# Patient Record
Sex: MALE | Race: WHITE | NOT HISPANIC OR LATINO | Employment: OTHER | URBAN - METROPOLITAN AREA
[De-identification: names, ages, dates, MRNs, and addresses within clinical notes are randomized per-mention and may not be internally consistent; named-entity substitution may affect disease eponyms.]

---

## 2017-01-09 ENCOUNTER — OFFICE VISIT (OUTPATIENT)
Dept: CARDIOLOGY | Facility: CLINIC | Age: 42
End: 2017-01-09

## 2017-01-09 VITALS
SYSTOLIC BLOOD PRESSURE: 110 MMHG | BODY MASS INDEX: 30.11 KG/M2 | DIASTOLIC BLOOD PRESSURE: 82 MMHG | WEIGHT: 227.2 LBS | HEART RATE: 97 BPM | HEIGHT: 73 IN

## 2017-01-09 DIAGNOSIS — I10 ESSENTIAL HYPERTENSION: ICD-10-CM

## 2017-01-09 DIAGNOSIS — I48.0 PAROXYSMAL ATRIAL FIBRILLATION (HCC): Primary | ICD-10-CM

## 2017-01-09 PROCEDURE — 99213 OFFICE O/P EST LOW 20 MIN: CPT | Performed by: INTERNAL MEDICINE

## 2017-01-09 RX ORDER — GABAPENTIN 600 MG/1
600 TABLET ORAL NIGHTLY
COMMUNITY
End: 2017-08-31

## 2017-01-09 RX ORDER — METOPROLOL TARTRATE 50 MG/1
50 TABLET, FILM COATED ORAL 2 TIMES DAILY
Qty: 60 TABLET | Refills: 11 | Status: SHIPPED | OUTPATIENT
Start: 2017-01-09 | End: 2017-07-31 | Stop reason: SDUPTHER

## 2017-01-09 RX ORDER — HYDROCODONE BITARTRATE AND ACETAMINOPHEN 7.5; 325 MG/1; MG/1
1 TABLET ORAL EVERY 6 HOURS PRN
COMMUNITY
End: 2017-08-31

## 2017-01-09 NOTE — PROGRESS NOTES
Subjective:   Mikie Meléndez  1975  [phone]      01/09/2017    [facility] CARDIOLOGY    Reanna Aguilera MD  [address] [place] [id number]    REFERRING DOCTOR: Jamal Ventura MD       Patient ID: Mikie Meléndez is a 41 y.o. male.    Chief Complaint:   Chief Complaint   Patient presents with   • Atrial Fibrillation     Problem List:    1. Paroxysmal Atrial Fibrillation   a. History of previous stress testing and Holter monitoring, data insufficient.   b. Sinus rhythm by EKG.   c. Holter monitor showing sinus rhythm. Rare PVCs. One short run of atrial tachycardia. No AV block or pauses.  d. Echocardiogram with a preliminary report, 12/29/2014. EF 55% to 60%. Left atrium at 3.7 cm.  e. Dec 31 st diagnosis of Afib with RVR. ECV at  on jan 1st with initiation of Xarelto; stopped and changed to Eliquis   f. Echocardiogram 5/19/2016: EF 40-45%. Mild MR. Mild physiologic TR present. ? Tachy induced CMP  g. Cardiac catheterization 6/2/2016: EF 65%. Normal left ventricular function. Geographically normal coronary arteries.  h. Echocardiogram 6/24/2016: EF 50%. No thrombus or mass detected in left atrium or left atrial appendage. Mild left atrial enlargement. Trace intermittent MR.  i. Event Monitor 5/24/2016-6/23/2016 showing runs of PVCs and PACs (half time PAC and other PVCs). No Afib. At times symptoms of palpitations, SOB correlated with ectopy and at other times NSR with no abnormalities.   j. Cardioversion in January and June 2016. Started on Flec 50 mg BID, Toprol and Eliquis  k. S/p PVA 9/28/16, in SR today. Last episode that he has noticed was 6 weeks ago  2. Dyslipidemia.   3. Excessive caffeine intake.   4. Family history of tachycardia.   5. Surgical history:   a. Lithotripsy.   b. Appendectomy.   c. Deviated septum repair.   d. Herniorrhaphy.   e. Back surgery.   f. Recent surgery of Lasik surgery.     No Known Allergies    Current Outpatient Prescriptions:   •   "amLODIPine (NORVASC) 5 MG tablet, Take 1 tablet by mouth Daily., Disp: 30 tablet, Rfl: 2  •  apixaban (ELIQUIS) 5 MG tablet tablet, Take 5 mg by mouth every 12 (twelve) hours., Disp: , Rfl:   •  flecainide (TAMBOCOR) 50 MG tablet, Take 1 tablet by mouth 2 (Two) Times a Day., Disp: 60 tablet, Rfl: 11  •  gabapentin (NEURONTIN) 600 MG tablet, Take 600 mg by mouth Every Night., Disp: , Rfl:   •  HYDROcodone-acetaminophen (NORCO) 7.5-325 MG per tablet, Take 1 tablet by mouth Every 6 (Six) Hours As Needed for moderate pain (4-6)., Disp: , Rfl:   •  lisinopril (PRINIVIL,ZESTRIL) 40 MG tablet, Take 1 tablet by mouth Daily., Disp: 30 tablet, Rfl: 3  •  metoprolol succinate XL (TOPROL-XL) 50 MG 24 hr tablet, Take 1 tablet by mouth daily., Disp: 30 tablet, Rfl: 11    History of Present Illness    Patient presents today for further follow-up and management of his PAF s/p PVA 9/28/16. Patient has been doing fairly well since PVA. He has had 2 known episodes of afib with the last one being 6 weeks ago. Rates have been staying in the 90s consistently.  She states EKG does get some shortness of breath or going up steps however infrequent and feeling good.    No issues with chest pain, shortness of breath, fevers, chills, night sweats, PND, orthopnea or palpitations. No recent ER visits or hospital stays.      The following portions of the patient's history were reviewed and updated as appropriate: allergies, current medications, past family history, past medical history, past social history, past surgical history and problem list.    ROS   14 point ROS negative except as outlined in problem list, HPI and other parts of the note.    Procedures       Objective:       Vitals:    01/09/17 1316   BP: 110/82   BP Location: Left arm   Patient Position: Sitting   Pulse: 97   Weight: 227 lb 3.2 oz (103 kg)   Height: 73\" (185.4 cm)       GENERAL: Well-developed, well-nourished patient in no acute distress.  HEENT: Normocephalic, atraumatic, " PERRLA. Moist mucous membranes.  NECK: No JVD present at 30°. No carotid bruits auscultated.  LUNGS: Clear to auscultation.  CARDIOVASCULAR: Heart has a regular rate and rhythm. No murmurs, gallops or rubs noted.   ABDOMEN: Soft, nontender. Positive bowel sounds.  MUSCULOSKELETAL: No gross deformities. No clubbing, cyanosis, or lower extremity edema.  SKIN: Pink, warm  Neuro: Nonfocal exam. Gait intact  Ext: No edema or bruising    The patient's old records including ambulatory rhythm recordings (ECGs, Holter/event monitor) were reviewed and discussed.      Lab Review:           Diagnosis:   1. Paroxysmal atrial fibrillation  2. Essential hypertension      Assessment & Plan:     1. PAF s/p PVA on 9/28/16. Patient has had 2 episodes of afib but none in the last 6 weeks. Will continue Flecainide today and can consider stopping at next visit if no further issues. Rates in 90s consistently- will increase Toprol to BID. Continue Flecainide, Eliquis, and Toprol today.  We'll check an echocardiogram at the time of the next follow-up.  If no recurrence of any atrial arrhythmias at that time then consideration for stopping antiarrhythmic therapy and anticoagulation at that time.    2. HTN, controlled. Continue current meds will drop the Norvasc down to 2.5 mg daily.     Scribed for Kai Mitchell DO by Elisa Mo PA-C. 1/9/2017  1:46 PM      ROBERT High  01/09/17  1:27 PM     IKai DO, personally performed the services described in this documentation as scribed by the above named individual in my presence, and it is both accurate and complete.  1/9/2017  1:56 PM    Kai Mitchell DO  1:56 PM  01/09/17      EMR Dragon/Transcription disclaimer:  Much of this encounter note is an electronic transcription/translation of spoken language to printed text. Electronic translation of spoken language may permit erroneous, or at times, nonsensical words or phrases to be inadvertently transcribed. Although I have  reviewed the note for such errors, some may still exist.

## 2017-01-27 ENCOUNTER — TELEPHONE (OUTPATIENT)
Dept: CARDIOLOGY | Facility: CLINIC | Age: 42
End: 2017-01-27

## 2017-01-27 DIAGNOSIS — R00.2 PALPITATIONS: ICD-10-CM

## 2017-01-27 DIAGNOSIS — I48.0 PAF (PAROXYSMAL ATRIAL FIBRILLATION) (HCC): Primary | ICD-10-CM

## 2017-01-27 NOTE — TELEPHONE ENCOUNTER
"Mrs. Meléndez called reporting for the past 2 nights Mikie has had an \"irregular heart beat\" that has made it difficult to sleep.  He was not sure if it was AFib, but it was very bothersome.  He saw you on 1/9/17 & metoprolol was increased for HRs in the 90s.  She said there was discussion of a 30day monitor, but he declined at the time. I didn't see a mention of a monitor in your note.  Would you like me to have one mailed to him?  He is agreeable to one at this point.  "

## 2017-01-30 RX ORDER — AMLODIPINE BESYLATE 5 MG/1
TABLET ORAL
Qty: 30 TABLET | Refills: 0 | Status: SHIPPED | OUTPATIENT
Start: 2017-01-30 | End: 2017-04-02 | Stop reason: SDUPTHER

## 2017-01-30 NOTE — TELEPHONE ENCOUNTER
Last seen 10/28/17 and will follow up with Cardiology on 2/27/17. Sent a 30-day refill for amlodipine 5mg daily with instructions to follow up with refills at that appointment.     Keegan JulesD

## 2017-02-11 PROCEDURE — 93270 REMOTE 30 DAY ECG REV/REPORT: CPT | Performed by: INTERNAL MEDICINE

## 2017-02-24 DIAGNOSIS — I10 ESSENTIAL HYPERTENSION: ICD-10-CM

## 2017-02-24 RX ORDER — LISINOPRIL 40 MG/1
TABLET ORAL
Qty: 30 TABLET | Refills: 0 | Status: SHIPPED | OUTPATIENT
Start: 2017-02-24 | End: 2017-02-27 | Stop reason: SDUPTHER

## 2017-02-24 RX ORDER — FLECAINIDE ACETATE 50 MG/1
TABLET ORAL
Qty: 60 TABLET | Refills: 6 | Status: SHIPPED | OUTPATIENT
Start: 2017-02-24 | End: 2017-05-01

## 2017-02-24 NOTE — TELEPHONE ENCOUNTER
Last seen 10/28/17 and will follow up with Cardiology on 2/27/17. Sent a 30-day refill for lisinopril 40mg daily with instructions to follow up with refills at that appointment.      Neeta Fallon, KeeganD

## 2017-02-26 DIAGNOSIS — I10 ESSENTIAL HYPERTENSION: ICD-10-CM

## 2017-02-27 DIAGNOSIS — I10 ESSENTIAL HYPERTENSION: ICD-10-CM

## 2017-02-27 RX ORDER — FLECAINIDE ACETATE 50 MG/1
TABLET ORAL
Qty: 60 TABLET | Refills: 6 | Status: SHIPPED | OUTPATIENT
Start: 2017-02-27 | End: 2017-05-01

## 2017-02-27 RX ORDER — LISINOPRIL 40 MG/1
TABLET ORAL
Qty: 30 TABLET | Refills: 0 | OUTPATIENT
Start: 2017-02-27

## 2017-02-27 RX ORDER — LISINOPRIL 40 MG/1
40 TABLET ORAL DAILY
Qty: 30 TABLET | Refills: 5 | Status: SHIPPED | OUTPATIENT
Start: 2017-02-27 | End: 2017-05-01 | Stop reason: SINTOL

## 2017-02-27 RX ORDER — LISINOPRIL 40 MG/1
TABLET ORAL
Qty: 30 TABLET | Refills: 3 | OUTPATIENT
Start: 2017-02-27

## 2017-02-27 NOTE — TELEPHONE ENCOUNTER
Patient last seen in the clinic on 10/28/16 no follow up appointment. Refills requested for lisinopril a 30 days supply was sent last month and patient was to follow up with cardiologist for further refills. Lisinopril prescription denied and patient instructed to follow up with cardiologist.    Stephani Tom, PharmD  Pharmacy Resident  2/27/2017  1:04 PM

## 2017-03-08 RX ORDER — AMLODIPINE BESYLATE 5 MG/1
TABLET ORAL
Qty: 30 TABLET | Refills: 0 | OUTPATIENT
Start: 2017-03-08

## 2017-03-08 NOTE — TELEPHONE ENCOUNTER
Last seen 10/28/17 and saw Cardiology on 2/27/17. On 1/28 sent a 30-day refill for amlodipine 5mg daily with instructions to follow up with Dr. Mitchell for further refills. Pharmacy instructed to follow up with Dr. Mitchell.     Neeta Fallon, KeeganD

## 2017-03-14 ENCOUNTER — OFFICE VISIT (OUTPATIENT)
Dept: CARDIOLOGY | Facility: CLINIC | Age: 42
End: 2017-03-14

## 2017-03-14 DIAGNOSIS — I10 ESSENTIAL HYPERTENSION: ICD-10-CM

## 2017-03-14 DIAGNOSIS — R00.2 PALPITATIONS: ICD-10-CM

## 2017-03-14 PROCEDURE — 93272 ECG/REVIEW INTERPRET ONLY: CPT | Performed by: INTERNAL MEDICINE

## 2017-03-14 RX ORDER — LISINOPRIL 40 MG/1
TABLET ORAL
Qty: 30 TABLET | Refills: 3 | OUTPATIENT
Start: 2017-03-14

## 2017-03-14 RX ORDER — AMLODIPINE BESYLATE 5 MG/1
TABLET ORAL
Qty: 30 TABLET | Refills: 2 | OUTPATIENT
Start: 2017-03-14

## 2017-03-14 NOTE — TELEPHONE ENCOUNTER
Refills requested for lisinopril and amlodipine.  Lisinopril Rx recently sent by cardiology on 2/27 with 5 refills.  Will need further refills on amlodipine from cardiology as well.    Thanks,  Josiah Marie, PharmD  Pharmacy Resident

## 2017-04-03 RX ORDER — AMLODIPINE BESYLATE 2.5 MG/1
2.5 TABLET ORAL DAILY
Qty: 30 TABLET | Refills: 5 | Status: SHIPPED | OUTPATIENT
Start: 2017-04-03 | End: 2018-05-14 | Stop reason: DRUGHIGH

## 2017-05-01 ENCOUNTER — HOSPITAL ENCOUNTER (OUTPATIENT)
Dept: CARDIOLOGY | Facility: HOSPITAL | Age: 42
Discharge: HOME | End: 2017-05-01
Attending: INTERNAL MEDICINE | Admitting: INTERNAL MEDICINE

## 2017-05-01 ENCOUNTER — OFFICE VISIT (OUTPATIENT)
Dept: CARDIOLOGY | Facility: CLINIC | Age: 42
End: 2017-05-01

## 2017-05-01 VITALS
HEIGHT: 73 IN | WEIGHT: 226 LBS | DIASTOLIC BLOOD PRESSURE: 99 MMHG | HEART RATE: 72 BPM | SYSTOLIC BLOOD PRESSURE: 137 MMHG | BODY MASS INDEX: 29.95 KG/M2

## 2017-05-01 DIAGNOSIS — R00.2 PALPITATIONS: ICD-10-CM

## 2017-05-01 DIAGNOSIS — I48.0 PAF (PAROXYSMAL ATRIAL FIBRILLATION) (HCC): ICD-10-CM

## 2017-05-01 DIAGNOSIS — I48.0 PAROXYSMAL ATRIAL FIBRILLATION (HCC): Primary | ICD-10-CM

## 2017-05-01 DIAGNOSIS — I49.3 PVC'S (PREMATURE VENTRICULAR CONTRACTIONS): ICD-10-CM

## 2017-05-01 DIAGNOSIS — I10 ESSENTIAL HYPERTENSION: ICD-10-CM

## 2017-05-01 LAB
BH CV ECHO MEAS - AO ROOT AREA (BSA CORRECTED): 1
BH CV ECHO MEAS - AO ROOT AREA: 4.2 CM^2
BH CV ECHO MEAS - AO ROOT DIAM: 2.3 CM
BH CV ECHO MEAS - BSA(HAYCOCK): 2.3 M^2
BH CV ECHO MEAS - BSA: 2.3 M^2
BH CV ECHO MEAS - BZI_BMI: 29.9 KILOGRAMS/M^2
BH CV ECHO MEAS - BZI_METRIC_HEIGHT: 185.4 CM
BH CV ECHO MEAS - BZI_METRIC_WEIGHT: 103 KG
BH CV ECHO MEAS - CONTRAST EF (2CH): 44.1 ML/M^2
BH CV ECHO MEAS - CONTRAST EF 4CH: 57.2 ML/M^2
BH CV ECHO MEAS - EDV(CUBED): 129.6 ML
BH CV ECHO MEAS - EDV(MOD-SP2): 143 ML
BH CV ECHO MEAS - EDV(MOD-SP4): 138 ML
BH CV ECHO MEAS - EDV(TEICH): 121.6 ML
BH CV ECHO MEAS - EF(CUBED): 65.8 %
BH CV ECHO MEAS - EF(MOD-SP2): 44.1 %
BH CV ECHO MEAS - EF(MOD-SP4): 57.2 %
BH CV ECHO MEAS - EF(TEICH): 57 %
BH CV ECHO MEAS - ESV(CUBED): 44.4 ML
BH CV ECHO MEAS - ESV(MOD-SP2): 80 ML
BH CV ECHO MEAS - ESV(MOD-SP4): 59 ML
BH CV ECHO MEAS - ESV(TEICH): 52.3 ML
BH CV ECHO MEAS - FS: 30 %
BH CV ECHO MEAS - IVS/LVPW: 0.98
BH CV ECHO MEAS - IVSD: 1.2 CM
BH CV ECHO MEAS - LA DIMENSION: 3.7 CM
BH CV ECHO MEAS - LA/AO: 1.6
BH CV ECHO MEAS - LAT PEAK E' VEL: 7.1 CM/SEC
BH CV ECHO MEAS - LV DIASTOLIC VOL/BSA (35-75): 60.8 ML/M^2
BH CV ECHO MEAS - LV MASS(C)D: 241 GRAMS
BH CV ECHO MEAS - LV MASS(C)DI: 106.1 GRAMS/M^2
BH CV ECHO MEAS - LV SYSTOLIC VOL/BSA (12-30): 26 ML/M^2
BH CV ECHO MEAS - LVIDD: 5.1 CM
BH CV ECHO MEAS - LVIDS: 3.5 CM
BH CV ECHO MEAS - LVLD AP2: 9 CM
BH CV ECHO MEAS - LVLD AP4: 7.6 CM
BH CV ECHO MEAS - LVLS AP2: 7.6 CM
BH CV ECHO MEAS - LVLS AP4: 6.1 CM
BH CV ECHO MEAS - LVOT AREA (M): 3.8 CM^2
BH CV ECHO MEAS - LVOT AREA: 3.8 CM^2
BH CV ECHO MEAS - LVOT DIAM: 2.2 CM
BH CV ECHO MEAS - LVPWD: 1.2 CM
BH CV ECHO MEAS - MED PEAK E' VEL: 11.6 CM/SEC
BH CV ECHO MEAS - MV A MAX VEL: 79.5 CM/SEC
BH CV ECHO MEAS - MV DEC SLOPE: 439.5 CM/SEC^2
BH CV ECHO MEAS - MV DEC TIME: 0.18 SEC
BH CV ECHO MEAS - MV E MAX VEL: 82.9 CM/SEC
BH CV ECHO MEAS - MV E/A: 1
BH CV ECHO MEAS - MV P1/2T MAX VEL: 91.2 CM/SEC
BH CV ECHO MEAS - MV P1/2T: 60.8 MSEC
BH CV ECHO MEAS - MVA P1/2T LCG: 2.4 CM^2
BH CV ECHO MEAS - MVA(P1/2T): 3.6 CM^2
BH CV ECHO MEAS - PA ACC SLOPE: 495 CM/SEC^2
BH CV ECHO MEAS - PA ACC TIME: 0.12 SEC
BH CV ECHO MEAS - PA PR(ACCEL): 25 MMHG
BH CV ECHO MEAS - RV MAX PG: 0.64 MMHG
BH CV ECHO MEAS - RV V1 MAX: 40 CM/SEC
BH CV ECHO MEAS - SI(CUBED): 37.5 ML/M^2
BH CV ECHO MEAS - SI(MOD-SP2): 27.7 ML/M^2
BH CV ECHO MEAS - SI(MOD-SP4): 34.8 ML/M^2
BH CV ECHO MEAS - SI(TEICH): 30.5 ML/M^2
BH CV ECHO MEAS - SV(CUBED): 85.2 ML
BH CV ECHO MEAS - SV(MOD-SP2): 63 ML
BH CV ECHO MEAS - SV(MOD-SP4): 79 ML
BH CV ECHO MEAS - SV(TEICH): 69.3 ML
BH CV ECHO MEAS - TAPSE (>1.6): 2.3 CM2
BH CV XLRA - RV BASE: 3.4 CM
BH CV XLRA - RV LENGTH: 6.5 CM
BH CV XLRA - RV MID: 3.2 CM
BH CV XLRA - TDI S': 12.3 CM/SEC
E/E' RATIO: 4.4
LV EF 2D ECHO EST: 60 %

## 2017-05-01 PROCEDURE — 25010000002 SULFUR HEXAFLUORIDE MICROSPH 60.7-25 MG RECONSTITUTED SUSPENSION

## 2017-05-01 PROCEDURE — 25010000002 SULFUR HEXAFLUORIDE MICROSPH 60.7-25 MG RECONSTITUTED SUSPENSION: Performed by: INTERNAL MEDICINE

## 2017-05-01 PROCEDURE — 93306 TTE W/DOPPLER COMPLETE: CPT | Performed by: INTERNAL MEDICINE

## 2017-05-01 PROCEDURE — 99213 OFFICE O/P EST LOW 20 MIN: CPT | Performed by: PHYSICIAN ASSISTANT

## 2017-05-01 PROCEDURE — C8929 TTE W OR WO FOL WCON,DOPPLER: HCPCS

## 2017-05-01 RX ORDER — LOSARTAN POTASSIUM 50 MG/1
50 TABLET ORAL DAILY
Qty: 30 TABLET | Refills: 5 | Status: SHIPPED | OUTPATIENT
Start: 2017-05-01 | End: 2017-10-19 | Stop reason: SDUPTHER

## 2017-05-01 RX ADMIN — SULFUR HEXAFLUORIDE 3 ML: KIT at 10:44

## 2017-07-31 RX ORDER — METOPROLOL TARTRATE 50 MG/1
50 TABLET, FILM COATED ORAL 2 TIMES DAILY
Qty: 60 TABLET | Refills: 6 | Status: SHIPPED | OUTPATIENT
Start: 2017-07-31 | End: 2018-05-25 | Stop reason: SDUPTHER

## 2017-08-31 ENCOUNTER — OFFICE VISIT (OUTPATIENT)
Dept: CARDIOLOGY | Facility: CLINIC | Age: 42
End: 2017-08-31

## 2017-08-31 VITALS
DIASTOLIC BLOOD PRESSURE: 84 MMHG | HEART RATE: 86 BPM | SYSTOLIC BLOOD PRESSURE: 130 MMHG | WEIGHT: 224.6 LBS | BODY MASS INDEX: 29.77 KG/M2 | HEIGHT: 73 IN

## 2017-08-31 DIAGNOSIS — I48.0 PAROXYSMAL ATRIAL FIBRILLATION (HCC): Primary | ICD-10-CM

## 2017-08-31 DIAGNOSIS — I49.3 PVC'S (PREMATURE VENTRICULAR CONTRACTIONS): ICD-10-CM

## 2017-08-31 DIAGNOSIS — I42.9 CARDIOMYOPATHY (HCC): ICD-10-CM

## 2017-08-31 PROCEDURE — 93000 ELECTROCARDIOGRAM COMPLETE: CPT | Performed by: INTERNAL MEDICINE

## 2017-08-31 PROCEDURE — 99213 OFFICE O/P EST LOW 20 MIN: CPT | Performed by: INTERNAL MEDICINE

## 2017-08-31 RX ORDER — CIPROFLOXACIN 500 MG/1
TABLET, FILM COATED ORAL EVERY 12 HOURS
Refills: 0 | COMMUNITY
Start: 2017-08-28 | End: 2018-05-14

## 2017-08-31 RX ORDER — TAMSULOSIN HYDROCHLORIDE 0.4 MG/1
CAPSULE ORAL DAILY
Refills: 3 | COMMUNITY
Start: 2017-08-28 | End: 2018-11-12 | Stop reason: ALTCHOICE

## 2017-08-31 RX ORDER — METHYLPREDNISOLONE 4 MG/1
TABLET ORAL
Refills: 0 | COMMUNITY
Start: 2017-08-29 | End: 2018-05-14

## 2017-09-05 ENCOUNTER — TELEPHONE (OUTPATIENT)
Dept: CARDIOLOGY | Facility: CLINIC | Age: 42
End: 2017-09-05

## 2017-09-05 DIAGNOSIS — I48.0 PAROXYSMAL ATRIAL FIBRILLATION (HCC): ICD-10-CM

## 2017-09-05 DIAGNOSIS — I48.19 PERSISTENT ATRIAL FIBRILLATION (HCC): Primary | ICD-10-CM

## 2017-09-05 NOTE — TELEPHONE ENCOUNTER
Wife calling to report that Mikie feels horrible off Flecainide and has been very weak and tires with dizziness since stopping the flecainide. Instructed wife to take  to [facility] for EKG to confirm AFIB episode. Order faxed to [phone]    EKG on your desk.

## 2017-09-06 ENCOUNTER — HOSPITAL ENCOUNTER (EMERGENCY)
Facility: HOSPITAL | Age: 42
Discharge: HOME | End: 2017-09-06
Attending: EMERGENCY MEDICINE | Admitting: EMERGENCY MEDICINE

## 2017-09-06 ENCOUNTER — APPOINTMENT (OUTPATIENT)
Dept: CT IMAGING | Facility: HOSPITAL | Age: 42
End: 2017-09-06

## 2017-09-06 ENCOUNTER — APPOINTMENT (OUTPATIENT)
Dept: ULTRASOUND IMAGING | Facility: HOSPITAL | Age: 42
End: 2017-09-06

## 2017-09-06 VITALS
OXYGEN SATURATION: 99 % | DIASTOLIC BLOOD PRESSURE: 106 MMHG | HEART RATE: 74 BPM | TEMPERATURE: 98.4 F | RESPIRATION RATE: 18 BRPM | HEIGHT: 73 IN | SYSTOLIC BLOOD PRESSURE: 166 MMHG | WEIGHT: 225 LBS | BODY MASS INDEX: 29.82 KG/M2

## 2017-09-06 DIAGNOSIS — K76.0 FATTY LIVER: ICD-10-CM

## 2017-09-06 DIAGNOSIS — R17 ELEVATED BILIRUBIN: ICD-10-CM

## 2017-09-06 DIAGNOSIS — R11.0 NAUSEA: ICD-10-CM

## 2017-09-06 DIAGNOSIS — I10 ESSENTIAL HYPERTENSION: ICD-10-CM

## 2017-09-06 DIAGNOSIS — N41.9 PROSTATITIS, UNSPECIFIED PROSTATITIS TYPE: ICD-10-CM

## 2017-09-06 DIAGNOSIS — R10.9 ABDOMINAL PAIN, UNSPECIFIED LOCATION: Primary | ICD-10-CM

## 2017-09-06 LAB
ALBUMIN SERPL-MCNC: 4.2 G/DL (ref 3.2–4.8)
ALBUMIN/GLOB SERPL: 1.4 G/DL (ref 1.5–2.5)
ALP SERPL-CCNC: 67 U/L (ref 25–100)
ALT SERPL W P-5'-P-CCNC: 35 U/L (ref 7–40)
ANION GAP SERPL CALCULATED.3IONS-SCNC: 6 MMOL/L (ref 3–11)
AST SERPL-CCNC: 19 U/L (ref 0–33)
BASOPHILS # BLD AUTO: 0.03 10*3/MM3 (ref 0–0.2)
BASOPHILS NFR BLD AUTO: 0.4 % (ref 0–1)
BILIRUB SERPL-MCNC: 1.3 MG/DL (ref 0.3–1.2)
BILIRUB UR QL STRIP: NEGATIVE
BUN BLD-MCNC: 14 MG/DL (ref 9–23)
BUN/CREAT SERPL: 15.6 (ref 7–25)
CALCIUM SPEC-SCNC: 9.5 MG/DL (ref 8.7–10.4)
CHLORIDE SERPL-SCNC: 103 MMOL/L (ref 99–109)
CLARITY UR: CLEAR
CO2 SERPL-SCNC: 29 MMOL/L (ref 20–31)
COLOR UR: YELLOW
CREAT BLD-MCNC: 0.9 MG/DL (ref 0.6–1.3)
CRP SERPL-MCNC: 0.13 MG/DL (ref 0–1)
DEPRECATED RDW RBC AUTO: 41.4 FL (ref 37–54)
EOSINOPHIL # BLD AUTO: 0.33 10*3/MM3 (ref 0–0.3)
EOSINOPHIL NFR BLD AUTO: 4 % (ref 0–3)
ERYTHROCYTE [DISTWIDTH] IN BLOOD BY AUTOMATED COUNT: 12.3 % (ref 11.3–14.5)
ERYTHROCYTE [SEDIMENTATION RATE] IN BLOOD: 5 MM/HR (ref 0–15)
GFR SERPL CREATININE-BSD FRML MDRD: 92 ML/MIN/1.73
GLOBULIN UR ELPH-MCNC: 3 GM/DL
GLUCOSE BLD-MCNC: 106 MG/DL (ref 70–100)
GLUCOSE UR STRIP-MCNC: NEGATIVE MG/DL
HCT VFR BLD AUTO: 45.7 % (ref 38.9–50.9)
HGB BLD-MCNC: 16.1 G/DL (ref 13.1–17.5)
HGB UR QL STRIP.AUTO: NEGATIVE
HOLD SPECIMEN: NORMAL
HOLD SPECIMEN: NORMAL
IMM GRANULOCYTES # BLD: 0 10*3/MM3 (ref 0–0.03)
IMM GRANULOCYTES NFR BLD: 0 % (ref 0–0.6)
KETONES UR QL STRIP: NEGATIVE
LEUKOCYTE ESTERASE UR QL STRIP.AUTO: NEGATIVE
LIPASE SERPL-CCNC: 28 U/L (ref 6–51)
LYMPHOCYTES # BLD AUTO: 2.68 10*3/MM3 (ref 0.6–4.8)
LYMPHOCYTES NFR BLD AUTO: 32.6 % (ref 24–44)
MCH RBC QN AUTO: 32.1 PG (ref 27–31)
MCHC RBC AUTO-ENTMCNC: 35.2 G/DL (ref 32–36)
MCV RBC AUTO: 91 FL (ref 80–99)
MONOCYTES # BLD AUTO: 0.54 10*3/MM3 (ref 0–1)
MONOCYTES NFR BLD AUTO: 6.6 % (ref 0–12)
NEUTROPHILS # BLD AUTO: 4.65 10*3/MM3 (ref 1.5–8.3)
NEUTROPHILS NFR BLD AUTO: 56.4 % (ref 41–71)
NITRITE UR QL STRIP: NEGATIVE
PH UR STRIP.AUTO: <=5 [PH] (ref 5–8)
PLATELET # BLD AUTO: 218 10*3/MM3 (ref 150–450)
PMV BLD AUTO: 11.2 FL (ref 6–12)
POTASSIUM BLD-SCNC: 3.9 MMOL/L (ref 3.5–5.5)
PROT SERPL-MCNC: 7.2 G/DL (ref 5.7–8.2)
PROT UR QL STRIP: NEGATIVE
RBC # BLD AUTO: 5.02 10*6/MM3 (ref 4.2–5.76)
SODIUM BLD-SCNC: 138 MMOL/L (ref 132–146)
SP GR UR STRIP: >=1.03 (ref 1–1.03)
UROBILINOGEN UR QL STRIP: NORMAL
WBC NRBC COR # BLD: 8.23 10*3/MM3 (ref 3.5–10.8)
WHOLE BLOOD HOLD SPECIMEN: NORMAL
WHOLE BLOOD HOLD SPECIMEN: NORMAL

## 2017-09-06 PROCEDURE — 76705 ECHO EXAM OF ABDOMEN: CPT

## 2017-09-06 PROCEDURE — 99284 EMERGENCY DEPT VISIT MOD MDM: CPT

## 2017-09-06 PROCEDURE — 74177 CT ABD & PELVIS W/CONTRAST: CPT

## 2017-09-06 PROCEDURE — 80053 COMPREHEN METABOLIC PANEL: CPT | Performed by: EMERGENCY MEDICINE

## 2017-09-06 PROCEDURE — 85025 COMPLETE CBC W/AUTO DIFF WBC: CPT | Performed by: EMERGENCY MEDICINE

## 2017-09-06 PROCEDURE — 81003 URINALYSIS AUTO W/O SCOPE: CPT | Performed by: EMERGENCY MEDICINE

## 2017-09-06 PROCEDURE — 36415 COLL VENOUS BLD VENIPUNCTURE: CPT

## 2017-09-06 PROCEDURE — 0 DIATRIZOATE MEGLUMINE & SODIUM PER 1 ML: Performed by: EMERGENCY MEDICINE

## 2017-09-06 PROCEDURE — 0 IOPAMIDOL 61 % SOLUTION: Performed by: EMERGENCY MEDICINE

## 2017-09-06 PROCEDURE — 86140 C-REACTIVE PROTEIN: CPT | Performed by: EMERGENCY MEDICINE

## 2017-09-06 PROCEDURE — 85652 RBC SED RATE AUTOMATED: CPT | Performed by: EMERGENCY MEDICINE

## 2017-09-06 PROCEDURE — 83690 ASSAY OF LIPASE: CPT | Performed by: EMERGENCY MEDICINE

## 2017-09-06 RX ORDER — SODIUM CHLORIDE 0.9 % (FLUSH) 0.9 %
10 SYRINGE (ML) INJECTION AS NEEDED
Status: DISCONTINUED | OUTPATIENT
Start: 2017-09-06 | End: 2017-09-06 | Stop reason: HOSPADM

## 2017-09-06 RX ADMIN — IOPAMIDOL 95 ML: 612 INJECTION, SOLUTION INTRAVENOUS at 18:28

## 2017-09-06 RX ADMIN — Medication 15 ML: at 17:19

## 2017-09-06 RX ADMIN — METOPROLOL TARTRATE 25 MG: 25 TABLET ORAL at 20:13

## 2017-09-06 NOTE — ED PROVIDER NOTES
Subjective   HPI Comments: Mikie Meléndez is a 42 y.o.male who presents to the ED with c/o abdominal pain which began 3 weeks ago. He reports experiencing worsening right lower quadrant abdominal pain with associated nausea, intermittent dysuria, and groin discomfort. He notes his symptoms worsen with palpation and position changes. He rates his current pain a 3/10. He was seen by Dr. Ibarra, his PCP and diagnosed with prostatitis. He was started on Cipro with some improvement in his symptoms. He also c/o fatigue but denies fatty or spicy food intolerance, urgency, frequency, diarrhea, bloody stools, vomiting, cough, congestion, sore throat, runny nose, fevers, chills, or any other complaints at this time. He notes he was taken off flecainide 5 days ago.       Patient is a 42 y.o. male presenting with abdominal pain.   History provided by:  Patient  Abdominal Pain   Pain location:  RLQ  Pain radiates to:  Groin  Pain severity:  Moderate  Onset quality:  Sudden  Timing:  Constant  Progression:  Worsening  Chronicity:  New  Context comment:  Recently diagnosed with prostatitis   Relieved by:  Nothing  Worsened by:  Position changes and palpation  Ineffective treatments: Medication   Associated symptoms: dysuria, fatigue and nausea    Associated symptoms: no chest pain, no chills, no cough, no diarrhea, no fever, no sore throat and no vomiting        Review of Systems   Constitutional: Positive for fatigue. Negative for chills and fever.   HENT: Negative for congestion, rhinorrhea and sore throat.    Respiratory: Negative for cough.    Cardiovascular: Negative for chest pain.   Gastrointestinal: Positive for abdominal pain and nausea. Negative for blood in stool, diarrhea and vomiting.   Genitourinary: Positive for dysuria. Negative for frequency and urgency.   All other systems reviewed and are negative.      Past Medical History:   Diagnosis Date   • Excessive caffeine intake    • Hypertension    • Kidney calculi   "  • PAC (premature atrial contraction)    • PAF (paroxysmal atrial fibrillation)     CHADS-VASc = 1   • PVC (premature ventricular contraction)        No Known Allergies    Past Surgical History:   Procedure Laterality Date   • APPENDECTOMY     • BACK SURGERY     • CARDIAC CATHETERIZATION  06/2016    \"NORMAL\" PER DR. KIMANI PHIPPS   • CARDIAC ELECTROPHYSIOLOGY PROCEDURE N/A 9/28/2016    Procedure: PVA. Stop Flecainide 5 days prior to the procedure. Stop Metoprolol 3 days prior to the procedure.;  Surgeon: Kai Mitchell DO;  Location: [facility] INVASIVE LOCATION;  Service:    • CARDIOVERSION  06/24/2016   • EXTRACORPOREAL SHOCK WAVE LITHOTRIPSY (ESWL)     • HERNIA REPAIR     • LASIK     • NASAL SEPTUM SURGERY         Family History   Problem Relation Age of Onset   • Other Other      tachycardia   • No Known Problems Mother    • No Known Problems Father    • Arrhythmia Sister        Social History     Social History   • Marital status:      Spouse name: N/A   • Number of children: N/A   • Years of education: N/A     Occupational History   • Employed      Social History Main Topics   • Smoking status: Never Smoker   • Smokeless tobacco: Never Used   • Alcohol use No   • Drug use: No   • Sexual activity: Yes     Partners: Female     Birth control/ protection: None     Other Topics Concern   • None     Social History Narrative    Patient drinks 1-2 servings of caffeine per day.         Objective   Physical Exam   Constitutional: He is oriented to person, place, and time. He appears well-developed and well-nourished. No distress.   HENT:   Head: Normocephalic and atraumatic.   Nose: Nose normal.   Mouth/Throat: Oropharynx is clear and moist.   Eyes: Conjunctivae are normal. No scleral icterus.   Neck: Normal range of motion. Neck supple.   Cardiovascular: Normal rate, regular rhythm and normal heart sounds.    No murmur heard.  Pulmonary/Chest: Effort normal and breath sounds normal. No respiratory distress. "   Abdominal: Soft. Bowel sounds are normal. He exhibits no mass. There is tenderness (mild tenderness in the right and left lower quadrant regions ). There is no rebound and no guarding.   No hepatosplenectomy . Negative Singer's sign even with vigorous palpation.    Genitourinary:   Genitourinary Comments: Mild fullness at the right inguinal ring with cough. No significant bulge present. No femoral bulge.    Musculoskeletal: Normal range of motion. He exhibits no edema.   Neurological: He is alert and oriented to person, place, and time.   Skin: Skin is warm and dry.   Psychiatric: He has a normal mood and affect. His behavior is normal.   Nursing note and vitals reviewed.      Procedures         ED Course  ED Course   Comment By Time   I discussed the findings at length with the patient interviewed the findings with the patient and spouse on examination.  He has no clear etiology for his symptoms on examination.  He reports that his prosthetic symptoms are improved on antibiotics.  His urinalysis is unremarkable today.  He does have some lower abdominal tenderness to palpation though without peritoneal findings.  He has a follow-up appointment with his doctor in 2 days, and his had symptoms for the last 3 days.I discussed further evaluation in the emergency department I'll add inflammatory markers.  I discussed risks and benefits of CT as his wife would very much like a CT and the patient agrees.  I did discuss specifically radiation risks.  He's tolerated IV contrast in the past without difficulty.  Both patient and spouse are very much in favor of CT scanning, and will obtain a CT of the abdomen and pelvis. Sai Yoder MD 09/06 9854     Recent Results (from the past 24 hour(s))   Comprehensive Metabolic Panel    Collection Time: 09/06/17 12:53 PM   Result Value Ref Range    Glucose 106 (H) 70 - 100 mg/dL    BUN 14 9 - 23 mg/dL    Creatinine 0.90 0.60 - 1.30 mg/dL    Sodium 138 132 - 146 mmol/L    Potassium  3.9 3.5 - 5.5 mmol/L    Chloride 103 99 - 109 mmol/L    CO2 29.0 20.0 - 31.0 mmol/L    Calcium 9.5 8.7 - 10.4 mg/dL    Total Protein 7.2 5.7 - 8.2 g/dL    Albumin 4.20 3.20 - 4.80 g/dL    ALT (SGPT) 35 7 - 40 U/L    AST (SGOT) 19 0 - 33 U/L    Alkaline Phosphatase 67 25 - 100 U/L    Total Bilirubin 1.3 (H) 0.3 - 1.2 mg/dL    eGFR Non African Amer 92 >60 mL/min/1.73    Globulin 3.0 gm/dL    A/G Ratio 1.4 (L) 1.5 - 2.5 g/dL    BUN/Creatinine Ratio 15.6 7.0 - 25.0    Anion Gap 6.0 3.0 - 11.0 mmol/L   Lipase    Collection Time: 09/06/17 12:53 PM   Result Value Ref Range    Lipase 28 6 - 51 U/L   Light Blue Top    Collection Time: 09/06/17 12:53 PM   Result Value Ref Range    Extra Tube hold for add-on    Green Top (Gel)    Collection Time: 09/06/17 12:53 PM   Result Value Ref Range    Extra Tube Hold for add-ons.    Lavender Top    Collection Time: 09/06/17 12:53 PM   Result Value Ref Range    Extra Tube hold for add-on    Gold Top - SST    Collection Time: 09/06/17 12:53 PM   Result Value Ref Range    Extra Tube Hold for add-ons.    CBC Auto Differential    Collection Time: 09/06/17 12:53 PM   Result Value Ref Range    WBC 8.23 3.50 - 10.80 10*3/mm3    RBC 5.02 4.20 - 5.76 10*6/mm3    Hemoglobin 16.1 13.1 - 17.5 g/dL    Hematocrit 45.7 38.9 - 50.9 %    MCV 91.0 80.0 - 99.0 fL    MCH 32.1 (H) 27.0 - 31.0 pg    MCHC 35.2 32.0 - 36.0 g/dL    RDW 12.3 11.3 - 14.5 %    RDW-SD 41.4 37.0 - 54.0 fl    MPV 11.2 6.0 - 12.0 fL    Platelets 218 150 - 450 10*3/mm3    Neutrophil % 56.4 41.0 - 71.0 %    Lymphocyte % 32.6 24.0 - 44.0 %    Monocyte % 6.6 0.0 - 12.0 %    Eosinophil % 4.0 (H) 0.0 - 3.0 %    Basophil % 0.4 0.0 - 1.0 %    Immature Grans % 0.0 0.0 - 0.6 %    Neutrophils, Absolute 4.65 1.50 - 8.30 10*3/mm3    Lymphocytes, Absolute 2.68 0.60 - 4.80 10*3/mm3    Monocytes, Absolute 0.54 0.00 - 1.00 10*3/mm3    Eosinophils, Absolute 0.33 (H) 0.00 - 0.30 10*3/mm3    Basophils, Absolute 0.03 0.00 - 0.20 10*3/mm3    Immature Grans,  Absolute 0.00 0.00 - 0.03 10*3/mm3   C-reactive Protein    Collection Time: 09/06/17 12:53 PM   Result Value Ref Range    C-Reactive Protein 0.13 0.00 - 1.00 mg/dL   Sedimentation Rate    Collection Time: 09/06/17 12:53 PM   Result Value Ref Range    Sed Rate 5 0 - 15 mm/hr   Urinalysis With / Culture If Indicated    Collection Time: 09/06/17 12:54 PM   Result Value Ref Range    Color, UA Yellow Yellow, Straw    Appearance, UA Clear Clear    pH, UA <=5.0 5.0 - 8.0    Specific Gravity, UA >=1.030 1.001 - 1.030    Glucose, UA Negative Negative    Ketones, UA Negative Negative    Bilirubin, UA Negative Negative    Blood, UA Negative Negative    Protein, UA Negative Negative    Leuk Esterase, UA Negative Negative    Nitrite, UA Negative Negative    Urobilinogen, UA 0.2 E.U./dL 0.2 - 1.0 E.U./dL     Note: In addition to lab results from this visit, the labs listed above may include labs taken at another facility or during a different encounter within the last 24 hours. Please correlate lab times with ED admission and discharge times for further clarification of the services performed during this visit.    CT Abdomen Pelvis With Contrast   Preliminary Result   No CT evidence of acute intra-abdominal or pelvic   abnormality.       DICTATED:     09/06/2017   EDITED:         09/06/2017          US Gallbladder    (Results Pending)     Vitals:    09/06/17 1914 09/06/17 1930 09/06/17 1941 09/06/17 1950   BP: (!) 151/112 (!) 152/112 (!) 162/106 (!) 160/108   Patient Position:    Sitting   Pulse: 74  78 78   Resp:       Temp:       TempSrc:       SpO2: 98% 99% 97%    Weight:       Height:         Medications   sodium chloride 0.9 % flush 10 mL (not administered)   metoprolol tartrate (LOPRESSOR) tablet 25 mg (not administered)   diatrizoate meglumine-sodium (GASTROGRAFIN) 66-10 % solution 15 mL (15 mL Oral Given 9/6/17 9689)   iopamidol (ISOVUE-300) 61 % injection 100 mL (95 mL Intravenous Given 9/6/17 9928)     ECG/EMG Results  (last 24 hours)     ** No results found for the last 24 hours. **                        MDM    Final diagnoses:   Abdominal pain, unspecified location   Nausea   Prostatitis, unspecified prostatitis type   Fatty liver   Elevated bilirubin   Essential hypertension       Documentation assistance provided by ana maria Mejia.  Information recorded by the scribe was done at my direction and has been verified and validated by me.     Julia Mejia  09/06/17 1547       Julia Mejia  09/06/17 1941       Julia Mejia  09/06/17 1947       Sai Yoder MD  09/06/17 2005

## 2017-09-07 ENCOUNTER — TELEPHONE (OUTPATIENT)
Dept: CARDIOLOGY | Facility: CLINIC | Age: 42
End: 2017-09-07

## 2017-09-07 NOTE — TELEPHONE ENCOUNTER
Wife Rosalee called and she took him to ER for abd pain and his b/p was 162/108 and 164/118. They increased his metoprolol back to 50mg 1 BID. bp this am is 138/102. Wife wanted to know if he needed to do anything different for b/p issues. I did let her know that if he is in pain that will increase his b/p. Please advise.

## 2017-09-08 DIAGNOSIS — I48.20 CHRONIC A-FIB (HCC): Primary | ICD-10-CM

## 2017-09-08 NOTE — TELEPHONE ENCOUNTER
Called pt on mobile and left message to increase Norvasc (amlodipine to 5mg 1 daily) and call the office on Monday to let us know if this helped.

## 2017-09-11 DIAGNOSIS — I48.0 PAF (PAROXYSMAL ATRIAL FIBRILLATION) (HCC): ICD-10-CM

## 2017-09-11 RX ORDER — FLECAINIDE ACETATE 50 MG/1
TABLET ORAL
Qty: 60 TABLET | Refills: 0 | OUTPATIENT
Start: 2017-09-11

## 2017-09-25 RX ORDER — AMLODIPINE BESYLATE 2.5 MG/1
TABLET ORAL
Qty: 30 TABLET | Refills: 0 | OUTPATIENT
Start: 2017-09-25

## 2017-10-19 RX ORDER — LOSARTAN POTASSIUM 50 MG/1
TABLET ORAL
Qty: 30 TABLET | Refills: 6 | Status: SHIPPED | OUTPATIENT
Start: 2017-10-19 | End: 2019-01-26 | Stop reason: SDUPTHER

## 2018-03-05 ENCOUNTER — TELEPHONE (OUTPATIENT)
Dept: CARDIOLOGY | Facility: CLINIC | Age: 43
End: 2018-03-05

## 2018-03-05 DIAGNOSIS — I48.20 CHRONIC A-FIB (HCC): Primary | ICD-10-CM

## 2018-03-05 NOTE — TELEPHONE ENCOUNTER
I tried to call patient's wife back. No answer. I left a message. Order placed for event monitor.  
Patient has an appointment with you on 3/12/2018.He has been going in and out of A-fib for a month and a half. He wanted to know if you would want him to wear a monitor before he sees you and reschedule the appointment. He is also currently NOT taking any anticoagulation.  
Yes 30 day event monitor    Paula  
(2) assistive person

## 2018-04-30 RX ORDER — AMLODIPINE BESYLATE 5 MG/1
TABLET ORAL
Qty: 30 TABLET | Refills: 4 | Status: SHIPPED | OUTPATIENT
Start: 2018-04-30 | End: 2018-09-29 | Stop reason: SDUPTHER

## 2018-05-14 ENCOUNTER — OFFICE VISIT (OUTPATIENT)
Dept: CARDIOLOGY | Facility: CLINIC | Age: 43
End: 2018-05-14

## 2018-05-14 VITALS
DIASTOLIC BLOOD PRESSURE: 80 MMHG | WEIGHT: 223.2 LBS | HEIGHT: 73 IN | BODY MASS INDEX: 29.58 KG/M2 | HEART RATE: 81 BPM | SYSTOLIC BLOOD PRESSURE: 118 MMHG

## 2018-05-14 DIAGNOSIS — R00.2 PALPITATIONS: ICD-10-CM

## 2018-05-14 DIAGNOSIS — I49.3 PVC'S (PREMATURE VENTRICULAR CONTRACTIONS): Primary | ICD-10-CM

## 2018-05-14 DIAGNOSIS — I47.1 PSVT (PAROXYSMAL SUPRAVENTRICULAR TACHYCARDIA) (HCC): ICD-10-CM

## 2018-05-14 DIAGNOSIS — I48.0 PAROXYSMAL ATRIAL FIBRILLATION (HCC): Primary | ICD-10-CM

## 2018-05-14 DIAGNOSIS — I48.0 PAROXYSMAL ATRIAL FIBRILLATION (HCC): ICD-10-CM

## 2018-05-14 PROBLEM — I47.10 PSVT (PAROXYSMAL SUPRAVENTRICULAR TACHYCARDIA): Status: ACTIVE | Noted: 2018-05-14

## 2018-05-14 PROCEDURE — 99214 OFFICE O/P EST MOD 30 MIN: CPT | Performed by: INTERNAL MEDICINE

## 2018-05-14 RX ORDER — ASPIRIN 81 MG/1
81 TABLET ORAL DAILY
COMMUNITY

## 2018-05-14 RX ORDER — FLECAINIDE ACETATE 50 MG/1
50 TABLET ORAL 2 TIMES DAILY
Qty: 60 TABLET | Refills: 6 | Status: SHIPPED | OUTPATIENT
Start: 2018-05-14 | End: 2018-11-27 | Stop reason: SDUPTHER

## 2018-05-14 NOTE — PROGRESS NOTES
Subjective:   Mikie Meléndez  1975  [phone]      05/14/2018    [facility] CARDIOLOGY    Heather Ibarra MD  [address] [place] [id number]        Patient ID: Mikie Meléndez is a 43 y.o. male.    Chief Complaint:   Chief Complaint   Patient presents with   • Atrial Fibrillation     Problem List:  1. Paroxysmal Atrial Fibrillation   a. History of previous stress testing and Holter monitoring, data insufficient.   b. Sinus rhythm by EKG.   c. Holter monitor showing sinus rhythm. Rare PVCs. One short run of atrial tachycardia. No AV block or pauses.  d. Echocardiogram with a preliminary report, 12/29/2014. EF 55% to 60%. Left atrium at 3.7 cm.  e. Dec 31 st diagnosis of Afib with RVR. ECV at  on jan 1st with initiation of Xarelto; stopped and changed to Eliquis   f. Echocardiogram 5/19/2016: EF 40-45%. Mild MR. Mild physiologic TR present. ? Tachy induced CMP  g. Cardiac catheterization 6/2/2016: EF 65%. Normal left ventricular function. Geographically normal coronary arteries.  h. Echocardiogram 6/24/2016: EF 50%. No thrombus or mass detected in left atrium or left atrial appendage. Mild left atrial enlargement. Trace intermittent MR.  i. Event Monitor 5/24/2016-6/23/2016 showing runs of PVCs and PACs (half time PAC and other PVCs). No Afib. At times symptoms of palpitations, SOB correlated with ectopy and at other times NSR with no abnormalities.   j. Cardioversion in January and June 2016. Started on Flec 50 mg BID, Toprol and Eliquis  k. S/p PVA 9/28/16, in SR today. Last episode that he has noticed was 6 weeks ago  2. Dyslipidemia.   3. Excessive caffeine intake.   4. Family history of tachycardia.   5. Surgical history:   a. Lithotripsy.   b. Appendectomy.   c. Deviated septum repair.   d. Herniorrhaphy.   e. Back surgery.   f. Recent surgery of Lasik surgery    No Known Allergies    Current Outpatient Prescriptions:   •  amLODIPine (NORVASC) 5 MG tablet, TAKE ONE  "TABLET BY MOUTH DAILY, Disp: 30 tablet, Rfl: 4  •  apixaban (ELIQUIS) 5 MG tablet tablet, Take 5 mg by mouth As Needed., Disp: , Rfl:   •  aspirin 81 MG EC tablet, Take 81 mg by mouth Daily., Disp: , Rfl:   •  losartan (COZAAR) 50 MG tablet, TAKE 1 TABLET BY MOUTH EVERY DAY, Disp: 30 tablet, Rfl: 6  •  metoprolol tartrate (LOPRESSOR) 50 MG tablet, Take 1 tablet by mouth 2 (Two) Times a Day., Disp: 60 tablet, Rfl: 6  •  tamsulosin (FLOMAX) 0.4 MG capsule 24 hr capsule, Daily., Disp: , Rfl: 3    History of Present Illness: Mr. Meléndez is a 42 y/o male with the above noted past medical history. He presents today for f/u on PAF s/p PVA 9/28/16. Patient was doing well until recently when he felt like he was going in and out of rhythm. He was set up for a 30 day event monitor which he wore from 3/7-4/5. This demonstrated mostly NSR with PVCs and a couple short runs of PSVT both of which were associated with skipped beats, racing heart and dizziness. Patient also notes sob during these episodes. He did not push the button with every episodes. He reports now they are of occuring less frequently about 10 times per day. He does note that he is getting more used to this. No issues with chest pain, shortness of breath, fevers, chills, night sweats, PND, orthopnea or palpitations. No recent ER visits or hospital stays.    The following portions of the patient's history were reviewed and updated as appropriate: allergies, current medications, past family history, past medical history, past social history, past surgical history and problem list.    ROS   14 point ROS negative except as outlined in problem list, HPI and other parts of the note.    Procedures       Objective:       Vitals:    05/14/18 0918   BP: 118/80   BP Location: Right arm   Patient Position: Sitting   Pulse: 81   Weight: 101 kg (223 lb 3.2 oz)   Height: 185.4 cm (73\")     Body mass index is 29.45 kg/m².    Physical Exam:  GENERAL: Well-developed, well-nourished " patient in no acute distress.  HEENT: Normocephalic, atraumatic, PERRLA. Moist mucous membranes.  NECK: No JVD present at 30°. No carotid bruits auscultated.  LUNGS: Non labored. Clear to auscultation.  CARDIOVASCULAR: Regular rate and rhythm. No murmurs, gallops or rubs noted.   ABDOMEN: Soft, nontender. Non-distended. positive bowel sounds.  MUSCULOSKELETAL: No gross deformities. No clubbing, cyanosis, or lower extremity edema.  SKIN: Pink, warm.  Neuro: Nonfocal exam grossly intact  Ext: No edema or bruising    The patient's old records including ambulatory rhythm recordings (ECGs, Holter/event monitor) were reviewed and discussed.      Lab Review:   Results for orders placed or performed during the hospital encounter of 09/06/17   Comprehensive Metabolic Panel   Result Value Ref Range    Glucose 106 (H) 70 - 100 mg/dL    BUN 14 9 - 23 mg/dL    Creatinine 0.90 0.60 - 1.30 mg/dL    Sodium 138 132 - 146 mmol/L    Potassium 3.9 3.5 - 5.5 mmol/L    Chloride 103 99 - 109 mmol/L    CO2 29.0 20.0 - 31.0 mmol/L    Calcium 9.5 8.7 - 10.4 mg/dL    Total Protein 7.2 5.7 - 8.2 g/dL    Albumin 4.20 3.20 - 4.80 g/dL    ALT (SGPT) 35 7 - 40 U/L    AST (SGOT) 19 0 - 33 U/L    Alkaline Phosphatase 67 25 - 100 U/L    Total Bilirubin 1.3 (H) 0.3 - 1.2 mg/dL    eGFR Non African Amer 92 >60 mL/min/1.73    Globulin 3.0 gm/dL    A/G Ratio 1.4 (L) 1.5 - 2.5 g/dL    BUN/Creatinine Ratio 15.6 7.0 - 25.0    Anion Gap 6.0 3.0 - 11.0 mmol/L   Lipase   Result Value Ref Range    Lipase 28 6 - 51 U/L   Urinalysis With / Culture If Indicated   Result Value Ref Range    Color, UA Yellow Yellow, Straw    Appearance, UA Clear Clear    pH, UA <=5.0 5.0 - 8.0    Specific Gravity, UA >=1.030 1.001 - 1.030    Glucose, UA Negative Negative    Ketones, UA Negative Negative    Bilirubin, UA Negative Negative    Blood, UA Negative Negative    Protein, UA Negative Negative    Leuk Esterase, UA Negative Negative    Nitrite, UA Negative Negative     Urobilinogen, UA 0.2 E.U./dL 0.2 - 1.0 E.U./dL   CBC Auto Differential   Result Value Ref Range    WBC 8.23 3.50 - 10.80 10*3/mm3    RBC 5.02 4.20 - 5.76 10*6/mm3    Hemoglobin 16.1 13.1 - 17.5 g/dL    Hematocrit 45.7 38.9 - 50.9 %    MCV 91.0 80.0 - 99.0 fL    MCH 32.1 (H) 27.0 - 31.0 pg    MCHC 35.2 32.0 - 36.0 g/dL    RDW 12.3 11.3 - 14.5 %    RDW-SD 41.4 37.0 - 54.0 fl    MPV 11.2 6.0 - 12.0 fL    Platelets 218 150 - 450 10*3/mm3    Neutrophil % 56.4 41.0 - 71.0 %    Lymphocyte % 32.6 24.0 - 44.0 %    Monocyte % 6.6 0.0 - 12.0 %    Eosinophil % 4.0 (H) 0.0 - 3.0 %    Basophil % 0.4 0.0 - 1.0 %    Immature Grans % 0.0 0.0 - 0.6 %    Neutrophils, Absolute 4.65 1.50 - 8.30 10*3/mm3    Lymphocytes, Absolute 2.68 0.60 - 4.80 10*3/mm3    Monocytes, Absolute 0.54 0.00 - 1.00 10*3/mm3    Eosinophils, Absolute 0.33 (H) 0.00 - 0.30 10*3/mm3    Basophils, Absolute 0.03 0.00 - 0.20 10*3/mm3    Immature Grans, Absolute 0.00 0.00 - 0.03 10*3/mm3   C-reactive Protein   Result Value Ref Range    C-Reactive Protein 0.13 0.00 - 1.00 mg/dL   Sedimentation Rate   Result Value Ref Range    Sed Rate 5 0 - 15 mm/hr   Light Blue Top   Result Value Ref Range    Extra Tube hold for add-on    Green Top (Gel)   Result Value Ref Range    Extra Tube Hold for add-ons.    Lavender Top   Result Value Ref Range    Extra Tube hold for add-on    Gold Top - SST   Result Value Ref Range    Extra Tube Hold for add-ons.            Diagnosis:   1. PVC's (premature ventricular contractions)  2. Palpitations  3. PSVT (paroxysmal supraventricular tachycardia)  4. Paroxysmal atrial fibrillation    Assessment & Plan:   1) Palpitations/PVCs/PSVT- event monitor 3/4-3/5 with mostly NSR with PVC's and a couple short runs of PSVT a/w skipped beats, racing heart rates. Event monitor does not provide a PVC burden and only reports ectopy/arrythmia if patient documented symptoms. Patient admits to only documenting symptoms some of the time. At least 2 different  morphologies but one most common.   Discussed restarting Flecainide vs. Ablation.   I think for now be best to restart the patient on flecainide 50 mg twice a day for what seems to be PVC-induced symptoms.  If continues to have symptoms on medical therapy then at that time consideration for an EP study plus or minus ablation for these PVCs.  Normal ejection fraction in the past. EF 60% 2017. EKG on [place].     2) PAF, s/p PVA 2016 without recurrence per two event monitors. Taken off A/C and AAD in the past. No need to restart AC for now just continue on ASA, no recurrences of Afib noted on monitor.     3) Follow up in 5 mths or sooner as needed.    Scribed for Kai Mitchell DO by WONG Akers, MATTIE. 5/14/2018  9:46 AM     I, Kai Mitchell DO, personally performed the services described in this documentation as scribed by the above named individual in my presence, and it is both accurate and complete.  5/14/2018  9:58 AM    Kai Mitchell DO  9:58 AM  05/14/18            EMR Dragon/Transcription disclaimer:  Much of this encounter note is an electronic transcription/translation of spoken language to printed text. Electronic translation of spoken language may permit erroneous, or at times, nonsensical words or phrases to be inadvertently transcribed. Although I have reviewed the note for such errors, some may still exist.

## 2018-05-16 ENCOUNTER — CLINICAL SUPPORT (OUTPATIENT)
Dept: CARDIOLOGY | Facility: CLINIC | Age: 43
End: 2018-05-16

## 2018-05-16 DIAGNOSIS — I48.20 CHRONIC ATRIAL FIBRILLATION (HCC): Primary | ICD-10-CM

## 2018-05-16 PROCEDURE — 93000 ELECTROCARDIOGRAM COMPLETE: CPT | Performed by: INTERNAL MEDICINE

## 2018-05-29 RX ORDER — METOPROLOL TARTRATE 50 MG/1
TABLET, FILM COATED ORAL
Qty: 60 TABLET | Refills: 5 | Status: SHIPPED | OUTPATIENT
Start: 2018-05-29 | End: 2018-12-26 | Stop reason: SDUPTHER

## 2018-10-01 RX ORDER — AMLODIPINE BESYLATE 5 MG/1
TABLET ORAL
Qty: 30 TABLET | Refills: 6 | Status: SHIPPED | OUTPATIENT
Start: 2018-10-01 | End: 2018-11-08 | Stop reason: DRUGHIGH

## 2018-11-07 ENCOUNTER — TELEPHONE (OUTPATIENT)
Dept: CARDIOLOGY | Facility: CLINIC | Age: 43
End: 2018-11-07

## 2018-11-07 NOTE — TELEPHONE ENCOUNTER
Patient has been feeling extremely tired and his calves are aching really bad. His BP has been 152/109, 142/99 and 142/98. He wants to know if we can adjust his BP medication? He currently takes Norvasc 5 mg daily, Cozaar 50 mg daily, Metoprolol 50 mg BID and Flecainide 50 mg BID. He feels like he is in NSR and has only been taking Aspirin 81 mg daily. Please advise.

## 2018-11-08 RX ORDER — AMLODIPINE BESYLATE 10 MG/1
10 TABLET ORAL DAILY
Qty: 30 TABLET | Refills: 6 | Status: SHIPPED | OUTPATIENT
Start: 2018-11-08 | End: 2019-05-29 | Stop reason: SDUPTHER

## 2018-11-12 ENCOUNTER — OFFICE VISIT (OUTPATIENT)
Dept: CARDIOLOGY | Facility: CLINIC | Age: 43
End: 2018-11-12

## 2018-11-12 VITALS
DIASTOLIC BLOOD PRESSURE: 78 MMHG | BODY MASS INDEX: 29.79 KG/M2 | HEIGHT: 73 IN | WEIGHT: 224.8 LBS | SYSTOLIC BLOOD PRESSURE: 112 MMHG | HEART RATE: 75 BPM

## 2018-11-12 DIAGNOSIS — I48.0 PAROXYSMAL ATRIAL FIBRILLATION (HCC): ICD-10-CM

## 2018-11-12 DIAGNOSIS — I10 ESSENTIAL HYPERTENSION: ICD-10-CM

## 2018-11-12 DIAGNOSIS — I49.3 PVC'S (PREMATURE VENTRICULAR CONTRACTIONS): Primary | ICD-10-CM

## 2018-11-12 PROCEDURE — 99213 OFFICE O/P EST LOW 20 MIN: CPT | Performed by: NURSE PRACTITIONER

## 2018-11-12 NOTE — PROGRESS NOTES
Subjective:   Mikie Meléndez  1975  [phone]      05/14/2018    [facility] CARDIOLOGY    Heather Ibarra MD  [address] [place] [id number]        Patient ID: Mikie Meléndez is a 43 y.o. male.    Chief Complaint:   Chief Complaint   Patient presents with   • Atrial Fibrillation   • PVC'S     Problem List:  1. Paroxysmal Atrial Fibrillation   a. History of previous stress testing and Holter monitoring, data insufficient.   b. Sinus rhythm by EKG.   c. Holter monitor showing sinus rhythm. Rare PVCs. One short run of atrial tachycardia. No AV block or pauses.  d. Echocardiogram with a preliminary report, 12/29/2014. EF 55% to 60%. Left atrium at 3.7 cm.  e. Dec 31 st diagnosis of Afib with RVR. ECV at  on jan 1st with initiation of Xarelto; stopped and changed to Eliquis   f. Echocardiogram 5/19/2016: EF 40-45%. Mild MR. Mild physiologic TR present. ? Tachy induced CMP  g. Cardiac catheterization 6/2/2016: EF 65%. Normal left ventricular function. Geographically normal coronary arteries.  h. Echocardiogram 6/24/2016: EF 50%. No thrombus or mass detected in left atrium or left atrial appendage. Mild left atrial enlargement. Trace intermittent MR.  i. Event Monitor 5/24/2016-6/23/2016 showing runs of PVCs and PACs (half time PAC and other PVCs). No Afib. At times symptoms of palpitations, SOB correlated with ectopy and at other times NSR with no abnormalities.   j. Cardioversion in January and June 2016. Started on Flec 50 mg BID, Toprol and Eliquis  k. S/p PVA 9/28/16, in SR today. Last episode that he has noticed was 6 weeks ago  2. Dyslipidemia.   3. Excessive caffeine intake.   4. Family history of tachycardia.   5. Surgical history:   a. Lithotripsy.   b. Appendectomy.   c. Deviated septum repair.   d. Herniorrhaphy.   e. Back surgery.   f. Recent surgery of Lasik surgery    No Known Allergies    Current Outpatient Medications:   •  amLODIPine (NORVASC) 10 MG tablet, Take  1 tablet by mouth Daily., Disp: 30 tablet, Rfl: 6  •  aspirin 81 MG EC tablet, Take 81 mg by mouth Daily., Disp: , Rfl:   •  flecainide (TAMBOCOR) 50 MG tablet, Take 1 tablet by mouth 2 (Two) Times a Day., Disp: 60 tablet, Rfl: 6  •  losartan (COZAAR) 50 MG tablet, TAKE 1 TABLET BY MOUTH EVERY DAY, Disp: 30 tablet, Rfl: 6  •  metoprolol tartrate (LOPRESSOR) 50 MG tablet, TAKE ONE TABLET BY MOUTH TWICE A DAY, Disp: 60 tablet, Rfl: 5    History of Present Illness: Mr. Meléndez is a 42 y/o male with the above noted past medical history. He presents today for f/u on PAF s/p PVA 9/28/16. Patient was doing well until recently when he felt like he was going in and out of rhythm. He wore a 30 day event monitor from 3/7-4/5 which demonstrated mostly NSR with PVCs and a couple short runs of PSVT both of which were associated with skipped beats, racing heart and dizziness. Patient was started on Flecainide and has been doing well. Still some palpitations but not as bothersome or as frequent. No issues with chest pain, shortness of breath, fevers, chills, night sweats, PND, orthopnea or palpitations. No recent ER visits or hospital stays. Reports some fatigue over the last month as well as a dull ache in bilateral lower extremities that does not change with ambulation, movement. No other complaints. BP is better controlled with recent increase in Norvasc 10 mg once daily.    The following portions of the patient's history were reviewed and updated as appropriate: allergies, current medications, past family history, past medical history, past social history, past surgical history and problem list.    ROS   14 point ROS negative except as outlined in problem list, HPI and other parts of the note.      ECG 12 Lead  Date/Time: 11/13/2018 11:28 AM  Performed by: Jacquelyn Akers APRN  Authorized by: Jacquelyn Akers APRN   Comparison: compared with previous ECG from 5/16/2018  Rhythm: sinus rhythm  BPM: 75                "  Objective:       Vitals:    11/12/18 1556   BP: 112/78   BP Location: Left arm   Patient Position: Sitting   Pulse: 75   Weight: 102 kg (224 lb 12.8 oz)   Height: 185.4 cm (73\")     Body mass index is 29.66 kg/m².    Physical Exam:  GENERAL: Well-developed, well-nourished patient in no acute distress.  HEENT: Normocephalic, atraumatic, PERRLA. Moist mucous membranes.  NECK: No JVD present at 30°. No carotid bruits auscultated.  LUNGS: Non labored. Clear to auscultation.  CARDIOVASCULAR: Regular rate and rhythm. No murmurs, gallops or rubs noted.   ABDOMEN: Soft, nontender. Non-distended. positive bowel sounds.  MUSCULOSKELETAL: No gross deformities. No clubbing, cyanosis, or lower extremity edema.  SKIN: Pink, warm.  Neuro: Nonfocal exam grossly intact  Ext: No edema or bruising    The patient's old records including ambulatory rhythm recordings (ECGs, Holter/event monitor) were reviewed and discussed.      Lab Review:   Results for orders placed or performed during the hospital encounter of 09/06/17   Comprehensive Metabolic Panel   Result Value Ref Range    Glucose 106 (H) 70 - 100 mg/dL    BUN 14 9 - 23 mg/dL    Creatinine 0.90 0.60 - 1.30 mg/dL    Sodium 138 132 - 146 mmol/L    Potassium 3.9 3.5 - 5.5 mmol/L    Chloride 103 99 - 109 mmol/L    CO2 29.0 20.0 - 31.0 mmol/L    Calcium 9.5 8.7 - 10.4 mg/dL    Total Protein 7.2 5.7 - 8.2 g/dL    Albumin 4.20 3.20 - 4.80 g/dL    ALT (SGPT) 35 7 - 40 U/L    AST (SGOT) 19 0 - 33 U/L    Alkaline Phosphatase 67 25 - 100 U/L    Total Bilirubin 1.3 (H) 0.3 - 1.2 mg/dL    eGFR Non African Amer 92 >60 mL/min/1.73    Globulin 3.0 gm/dL    A/G Ratio 1.4 (L) 1.5 - 2.5 g/dL    BUN/Creatinine Ratio 15.6 7.0 - 25.0    Anion Gap 6.0 3.0 - 11.0 mmol/L   Lipase   Result Value Ref Range    Lipase 28 6 - 51 U/L   Urinalysis With / Culture If Indicated   Result Value Ref Range    Color, UA Yellow Yellow, Straw    Appearance, UA Clear Clear    pH, UA <=5.0 5.0 - 8.0    Specific Gravity, " UA >=1.030 1.001 - 1.030    Glucose, UA Negative Negative    Ketones, UA Negative Negative    Bilirubin, UA Negative Negative    Blood, UA Negative Negative    Protein, UA Negative Negative    Leuk Esterase, UA Negative Negative    Nitrite, UA Negative Negative    Urobilinogen, UA 0.2 E.U./dL 0.2 - 1.0 E.U./dL   CBC Auto Differential   Result Value Ref Range    WBC 8.23 3.50 - 10.80 10*3/mm3    RBC 5.02 4.20 - 5.76 10*6/mm3    Hemoglobin 16.1 13.1 - 17.5 g/dL    Hematocrit 45.7 38.9 - 50.9 %    MCV 91.0 80.0 - 99.0 fL    MCH 32.1 (H) 27.0 - 31.0 pg    MCHC 35.2 32.0 - 36.0 g/dL    RDW 12.3 11.3 - 14.5 %    RDW-SD 41.4 37.0 - 54.0 fl    MPV 11.2 6.0 - 12.0 fL    Platelets 218 150 - 450 10*3/mm3    Neutrophil % 56.4 41.0 - 71.0 %    Lymphocyte % 32.6 24.0 - 44.0 %    Monocyte % 6.6 0.0 - 12.0 %    Eosinophil % 4.0 (H) 0.0 - 3.0 %    Basophil % 0.4 0.0 - 1.0 %    Immature Grans % 0.0 0.0 - 0.6 %    Neutrophils, Absolute 4.65 1.50 - 8.30 10*3/mm3    Lymphocytes, Absolute 2.68 0.60 - 4.80 10*3/mm3    Monocytes, Absolute 0.54 0.00 - 1.00 10*3/mm3    Eosinophils, Absolute 0.33 (H) 0.00 - 0.30 10*3/mm3    Basophils, Absolute 0.03 0.00 - 0.20 10*3/mm3    Immature Grans, Absolute 0.00 0.00 - 0.03 10*3/mm3   C-reactive Protein   Result Value Ref Range    C-Reactive Protein 0.13 0.00 - 1.00 mg/dL   Sedimentation Rate   Result Value Ref Range    Sed Rate 5 0 - 15 mm/hr   Light Blue Top   Result Value Ref Range    Extra Tube hold for add-on    Green Top (Gel)   Result Value Ref Range    Extra Tube Hold for add-ons.    Lavender Top   Result Value Ref Range    Extra Tube hold for add-on    Gold Top - SST   Result Value Ref Range    Extra Tube Hold for add-ons.            Diagnosis:   1. PVC's (premature ventricular contractions)  2. Palpitations  3. PSVT (paroxysmal supraventricular tachycardia)  4. Paroxysmal atrial fibrillation  Assessment & Plan:   1) Palpitations/PVCs/PSVT- event monitor 3/4-3/5 with mostly NSR with PVC's and a  couple short runs of PSVT a/w skipped beats, racing heart rates. Event monitor does not provide a PVC burden and only reports ectopy/arrythmia if patient documented symptoms. Patient admits to only documenting symptoms some of the time. At least 2 different morphologies but one most common. Started on Flecainide with improvement in symptoms, no longer bothersome. QRS okay.  For now, continue current regimen.   In the long run, If he were to have symptoms on medical therapy then at that time consideration for an EP study plus or minus ablation for these PVCs.      2) PAF, s/p PVA 2016 without recurrence per two event monitors. Taken off A/C and AAD in the past. No need to restart AC for now since no documentation of Afib. Will continue ASA only.      3) HTN, controlled/improved with recent increase in Norvasc to 10 mg once daily    Follow up in 6 mths or sooner as needed.      [facility]  11/12/2018  4:08 PM

## 2018-11-13 PROCEDURE — 93000 ELECTROCARDIOGRAM COMPLETE: CPT | Performed by: NURSE PRACTITIONER

## 2018-11-27 RX ORDER — FLECAINIDE ACETATE 50 MG/1
TABLET ORAL
Qty: 60 TABLET | Refills: 11 | Status: SHIPPED | OUTPATIENT
Start: 2018-11-27 | End: 2019-11-26 | Stop reason: SDUPTHER

## 2018-12-26 RX ORDER — METOPROLOL TARTRATE 50 MG/1
TABLET, FILM COATED ORAL
Qty: 60 TABLET | Refills: 6 | Status: SHIPPED | OUTPATIENT
Start: 2018-12-26 | End: 2019-07-25 | Stop reason: SDUPTHER

## 2019-01-28 RX ORDER — LOSARTAN POTASSIUM 50 MG/1
TABLET ORAL
Qty: 30 TABLET | Refills: 5 | Status: SHIPPED | OUTPATIENT
Start: 2019-01-28 | End: 2019-07-24 | Stop reason: SDUPTHER

## 2019-02-18 ENCOUNTER — OFFICE VISIT (OUTPATIENT)
Dept: CARDIOLOGY | Facility: CLINIC | Age: 44
End: 2019-02-18

## 2019-02-18 VITALS
SYSTOLIC BLOOD PRESSURE: 110 MMHG | DIASTOLIC BLOOD PRESSURE: 78 MMHG | BODY MASS INDEX: 30.32 KG/M2 | HEIGHT: 73 IN | WEIGHT: 228.8 LBS

## 2019-02-18 DIAGNOSIS — I47.1 PSVT (PAROXYSMAL SUPRAVENTRICULAR TACHYCARDIA) (HCC): ICD-10-CM

## 2019-02-18 DIAGNOSIS — I49.3 PVC'S (PREMATURE VENTRICULAR CONTRACTIONS): ICD-10-CM

## 2019-02-18 DIAGNOSIS — I48.0 PAROXYSMAL ATRIAL FIBRILLATION (HCC): Primary | ICD-10-CM

## 2019-02-18 DIAGNOSIS — I48.0 PAF (PAROXYSMAL ATRIAL FIBRILLATION) (HCC): Primary | ICD-10-CM

## 2019-02-18 DIAGNOSIS — I10 ESSENTIAL HYPERTENSION: ICD-10-CM

## 2019-02-18 PROCEDURE — 93000 ELECTROCARDIOGRAM COMPLETE: CPT | Performed by: NURSE PRACTITIONER

## 2019-02-18 PROCEDURE — 99213 OFFICE O/P EST LOW 20 MIN: CPT | Performed by: NURSE PRACTITIONER

## 2019-02-18 RX ORDER — OMEPRAZOLE 40 MG/1
40 CAPSULE, DELAYED RELEASE ORAL DAILY
Refills: 10 | COMMUNITY
Start: 2019-02-11 | End: 2019-04-11

## 2019-02-18 RX ORDER — FENOFIBRATE 145 MG/1
145 TABLET, COATED ORAL DAILY
Refills: 2 | COMMUNITY
Start: 2019-02-10 | End: 2021-03-08

## 2019-02-18 NOTE — PROGRESS NOTES
Subjective:   Mikie Meléndez  1975  [phone]    05/14/2018    [facility] CARDIOLOGY    Heather Ibarra MD  [address] [place] [id number]        Patient ID: Mikie Meléndez is a 43 y.o. male.    Chief Complaint:   Chief Complaint   Patient presents with   • PVC'S     Problem List:  1. Paroxysmal Atrial Fibrillation   a. History of previous stress testing and Holter monitoring, data insufficient.   b. Sinus rhythm by EKG.   c. Holter monitor showing sinus rhythm. Rare PVCs. One short run of atrial tachycardia. No AV block or pauses.  d. Echocardiogram with a preliminary report, 12/29/2014. EF 55% to 60%. Left atrium at 3.7 cm.  e. Dec 31 st diagnosis of Afib with RVR. ECV at  on jan 1st with initiation of Xarelto; stopped and changed to Eliquis   f. Echocardiogram 5/19/2016: EF 40-45%. Mild MR. Mild physiologic TR present. ? Tachy induced CMP  g. Cardiac catheterization 6/2/2016: EF 65%. Normal left ventricular function. Geographically normal coronary arteries.  h. Echocardiogram 6/24/2016: EF 50%. No thrombus or mass detected in left atrium or left atrial appendage. Mild left atrial enlargement. Trace intermittent MR.  i. Event Monitor 5/24/2016-6/23/2016 showing runs of PVCs and PACs (half time PAC and other PVCs). No Afib. At times symptoms of palpitations, SOB correlated with ectopy and at other times NSR with no abnormalities.   j. Cardioversion in January and June 2016. Started on Flec 50 mg BID, Toprol and Eliquis  k. S/p PVA 9/28/16, SD  l. Event monitor from 3/7-4/5/18 demonstrated mostly NSR with PVCs and a couple short runs of PSVT both of which were associated with skipped beats, racing heart and dizziness, subsequently re-started on Flec.   2. Dyslipidemia.   3. Excessive caffeine intake.   4. Family history of tachycardia.   5. Surgical history:   a. Lithotripsy.   b. Appendectomy.   c. Deviated septum repair.   d. Herniorrhaphy.   e. Back surgery.   f. Recent  surgery of Lasik surgery    No Known Allergies    Current Outpatient Medications:   •  amLODIPine (NORVASC) 10 MG tablet, Take 1 tablet by mouth Daily., Disp: 30 tablet, Rfl: 6  •  aspirin 81 MG EC tablet, Take 81 mg by mouth Daily., Disp: , Rfl:   •  fenofibrate (TRICOR) 145 MG tablet, Take 145 mg by mouth Daily., Disp: , Rfl: 2  •  flecainide (TAMBOCOR) 50 MG tablet, TAKE 1 TABLET BY MOUTH TWICE A DAY, Disp: 60 tablet, Rfl: 11  •  losartan (COZAAR) 50 MG tablet, TAKE 1 TABLET BY MOUTH EVERY DAY, Disp: 30 tablet, Rfl: 5  •  metoprolol tartrate (LOPRESSOR) 50 MG tablet, TAKE 1 TABLET BY MOUTH TWICE A DAY, Disp: 60 tablet, Rfl: 6  •  omeprazole (priLOSEC) 40 MG capsule, Take 40 mg by mouth Daily., Disp: , Rfl: 10    Atrial Fibrillation   Past medical history includes atrial fibrillation.   HPI: Mr. Meléndez is a 44 y/o male with the above noted past medical history. He presents today for f/u on PAF s/p PVA 9/28/16. He wore a 30 day event monitor from 3/7-4/5/18 which demonstrated mostly NSR with PVCs and a couple short runs of PSVT both of which were associated with skipped beats, racing heart and dizziness. Patient was started on Flecainide. He was doing well until th last 5 days to 2 weeks. He reports palpitations are lasting longer and occurring more frequently. Similar to when he had Afib but exactly the same. He reports he has been more tired lately as well. No issues with chest pain, shortness of breath, fevers, chills, night sweats, PND, orthopnea or palpitations. No recent ER visits or hospital stays.     The following portions of the patient's history were reviewed and updated as appropriate: allergies, current medications, past family history, past medical history, past social history, past surgical history and problem list.    ROS   14 point ROS negative except as outlined in problem list, HPI and other parts of the note.      ECG 12 Lead  Date/Time: 2/18/2019 9:39 AM  Performed by: Jacquelyn Akers  "APRN  Authorized by: Jacquelyn Akers APRN   Comparison: compared with previous ECG from 11/13/2018  Rhythm: sinus rhythm  BPM: 73                 Objective:       Vitals:    02/18/19 0913   BP: 110/78   BP Location: Left arm   Patient Position: Sitting   Cuff Size: Adult   Weight: 104 kg (228 lb 12.8 oz)   Height: 185.4 cm (73\")     Body mass index is 30.19 kg/m².    Physical Exam:  GENERAL: Well-developed, well-nourished patient in no acute distress.  HEENT: Normocephalic, atraumatic, PERRLA. Moist mucous membranes.  NECK: No JVD present at 30°. No carotid bruits auscultated.  LUNGS: Non labored. Clear to auscultation.  CARDIOVASCULAR: Regular rate and rhythm. No murmurs, gallops or rubs noted.   ABDOMEN: Soft, nontender. Non-distended. positive bowel sounds.  MUSCULOSKELETAL: No gross deformities. No clubbing, cyanosis, or lower extremity edema.  SKIN: Pink, warm.  Neuro: Nonfocal exam grossly intact  Ext: No edema or bruising    The patient's old records including ambulatory rhythm recordings (ECGs, Holter/event monitor) were reviewed and discussed.      Lab Review:   Results for orders placed or performed during the hospital encounter of 09/06/17   Comprehensive Metabolic Panel   Result Value Ref Range    Glucose 106 (H) 70 - 100 mg/dL    BUN 14 9 - 23 mg/dL    Creatinine 0.90 0.60 - 1.30 mg/dL    Sodium 138 132 - 146 mmol/L    Potassium 3.9 3.5 - 5.5 mmol/L    Chloride 103 99 - 109 mmol/L    CO2 29.0 20.0 - 31.0 mmol/L    Calcium 9.5 8.7 - 10.4 mg/dL    Total Protein 7.2 5.7 - 8.2 g/dL    Albumin 4.20 3.20 - 4.80 g/dL    ALT (SGPT) 35 7 - 40 U/L    AST (SGOT) 19 0 - 33 U/L    Alkaline Phosphatase 67 25 - 100 U/L    Total Bilirubin 1.3 (H) 0.3 - 1.2 mg/dL    eGFR Non African Amer 92 >60 mL/min/1.73    Globulin 3.0 gm/dL    A/G Ratio 1.4 (L) 1.5 - 2.5 g/dL    BUN/Creatinine Ratio 15.6 7.0 - 25.0    Anion Gap 6.0 3.0 - 11.0 mmol/L   Lipase   Result Value Ref Range    Lipase 28 6 - 51 U/L   Urinalysis With " / Culture If Indicated   Result Value Ref Range    Color, UA Yellow Yellow, Straw    Appearance, UA Clear Clear    pH, UA <=5.0 5.0 - 8.0    Specific Gravity, UA >=1.030 1.001 - 1.030    Glucose, UA Negative Negative    Ketones, UA Negative Negative    Bilirubin, UA Negative Negative    Blood, UA Negative Negative    Protein, UA Negative Negative    Leuk Esterase, UA Negative Negative    Nitrite, UA Negative Negative    Urobilinogen, UA 0.2 E.U./dL 0.2 - 1.0 E.U./dL   CBC Auto Differential   Result Value Ref Range    WBC 8.23 3.50 - 10.80 10*3/mm3    RBC 5.02 4.20 - 5.76 10*6/mm3    Hemoglobin 16.1 13.1 - 17.5 g/dL    Hematocrit 45.7 38.9 - 50.9 %    MCV 91.0 80.0 - 99.0 fL    MCH 32.1 (H) 27.0 - 31.0 pg    MCHC 35.2 32.0 - 36.0 g/dL    RDW 12.3 11.3 - 14.5 %    RDW-SD 41.4 37.0 - 54.0 fl    MPV 11.2 6.0 - 12.0 fL    Platelets 218 150 - 450 10*3/mm3    Neutrophil % 56.4 41.0 - 71.0 %    Lymphocyte % 32.6 24.0 - 44.0 %    Monocyte % 6.6 0.0 - 12.0 %    Eosinophil % 4.0 (H) 0.0 - 3.0 %    Basophil % 0.4 0.0 - 1.0 %    Immature Grans % 0.0 0.0 - 0.6 %    Neutrophils, Absolute 4.65 1.50 - 8.30 10*3/mm3    Lymphocytes, Absolute 2.68 0.60 - 4.80 10*3/mm3    Monocytes, Absolute 0.54 0.00 - 1.00 10*3/mm3    Eosinophils, Absolute 0.33 (H) 0.00 - 0.30 10*3/mm3    Basophils, Absolute 0.03 0.00 - 0.20 10*3/mm3    Immature Grans, Absolute 0.00 0.00 - 0.03 10*3/mm3   C-reactive Protein   Result Value Ref Range    C-Reactive Protein 0.13 0.00 - 1.00 mg/dL   Sedimentation Rate   Result Value Ref Range    Sed Rate 5 0 - 15 mm/hr   Light Blue Top   Result Value Ref Range    Extra Tube hold for add-on    Green Top (Gel)   Result Value Ref Range    Extra Tube Hold for add-ons.    Lavender Top   Result Value Ref Range    Extra Tube hold for add-on    Gold Top - SST   Result Value Ref Range    Extra Tube Hold for add-ons.            Diagnosis:   1. PVC's (premature ventricular contractions)  2. Palpitations  3. PSVT (paroxysmal  supraventricular tachycardia)  4. Paroxysmal atrial fibrillation  Assessment & Plan:   1) Palpitations/PVCs/PSVT- event monitor 3/4-3/5/18 with mostly NSR with PVC's and a couple short runs of PSVT a/w skipped beats, racing heart rates.  At least 2 different morphologies but one most common. Started on Flecainide with improvement in symptoms, no longer bothersome. Symptoms have worsened over the last two weeks and feels sort of similar to Afib. QRS okay.  Will repeat two week event monitor and adjust meds accordingly.    2) PAF, s/p PVA 2016 without recurrence per two event monitors. Taken off A/C and AAD in the past. No need to restart AC for now since no documentation of Afib. Will continue ASA for now pending event monitor.     3) HTN, controlled/improved   Continue current meds    Follow up in 3-4 mths or sooner as needed.      Electronically signed by MATTIE Lebron, 02/18/19, 9:09 AM.

## 2019-02-28 ENCOUNTER — TELEPHONE (OUTPATIENT)
Dept: CARDIOLOGY | Facility: CLINIC | Age: 44
End: 2019-02-28

## 2019-03-11 ENCOUNTER — TELEPHONE (OUTPATIENT)
Dept: CARDIOLOGY | Facility: CLINIC | Age: 44
End: 2019-03-11

## 2019-03-11 NOTE — TELEPHONE ENCOUNTER
Patient just wanted to let you know that he has been feeling extremely tired all of the time. He also said that he feels worse when he lays on his left side. He just wants to know what your plan is since he wore the heart monitor?

## 2019-03-13 NOTE — TELEPHONE ENCOUNTER
No Afib but did have rare runs of Atach which likely explain symptoms. Stay on ASA. If symptoms are persisting and bothersome, would increase flec to 75 mg bid and EKG in 48 hours otherwise no change

## 2019-03-13 NOTE — TELEPHONE ENCOUNTER
Dr. Rossi wants you to address this patient's monitor results and his symptoms. I will have them put the report on your desk.

## 2019-04-11 ENCOUNTER — HOSPITAL ENCOUNTER (EMERGENCY)
Facility: HOSPITAL | Age: 44
Discharge: HOME | End: 2019-04-11
Attending: EMERGENCY MEDICINE | Admitting: EMERGENCY MEDICINE

## 2019-04-11 ENCOUNTER — APPOINTMENT (OUTPATIENT)
Dept: CT IMAGING | Facility: HOSPITAL | Age: 44
End: 2019-04-11

## 2019-04-11 VITALS
OXYGEN SATURATION: 98 % | DIASTOLIC BLOOD PRESSURE: 88 MMHG | BODY MASS INDEX: 30.22 KG/M2 | HEART RATE: 72 BPM | SYSTOLIC BLOOD PRESSURE: 135 MMHG | WEIGHT: 228 LBS | TEMPERATURE: 97.6 F | RESPIRATION RATE: 18 BRPM | HEIGHT: 73 IN

## 2019-04-11 DIAGNOSIS — N17.9 AKI (ACUTE KIDNEY INJURY) (HCC): ICD-10-CM

## 2019-04-11 DIAGNOSIS — N20.1 URETERAL CALCULUS, LEFT: Primary | ICD-10-CM

## 2019-04-11 LAB
ALBUMIN SERPL-MCNC: 4.6 G/DL (ref 3.5–5.2)
ALBUMIN/GLOB SERPL: 1.4 G/DL
ALP SERPL-CCNC: 68 U/L (ref 39–117)
ALT SERPL W P-5'-P-CCNC: 34 U/L (ref 1–41)
ANION GAP SERPL CALCULATED.3IONS-SCNC: 13 MMOL/L
AST SERPL-CCNC: 31 U/L (ref 1–40)
BASOPHILS # BLD AUTO: 0.03 10*3/MM3 (ref 0–0.2)
BASOPHILS NFR BLD AUTO: 0.2 % (ref 0–1.5)
BILIRUB SERPL-MCNC: 0.5 MG/DL (ref 0.2–1.2)
BILIRUB UR QL STRIP: NEGATIVE
BUN BLD-MCNC: 21 MG/DL (ref 6–20)
BUN/CREAT SERPL: 12.7 (ref 7–25)
CALCIUM SPEC-SCNC: 9.7 MG/DL (ref 8.6–10.5)
CHLORIDE SERPL-SCNC: 101 MMOL/L (ref 98–107)
CLARITY UR: CLEAR
CO2 SERPL-SCNC: 24 MMOL/L (ref 22–29)
COLOR UR: YELLOW
CREAT BLD-MCNC: 1.65 MG/DL (ref 0.76–1.27)
DEPRECATED RDW RBC AUTO: 40.6 FL (ref 37–54)
EOSINOPHIL # BLD AUTO: 0.45 10*3/MM3 (ref 0–0.4)
EOSINOPHIL NFR BLD AUTO: 3.6 % (ref 0.3–6.2)
ERYTHROCYTE [DISTWIDTH] IN BLOOD BY AUTOMATED COUNT: 12.2 % (ref 12.3–15.4)
GFR SERPL CREATININE-BSD FRML MDRD: 45 ML/MIN/1.73
GLOBULIN UR ELPH-MCNC: 3.2 GM/DL
GLUCOSE BLD-MCNC: 109 MG/DL (ref 65–99)
GLUCOSE UR STRIP-MCNC: NEGATIVE MG/DL
HCT VFR BLD AUTO: 45 % (ref 37.5–51)
HGB BLD-MCNC: 15.8 G/DL (ref 13–17.7)
HGB UR QL STRIP.AUTO: NEGATIVE
HOLD SPECIMEN: NORMAL
HOLD SPECIMEN: NORMAL
IMM GRANULOCYTES # BLD AUTO: 0.03 10*3/MM3 (ref 0–0.05)
IMM GRANULOCYTES NFR BLD AUTO: 0.2 % (ref 0–0.5)
KETONES UR QL STRIP: NEGATIVE
LEUKOCYTE ESTERASE UR QL STRIP.AUTO: NEGATIVE
LIPASE SERPL-CCNC: 27 U/L (ref 13–60)
LYMPHOCYTES # BLD AUTO: 2.73 10*3/MM3 (ref 0.7–3.1)
LYMPHOCYTES NFR BLD AUTO: 21.8 % (ref 19.6–45.3)
MCH RBC QN AUTO: 32 PG (ref 26.6–33)
MCHC RBC AUTO-ENTMCNC: 35.1 G/DL (ref 31.5–35.7)
MCV RBC AUTO: 91.3 FL (ref 79–97)
MONOCYTES # BLD AUTO: 1.2 10*3/MM3 (ref 0.1–0.9)
MONOCYTES NFR BLD AUTO: 9.6 % (ref 5–12)
NEUTROPHILS # BLD AUTO: 8.12 10*3/MM3 (ref 1.4–7)
NEUTROPHILS NFR BLD AUTO: 64.8 % (ref 42.7–76)
NITRITE UR QL STRIP: NEGATIVE
PH UR STRIP.AUTO: 6 [PH] (ref 5–8)
PLATELET # BLD AUTO: 250 10*3/MM3 (ref 140–450)
PMV BLD AUTO: 11.5 FL (ref 6–12)
POTASSIUM BLD-SCNC: 4.1 MMOL/L (ref 3.5–5.2)
PROT SERPL-MCNC: 7.8 G/DL (ref 6–8.5)
PROT UR QL STRIP: NEGATIVE
RBC # BLD AUTO: 4.93 10*6/MM3 (ref 4.14–5.8)
SODIUM BLD-SCNC: 138 MMOL/L (ref 136–145)
SP GR UR STRIP: 1.02 (ref 1–1.03)
UROBILINOGEN UR QL STRIP: NORMAL
WBC NRBC COR # BLD: 12.53 10*3/MM3 (ref 3.4–10.8)
WHOLE BLOOD HOLD SPECIMEN: NORMAL
WHOLE BLOOD HOLD SPECIMEN: NORMAL

## 2019-04-11 PROCEDURE — 81003 URINALYSIS AUTO W/O SCOPE: CPT | Performed by: EMERGENCY MEDICINE

## 2019-04-11 PROCEDURE — 80053 COMPREHEN METABOLIC PANEL: CPT | Performed by: EMERGENCY MEDICINE

## 2019-04-11 PROCEDURE — 99284 EMERGENCY DEPT VISIT MOD MDM: CPT

## 2019-04-11 PROCEDURE — 96375 TX/PRO/DX INJ NEW DRUG ADDON: CPT

## 2019-04-11 PROCEDURE — 83690 ASSAY OF LIPASE: CPT | Performed by: EMERGENCY MEDICINE

## 2019-04-11 PROCEDURE — 74176 CT ABD & PELVIS W/O CONTRAST: CPT

## 2019-04-11 PROCEDURE — 25010000002 KETOROLAC TROMETHAMINE PER 15 MG: Performed by: NURSE PRACTITIONER

## 2019-04-11 PROCEDURE — 25010000002 ONDANSETRON PER 1 MG: Performed by: NURSE PRACTITIONER

## 2019-04-11 PROCEDURE — 96374 THER/PROPH/DIAG INJ IV PUSH: CPT

## 2019-04-11 PROCEDURE — 85025 COMPLETE CBC W/AUTO DIFF WBC: CPT

## 2019-04-11 PROCEDURE — 25010000002 HYDROMORPHONE 1 MG/ML SOLUTION: Performed by: EMERGENCY MEDICINE

## 2019-04-11 RX ORDER — ONDANSETRON 4 MG/1
4 TABLET, ORALLY DISINTEGRATING ORAL 4 TIMES DAILY PRN
Qty: 16 TABLET | Refills: 0 | Status: SHIPPED | OUTPATIENT
Start: 2019-04-11 | End: 2019-10-08

## 2019-04-11 RX ORDER — ONDANSETRON 2 MG/ML
4 INJECTION INTRAMUSCULAR; INTRAVENOUS ONCE
Status: COMPLETED | OUTPATIENT
Start: 2019-04-11 | End: 2019-04-11

## 2019-04-11 RX ORDER — TAMSULOSIN HYDROCHLORIDE 0.4 MG/1
1 CAPSULE ORAL NIGHTLY
COMMUNITY
End: 2019-10-08

## 2019-04-11 RX ORDER — SODIUM CHLORIDE 0.9 % (FLUSH) 0.9 %
10 SYRINGE (ML) INJECTION AS NEEDED
Status: DISCONTINUED | OUTPATIENT
Start: 2019-04-11 | End: 2019-04-11 | Stop reason: HOSPADM

## 2019-04-11 RX ORDER — NITROFURANTOIN 25; 75 MG/1; MG/1
100 CAPSULE ORAL 2 TIMES DAILY
COMMUNITY
End: 2019-10-08

## 2019-04-11 RX ORDER — OXYCODONE HYDROCHLORIDE AND ACETAMINOPHEN 5; 325 MG/1; MG/1
1 TABLET ORAL EVERY 8 HOURS PRN
Qty: 12 TABLET | Refills: 0 | Status: SHIPPED | OUTPATIENT
Start: 2019-04-11 | End: 2019-10-08

## 2019-04-11 RX ORDER — KETOROLAC TROMETHAMINE 15 MG/ML
15 INJECTION, SOLUTION INTRAMUSCULAR; INTRAVENOUS ONCE
Status: COMPLETED | OUTPATIENT
Start: 2019-04-11 | End: 2019-04-11

## 2019-04-11 RX ADMIN — ONDANSETRON 4 MG: 2 INJECTION INTRAMUSCULAR; INTRAVENOUS at 01:54

## 2019-04-11 RX ADMIN — HYDROMORPHONE HYDROCHLORIDE 1 MG: 1 INJECTION, SOLUTION INTRAMUSCULAR; INTRAVENOUS; SUBCUTANEOUS at 04:31

## 2019-04-11 RX ADMIN — SODIUM CHLORIDE 1000 ML: 9 INJECTION, SOLUTION INTRAVENOUS at 04:30

## 2019-04-11 RX ADMIN — KETOROLAC TROMETHAMINE 15 MG: 15 INJECTION, SOLUTION INTRAMUSCULAR; INTRAVENOUS at 01:54

## 2019-04-11 RX ADMIN — SODIUM CHLORIDE 1000 ML: 9 INJECTION, SOLUTION INTRAVENOUS at 01:54

## 2019-04-11 NOTE — ED PROVIDER NOTES
"Subjective   Mikie Meléndez is a 44 yr old male presents emergency department complaints of left flank pain.  Patient reports that the pain started last Thursday.  Patient advised that he had a CT scan and was advised he had a 5 mm kidney stone.  Patient is followed by Dr. Olivas.  Dr. Olivas is out of town.  Patient reports that the pain increased this evening has been radiating pain into his left lower quadrant.  Patient denies any nausea, vomiting.  Patient rates his pain at 8 out of 10.  Patient reports urinary frequency but reports minimal urine out.  Pt Is currently taking Macrobid, Flomax.        History provided by:  Patient  Flank Pain   Pain location:  L flank and LLQ  Pain quality: sharp and stabbing    Pain severity:  Moderate  Timing:  Constant  Relieved by:  Nothing  Worsened by:  Nothing  Associated symptoms: dysuria    Associated symptoms: no chills, no cough, no diarrhea, no fever, no nausea and no vomiting        Review of Systems   Constitutional: Negative for chills and fever.   Respiratory: Negative for cough.    Gastrointestinal: Positive for abdominal pain. Negative for diarrhea, nausea and vomiting.   Genitourinary: Positive for dysuria and flank pain.   All other systems reviewed and are negative.      Past Medical History:   Diagnosis Date   • Excessive caffeine intake    • Hypertension    • Kidney calculi    • PAC (premature atrial contraction)    • PAF (paroxysmal atrial fibrillation) (CMS/HCC)     CHADS-VASc = 1   • PVC (premature ventricular contraction)        No Known Allergies    Past Surgical History:   Procedure Laterality Date   • APPENDECTOMY     • BACK SURGERY     • CARDIAC CATHETERIZATION  06/2016    \"NORMAL\" PER DR. KIMANI PHIPPS   • CARDIAC ELECTROPHYSIOLOGY PROCEDURE N/A 9/28/2016    Procedure: PVA. Stop Flecainide 5 days prior to the procedure. Stop Metoprolol 3 days prior to the procedure.;  Surgeon: Kai Mitchell DO;  Location: [facility] INVASIVE LOCATION;  Service:  "   • CARDIOVERSION  06/24/2016   • EXTRACORPOREAL SHOCK WAVE LITHOTRIPSY (ESWL)     • HERNIA REPAIR     • LASIK     • NASAL SEPTUM SURGERY         Family History   Problem Relation Age of Onset   • Other Other         tachycardia   • No Known Problems Mother    • No Known Problems Father    • Arrhythmia Sister        Social History     Socioeconomic History   • Marital status:      Spouse name: Not on file   • Number of children: Not on file   • Years of education: Not on file   • Highest education level: Not on file   Occupational History   • Occupation: Employed   Tobacco Use   • Smoking status: Never Smoker   • Smokeless tobacco: Never Used   Substance and Sexual Activity   • Alcohol use: No   • Drug use: No   • Sexual activity: Yes     Partners: Female     Birth control/protection: None   Social History Narrative    Patient drinks 1-2 servings of caffeine per day.           Objective   Physical Exam   Constitutional: He is oriented to person, place, and time. He appears well-developed and well-nourished. He is cooperative.  Non-toxic appearance. He appears distressed.   Patient uncomfortable in the bed at this time.   HENT:   Head: Normocephalic and atraumatic.   Mouth/Throat: Oropharynx is clear and moist.   Eyes: Conjunctivae, EOM and lids are normal. Pupils are equal, round, and reactive to light.   Neck: Trachea normal, normal range of motion and full passive range of motion without pain.   Cardiovascular: Regular rhythm, normal heart sounds, intact distal pulses and normal pulses.   Pulmonary/Chest: Effort normal and breath sounds normal. No respiratory distress. He has no decreased breath sounds. He has no wheezes. He has no rhonchi. He has no rales.   Abdominal: Soft. Normal appearance and bowel sounds are normal. He exhibits no distension. There is tenderness (Lt flank pain, LLQ).   Musculoskeletal: Normal range of motion.   Neurological: He is alert and oriented to person, place, and time. He has  normal strength. No cranial nerve deficit.   Skin: Skin is warm, dry and intact. No rash noted.   Psychiatric: He has a normal mood and affect. His speech is normal and behavior is normal.   Nursing note and vitals reviewed.      Procedures           ED Course  ED Course as of Apr 11 0448   Thu Apr 11, 2019   0223 Creatinine: (!) 1.65 [KG]   0224 WBC: (!) 12.53 [KG]   0410 Patient's pain had greatly improved initially however this returned additional meds will be administered at this time.  [KG]      ED Course User Index  [KG] Char Moreno, APRN        Recent Results (from the past 24 hour(s))   Comprehensive Metabolic Panel    Collection Time: 04/11/19  1:09 AM   Result Value Ref Range    Glucose 109 (H) 65 - 99 mg/dL    BUN 21 (H) 6 - 20 mg/dL    Creatinine 1.65 (H) 0.76 - 1.27 mg/dL    Sodium 138 136 - 145 mmol/L    Potassium 4.1 3.5 - 5.2 mmol/L    Chloride 101 98 - 107 mmol/L    CO2 24.0 22.0 - 29.0 mmol/L    Calcium 9.7 8.6 - 10.5 mg/dL    Total Protein 7.8 6.0 - 8.5 g/dL    Albumin 4.60 3.50 - 5.20 g/dL    ALT (SGPT) 34 1 - 41 U/L    AST (SGOT) 31 1 - 40 U/L    Alkaline Phosphatase 68 39 - 117 U/L    Total Bilirubin 0.5 0.2 - 1.2 mg/dL    eGFR Non African Amer 45 (L) >60 mL/min/1.73    Globulin 3.2 gm/dL    A/G Ratio 1.4 g/dL    BUN/Creatinine Ratio 12.7 7.0 - 25.0    Anion Gap 13.0 mmol/L   Lipase    Collection Time: 04/11/19  1:09 AM   Result Value Ref Range    Lipase 27 13 - 60 U/L   Light Blue Top    Collection Time: 04/11/19  1:09 AM   Result Value Ref Range    Extra Tube hold for add-on    Green Top (Gel)    Collection Time: 04/11/19  1:09 AM   Result Value Ref Range    Extra Tube Hold for add-ons.    Lavender Top    Collection Time: 04/11/19  1:09 AM   Result Value Ref Range    Extra Tube hold for add-on    Gold Top - SST    Collection Time: 04/11/19  1:09 AM   Result Value Ref Range    Extra Tube Hold for add-ons.    CBC Auto Differential    Collection Time: 04/11/19  1:09 AM   Result Value Ref  Range    WBC 12.53 (H) 3.40 - 10.80 10*3/mm3    RBC 4.93 4.14 - 5.80 10*6/mm3    Hemoglobin 15.8 13.0 - 17.7 g/dL    Hematocrit 45.0 37.5 - 51.0 %    MCV 91.3 79.0 - 97.0 fL    MCH 32.0 26.6 - 33.0 pg    MCHC 35.1 31.5 - 35.7 g/dL    RDW 12.2 (L) 12.3 - 15.4 %    RDW-SD 40.6 37.0 - 54.0 fl    MPV 11.5 6.0 - 12.0 fL    Platelets 250 140 - 450 10*3/mm3    Neutrophil % 64.8 42.7 - 76.0 %    Lymphocyte % 21.8 19.6 - 45.3 %    Monocyte % 9.6 5.0 - 12.0 %    Eosinophil % 3.6 0.3 - 6.2 %    Basophil % 0.2 0.0 - 1.5 %    Immature Grans % 0.2 0.0 - 0.5 %    Neutrophils, Absolute 8.12 (H) 1.40 - 7.00 10*3/mm3    Lymphocytes, Absolute 2.73 0.70 - 3.10 10*3/mm3    Monocytes, Absolute 1.20 (H) 0.10 - 0.90 10*3/mm3    Eosinophils, Absolute 0.45 (H) 0.00 - 0.40 10*3/mm3    Basophils, Absolute 0.03 0.00 - 0.20 10*3/mm3    Immature Grans, Absolute 0.03 0.00 - 0.05 10*3/mm3   Urinalysis With Microscopic If Indicated (No Culture) - Urine, Clean Catch    Collection Time: 04/11/19  1:49 AM   Result Value Ref Range    Color, UA Yellow Yellow, Straw    Appearance, UA Clear Clear    pH, UA 6.0 5.0 - 8.0    Specific Gravity, UA 1.021 1.001 - 1.030    Glucose, UA Negative Negative    Ketones, UA Negative Negative    Bilirubin, UA Negative Negative    Blood, UA Negative Negative    Protein, UA Negative Negative    Leuk Esterase, UA Negative Negative    Nitrite, UA Negative Negative    Urobilinogen, UA 0.2 E.U./dL 0.2 - 1.0 E.U./dL     Note: In addition to lab results from this visit, the labs listed above may include labs taken at another facility or during a different encounter within the last 24 hours. Please correlate lab times with ED admission and discharge times for further clarification of the services performed during this visit.    CT Abdomen Pelvis Without Contrast   Final Result      1. There is a 4 to 5 mm calculus in the distal left ureter about 1 cm proximal to the ureterovesical junction resulting in approximately moderate  left-sided hydroureteronephrosis. This is new from comparison study of 2017. Mild left-sided perinephric   stranding and there are small intrarenal calculi on the left.   2. Post appendectomy. No bowel obstruction free air or drainable fluid collection.   3. Prior bilateral inguinal hernia repair.            Signer Name: Erika Matthews MD    Signed: 4/11/2019 2:22 AM    Workstation Name: [email or web address]            Vitals:    04/11/19 0322 04/11/19 0330 04/11/19 0400 04/11/19 0431   BP:  (!) 138/102 (!) 133/101    BP Location:       Patient Position:       Pulse:       Resp:       Temp:       TempSrc:       SpO2: 97% 97% 98% 98%   Weight:       Height:         Medications   sodium chloride 0.9 % flush 10 mL (not administered)   sodium chloride 0.9 % flush 10 mL (not administered)   sodium chloride 0.9 % bolus 1,000 mL (1,000 mL Intravenous New Bag 4/11/19 0430)   sodium chloride 0.9 % bolus 1,000 mL (0 mL Intravenous Stopped 4/11/19 0253)   ketorolac (TORADOL) injection 15 mg (15 mg Intravenous Given 4/11/19 0154)   ondansetron (ZOFRAN) injection 4 mg (4 mg Intravenous Given 4/11/19 0154)   HYDROmorphone (DILAUDID) injection 1 mg (1 mg Intravenous Given 4/11/19 0431)     ECG/EMG Results (last 24 hours)     ** No results found for the last 24 hours. **        No orders to display                 MDM      Final diagnoses:   Ureteral calculus, left   AYAKA (acute kidney injury) (CMS/[facility])            Char Moreno, APRN  04/11/19 6027

## 2019-04-12 ENCOUNTER — ANESTHESIA (OUTPATIENT)
Dept: PERIOP | Facility: HOSPITAL | Age: 44
End: 2019-04-12

## 2019-04-12 ENCOUNTER — HOSPITAL ENCOUNTER (INPATIENT)
Facility: HOSPITAL | Age: 44
LOS: 1 days | Discharge: HOME | End: 2019-04-13
Attending: EMERGENCY MEDICINE | Admitting: INTERNAL MEDICINE

## 2019-04-12 ENCOUNTER — ANESTHESIA EVENT (OUTPATIENT)
Dept: PERIOP | Facility: HOSPITAL | Age: 44
End: 2019-04-12

## 2019-04-12 ENCOUNTER — APPOINTMENT (OUTPATIENT)
Dept: GENERAL RADIOLOGY | Facility: HOSPITAL | Age: 44
End: 2019-04-12

## 2019-04-12 DIAGNOSIS — N20.0 KIDNEY STONE ON LEFT SIDE: ICD-10-CM

## 2019-04-12 DIAGNOSIS — R52 INTRACTABLE PAIN: Primary | ICD-10-CM

## 2019-04-12 DIAGNOSIS — R52 PAIN: ICD-10-CM

## 2019-04-12 LAB
ALBUMIN SERPL-MCNC: 3.9 G/DL (ref 3.5–5.2)
ALBUMIN SERPL-MCNC: 4.1 G/DL (ref 3.5–5.2)
ALBUMIN/GLOB SERPL: 1.3 G/DL
ALBUMIN/GLOB SERPL: 1.3 G/DL
ALP SERPL-CCNC: 40 U/L (ref 39–117)
ALP SERPL-CCNC: 47 U/L (ref 39–117)
ALT SERPL W P-5'-P-CCNC: 21 U/L (ref 1–41)
ALT SERPL W P-5'-P-CCNC: 24 U/L (ref 1–41)
ANION GAP SERPL CALCULATED.3IONS-SCNC: 10 MMOL/L
ANION GAP SERPL CALCULATED.3IONS-SCNC: 11 MMOL/L
AST SERPL-CCNC: 18 U/L (ref 1–40)
AST SERPL-CCNC: 23 U/L (ref 1–40)
BASOPHILS # BLD AUTO: 0.02 10*3/MM3 (ref 0–0.2)
BASOPHILS NFR BLD AUTO: 0.2 % (ref 0–1.5)
BILIRUB SERPL-MCNC: 0.4 MG/DL (ref 0.2–1.2)
BILIRUB SERPL-MCNC: 0.8 MG/DL (ref 0.2–1.2)
BILIRUB UR QL STRIP: NEGATIVE
BUN BLD-MCNC: 14 MG/DL (ref 6–20)
BUN BLD-MCNC: 16 MG/DL (ref 6–20)
BUN/CREAT SERPL: 10.9 (ref 7–25)
BUN/CREAT SERPL: 12.6 (ref 7–25)
CALCIUM SPEC-SCNC: 8.7 MG/DL (ref 8.6–10.5)
CALCIUM SPEC-SCNC: 9 MG/DL (ref 8.6–10.5)
CHLORIDE SERPL-SCNC: 101 MMOL/L (ref 98–107)
CHLORIDE SERPL-SCNC: 101 MMOL/L (ref 98–107)
CLARITY UR: CLEAR
CO2 SERPL-SCNC: 23 MMOL/L (ref 22–29)
CO2 SERPL-SCNC: 24 MMOL/L (ref 22–29)
COLOR UR: YELLOW
CREAT BLD-MCNC: 1.27 MG/DL (ref 0.76–1.27)
CREAT BLD-MCNC: 1.29 MG/DL (ref 0.76–1.27)
DEPRECATED RDW RBC AUTO: 41.2 FL (ref 37–54)
EOSINOPHIL # BLD AUTO: 0.26 10*3/MM3 (ref 0–0.4)
EOSINOPHIL NFR BLD AUTO: 2.2 % (ref 0.3–6.2)
ERYTHROCYTE [DISTWIDTH] IN BLOOD BY AUTOMATED COUNT: 12.2 % (ref 12.3–15.4)
GFR SERPL CREATININE-BSD FRML MDRD: 60 ML/MIN/1.73
GFR SERPL CREATININE-BSD FRML MDRD: 61 ML/MIN/1.73
GLOBULIN UR ELPH-MCNC: 2.9 GM/DL
GLOBULIN UR ELPH-MCNC: 3.2 GM/DL
GLUCOSE BLD-MCNC: 124 MG/DL (ref 65–99)
GLUCOSE BLD-MCNC: 97 MG/DL (ref 65–99)
GLUCOSE UR STRIP-MCNC: NEGATIVE MG/DL
HCT VFR BLD AUTO: 40.4 % (ref 37.5–51)
HGB BLD-MCNC: 13.7 G/DL (ref 13–17.7)
HGB UR QL STRIP.AUTO: NEGATIVE
IMM GRANULOCYTES # BLD AUTO: 0.02 10*3/MM3 (ref 0–0.05)
IMM GRANULOCYTES NFR BLD AUTO: 0.2 % (ref 0–0.5)
INR PPP: 1.07 (ref 0.85–1.16)
KETONES UR QL STRIP: NEGATIVE
LEUKOCYTE ESTERASE UR QL STRIP.AUTO: NEGATIVE
LYMPHOCYTES # BLD AUTO: 2.04 10*3/MM3 (ref 0.7–3.1)
LYMPHOCYTES NFR BLD AUTO: 17.3 % (ref 19.6–45.3)
MCH RBC QN AUTO: 31.2 PG (ref 26.6–33)
MCHC RBC AUTO-ENTMCNC: 33.9 G/DL (ref 31.5–35.7)
MCV RBC AUTO: 92 FL (ref 79–97)
MONOCYTES # BLD AUTO: 1.01 10*3/MM3 (ref 0.1–0.9)
MONOCYTES NFR BLD AUTO: 8.6 % (ref 5–12)
NEUTROPHILS # BLD AUTO: 8.43 10*3/MM3 (ref 1.4–7)
NEUTROPHILS NFR BLD AUTO: 71.7 % (ref 42.7–76)
NITRITE UR QL STRIP: NEGATIVE
PH UR STRIP.AUTO: 7 [PH] (ref 5–8)
PLATELET # BLD AUTO: 227 10*3/MM3 (ref 140–450)
PMV BLD AUTO: 11.7 FL (ref 6–12)
POTASSIUM BLD-SCNC: 3.9 MMOL/L (ref 3.5–5.2)
POTASSIUM BLD-SCNC: 3.9 MMOL/L (ref 3.5–5.2)
POTASSIUM BLD-SCNC: 4.2 MMOL/L (ref 3.5–5.2)
PROT SERPL-MCNC: 6.8 G/DL (ref 6–8.5)
PROT SERPL-MCNC: 7.3 G/DL (ref 6–8.5)
PROT UR QL STRIP: NEGATIVE
PROTHROMBIN TIME: 13.4 SECONDS (ref 11.2–14.3)
RBC # BLD AUTO: 4.39 10*6/MM3 (ref 4.14–5.8)
SODIUM BLD-SCNC: 134 MMOL/L (ref 136–145)
SODIUM BLD-SCNC: 136 MMOL/L (ref 136–145)
SP GR UR STRIP: 1.01 (ref 1–1.03)
UROBILINOGEN UR QL STRIP: NORMAL
WBC NRBC COR # BLD: 11.76 10*3/MM3 (ref 3.4–10.8)

## 2019-04-12 PROCEDURE — C1769 GUIDE WIRE: HCPCS | Performed by: UROLOGY

## 2019-04-12 PROCEDURE — C1726 CATH, BAL DIL, NON-VASCULAR: HCPCS | Performed by: UROLOGY

## 2019-04-12 PROCEDURE — 99284 EMERGENCY DEPT VISIT MOD MDM: CPT

## 2019-04-12 PROCEDURE — 82360 CALCULUS ASSAY QUANT: CPT | Performed by: UROLOGY

## 2019-04-12 PROCEDURE — 85610 PROTHROMBIN TIME: CPT | Performed by: UROLOGY

## 2019-04-12 PROCEDURE — 93005 ELECTROCARDIOGRAM TRACING: CPT | Performed by: ANESTHESIOLOGY

## 2019-04-12 PROCEDURE — 76000 FLUOROSCOPY <1 HR PHYS/QHP: CPT

## 2019-04-12 PROCEDURE — 25010000002 ONDANSETRON PER 1 MG: Performed by: EMERGENCY MEDICINE

## 2019-04-12 PROCEDURE — C2617 STENT, NON-COR, TEM W/O DEL: HCPCS | Performed by: UROLOGY

## 2019-04-12 PROCEDURE — 99223 1ST HOSP IP/OBS HIGH 75: CPT | Performed by: INTERNAL MEDICINE

## 2019-04-12 PROCEDURE — 25010000002 FENTANYL CITRATE (PF) 100 MCG/2ML SOLUTION: Performed by: ANESTHESIOLOGY

## 2019-04-12 PROCEDURE — 25010000002 MORPHINE PER 10 MG: Performed by: INTERNAL MEDICINE

## 2019-04-12 PROCEDURE — 25010000002 DEXAMETHASONE PER 1 MG: Performed by: ANESTHESIOLOGY

## 2019-04-12 PROCEDURE — 81003 URINALYSIS AUTO W/O SCOPE: CPT | Performed by: INTERNAL MEDICINE

## 2019-04-12 PROCEDURE — 85025 COMPLETE CBC W/AUTO DIFF WBC: CPT | Performed by: EMERGENCY MEDICINE

## 2019-04-12 PROCEDURE — 80053 COMPREHEN METABOLIC PANEL: CPT | Performed by: EMERGENCY MEDICINE

## 2019-04-12 PROCEDURE — 80053 COMPREHEN METABOLIC PANEL: CPT | Performed by: UROLOGY

## 2019-04-12 PROCEDURE — 0T778DZ DILATION OF LEFT URETER WITH INTRALUMINAL DEVICE, VIA NATURAL OR ARTIFICIAL OPENING ENDOSCOPIC: ICD-10-PCS | Performed by: UROLOGY

## 2019-04-12 PROCEDURE — 25010000002 KETOROLAC TROMETHAMINE PER 15 MG: Performed by: UROLOGY

## 2019-04-12 PROCEDURE — 84132 ASSAY OF SERUM POTASSIUM: CPT | Performed by: ANESTHESIOLOGY

## 2019-04-12 PROCEDURE — 25010000002 CEFOXITIN PER 1 G: Performed by: UROLOGY

## 2019-04-12 PROCEDURE — 25010000002 KETOROLAC TROMETHAMINE PER 15 MG: Performed by: EMERGENCY MEDICINE

## 2019-04-12 PROCEDURE — 0TC78ZZ EXTIRPATION OF MATTER FROM LEFT URETER, VIA NATURAL OR ARTIFICIAL OPENING ENDOSCOPIC: ICD-10-PCS | Performed by: UROLOGY

## 2019-04-12 PROCEDURE — 25010000002 PROPOFOL 10 MG/ML EMULSION: Performed by: ANESTHESIOLOGY

## 2019-04-12 DEVICE — URETERAL STENT
Type: IMPLANTABLE DEVICE | Status: FUNCTIONAL
Brand: PERCUFLEX™ PLUS

## 2019-04-12 RX ORDER — FENTANYL CITRATE 50 UG/ML
INJECTION, SOLUTION INTRAMUSCULAR; INTRAVENOUS AS NEEDED
Status: DISCONTINUED | OUTPATIENT
Start: 2019-04-12 | End: 2019-04-12 | Stop reason: SURG

## 2019-04-12 RX ORDER — FENTANYL CITRATE 50 UG/ML
50 INJECTION, SOLUTION INTRAMUSCULAR; INTRAVENOUS
Status: DISCONTINUED | OUTPATIENT
Start: 2019-04-12 | End: 2019-04-12 | Stop reason: HOSPADM

## 2019-04-12 RX ORDER — PROMETHAZINE HYDROCHLORIDE 25 MG/ML
6.25 INJECTION, SOLUTION INTRAMUSCULAR; INTRAVENOUS ONCE AS NEEDED
Status: DISCONTINUED | OUTPATIENT
Start: 2019-04-12 | End: 2019-04-12 | Stop reason: HOSPADM

## 2019-04-12 RX ORDER — ASPIRIN 81 MG/1
81 TABLET ORAL DAILY
Status: DISCONTINUED | OUTPATIENT
Start: 2019-04-13 | End: 2019-04-13 | Stop reason: HOSPADM

## 2019-04-12 RX ORDER — ACETAMINOPHEN 325 MG/1
650 TABLET ORAL EVERY 4 HOURS PRN
Status: DISCONTINUED | OUTPATIENT
Start: 2019-04-12 | End: 2019-04-13 | Stop reason: HOSPADM

## 2019-04-12 RX ORDER — CALCIUM CARBONATE 200(500)MG
2 TABLET,CHEWABLE ORAL 2 TIMES DAILY PRN
Status: DISCONTINUED | OUTPATIENT
Start: 2019-04-12 | End: 2019-04-13 | Stop reason: HOSPADM

## 2019-04-12 RX ORDER — SODIUM CHLORIDE 0.9 % (FLUSH) 0.9 %
10 SYRINGE (ML) INJECTION AS NEEDED
Status: DISCONTINUED | OUTPATIENT
Start: 2019-04-12 | End: 2019-04-13 | Stop reason: HOSPADM

## 2019-04-12 RX ORDER — ACETAMINOPHEN 325 MG/1
650 TABLET ORAL EVERY 4 HOURS PRN
Status: DISCONTINUED | OUTPATIENT
Start: 2019-04-12 | End: 2019-04-12

## 2019-04-12 RX ORDER — SODIUM CHLORIDE 0.9 % (FLUSH) 0.9 %
3-10 SYRINGE (ML) INJECTION AS NEEDED
Status: DISCONTINUED | OUTPATIENT
Start: 2019-04-12 | End: 2019-04-12 | Stop reason: HOSPADM

## 2019-04-12 RX ORDER — SODIUM CHLORIDE 0.9 % (FLUSH) 0.9 %
1-10 SYRINGE (ML) INJECTION AS NEEDED
Status: DISCONTINUED | OUTPATIENT
Start: 2019-04-12 | End: 2019-04-13 | Stop reason: HOSPADM

## 2019-04-12 RX ORDER — MORPHINE SULFATE 2 MG/ML
1 INJECTION, SOLUTION INTRAMUSCULAR; INTRAVENOUS
Status: DISCONTINUED | OUTPATIENT
Start: 2019-04-12 | End: 2019-04-13 | Stop reason: HOSPADM

## 2019-04-12 RX ORDER — PROMETHAZINE HYDROCHLORIDE 25 MG/1
25 SUPPOSITORY RECTAL ONCE AS NEEDED
Status: DISCONTINUED | OUTPATIENT
Start: 2019-04-12 | End: 2019-04-12 | Stop reason: HOSPADM

## 2019-04-12 RX ORDER — OXYCODONE HYDROCHLORIDE AND ACETAMINOPHEN 5; 325 MG/1; MG/1
1 TABLET ORAL ONCE
Status: DISCONTINUED | OUTPATIENT
Start: 2019-04-12 | End: 2019-04-13 | Stop reason: HOSPADM

## 2019-04-12 RX ORDER — SODIUM CHLORIDE 0.9 % (FLUSH) 0.9 %
3 SYRINGE (ML) INJECTION EVERY 12 HOURS SCHEDULED
Status: DISCONTINUED | OUTPATIENT
Start: 2019-04-12 | End: 2019-04-13 | Stop reason: HOSPADM

## 2019-04-12 RX ORDER — OXYCODONE HYDROCHLORIDE AND ACETAMINOPHEN 5; 325 MG/1; MG/1
1 TABLET ORAL EVERY 4 HOURS PRN
Status: DISCONTINUED | OUTPATIENT
Start: 2019-04-12 | End: 2019-04-12

## 2019-04-12 RX ORDER — SENNA AND DOCUSATE SODIUM 50; 8.6 MG/1; MG/1
2 TABLET, FILM COATED ORAL 2 TIMES DAILY PRN
Status: DISCONTINUED | OUTPATIENT
Start: 2019-04-12 | End: 2019-04-12

## 2019-04-12 RX ORDER — CALCIUM CARBONATE 200(500)MG
2 TABLET,CHEWABLE ORAL 2 TIMES DAILY PRN
Status: DISCONTINUED | OUTPATIENT
Start: 2019-04-12 | End: 2019-04-12

## 2019-04-12 RX ORDER — KETOROLAC TROMETHAMINE 15 MG/ML
15 INJECTION, SOLUTION INTRAMUSCULAR; INTRAVENOUS ONCE
Status: COMPLETED | OUTPATIENT
Start: 2019-04-12 | End: 2019-04-12

## 2019-04-12 RX ORDER — ONDANSETRON 2 MG/ML
4 INJECTION INTRAMUSCULAR; INTRAVENOUS EVERY 6 HOURS PRN
Status: DISCONTINUED | OUTPATIENT
Start: 2019-04-12 | End: 2019-04-13 | Stop reason: HOSPADM

## 2019-04-12 RX ORDER — ONDANSETRON 2 MG/ML
4 INJECTION INTRAMUSCULAR; INTRAVENOUS ONCE
Status: COMPLETED | OUTPATIENT
Start: 2019-04-12 | End: 2019-04-12

## 2019-04-12 RX ORDER — SODIUM CHLORIDE 0.9 % (FLUSH) 0.9 %
3 SYRINGE (ML) INJECTION EVERY 12 HOURS SCHEDULED
Status: DISCONTINUED | OUTPATIENT
Start: 2019-04-12 | End: 2019-04-12

## 2019-04-12 RX ORDER — SENNA AND DOCUSATE SODIUM 50; 8.6 MG/1; MG/1
2 TABLET, FILM COATED ORAL 2 TIMES DAILY PRN
Status: DISCONTINUED | OUTPATIENT
Start: 2019-04-12 | End: 2019-04-13 | Stop reason: HOSPADM

## 2019-04-12 RX ORDER — SODIUM CHLORIDE 0.9 % (FLUSH) 0.9 %
10 SYRINGE (ML) INJECTION AS NEEDED
Status: DISCONTINUED | OUTPATIENT
Start: 2019-04-12 | End: 2019-04-12 | Stop reason: SDUPTHER

## 2019-04-12 RX ORDER — KETOROLAC TROMETHAMINE 30 MG/ML
15 INJECTION, SOLUTION INTRAMUSCULAR; INTRAVENOUS ONCE
Status: COMPLETED | OUTPATIENT
Start: 2019-04-12 | End: 2019-04-12

## 2019-04-12 RX ORDER — HYDROMORPHONE HYDROCHLORIDE 1 MG/ML
0.5 INJECTION, SOLUTION INTRAMUSCULAR; INTRAVENOUS; SUBCUTANEOUS
Status: DISCONTINUED | OUTPATIENT
Start: 2019-04-12 | End: 2019-04-12

## 2019-04-12 RX ORDER — SODIUM CHLORIDE 9 MG/ML
100 INJECTION, SOLUTION INTRAVENOUS CONTINUOUS
Status: DISCONTINUED | OUTPATIENT
Start: 2019-04-12 | End: 2019-04-13 | Stop reason: HOSPADM

## 2019-04-12 RX ORDER — MAGNESIUM HYDROXIDE 1200 MG/15ML
LIQUID ORAL AS NEEDED
Status: DISCONTINUED | OUTPATIENT
Start: 2019-04-12 | End: 2019-04-12 | Stop reason: HOSPADM

## 2019-04-12 RX ORDER — SODIUM CHLORIDE 0.9 % (FLUSH) 0.9 %
10 SYRINGE (ML) INJECTION AS NEEDED
Status: DISCONTINUED | OUTPATIENT
Start: 2019-04-12 | End: 2019-04-12

## 2019-04-12 RX ORDER — PROPOFOL 10 MG/ML
VIAL (ML) INTRAVENOUS AS NEEDED
Status: DISCONTINUED | OUTPATIENT
Start: 2019-04-12 | End: 2019-04-12 | Stop reason: SURG

## 2019-04-12 RX ORDER — ONDANSETRON 2 MG/ML
4 INJECTION INTRAMUSCULAR; INTRAVENOUS ONCE AS NEEDED
Status: DISCONTINUED | OUTPATIENT
Start: 2019-04-12 | End: 2019-04-12 | Stop reason: HOSPADM

## 2019-04-12 RX ORDER — SODIUM CHLORIDE 0.9 % (FLUSH) 0.9 %
1-10 SYRINGE (ML) INJECTION AS NEEDED
Status: DISCONTINUED | OUTPATIENT
Start: 2019-04-12 | End: 2019-04-12

## 2019-04-12 RX ORDER — PROMETHAZINE HYDROCHLORIDE 25 MG/1
25 TABLET ORAL ONCE AS NEEDED
Status: DISCONTINUED | OUTPATIENT
Start: 2019-04-12 | End: 2019-04-12 | Stop reason: HOSPADM

## 2019-04-12 RX ORDER — AMLODIPINE BESYLATE 10 MG/1
10 TABLET ORAL DAILY
Status: DISCONTINUED | OUTPATIENT
Start: 2019-04-12 | End: 2019-04-12

## 2019-04-12 RX ORDER — LIDOCAINE HYDROCHLORIDE 10 MG/ML
0.5 INJECTION, SOLUTION EPIDURAL; INFILTRATION; INTRACAUDAL; PERINEURAL ONCE AS NEEDED
Status: DISCONTINUED | OUTPATIENT
Start: 2019-04-12 | End: 2019-04-12 | Stop reason: HOSPADM

## 2019-04-12 RX ORDER — MEPERIDINE HYDROCHLORIDE 25 MG/ML
12.5 INJECTION INTRAMUSCULAR; INTRAVENOUS; SUBCUTANEOUS
Status: DISCONTINUED | OUTPATIENT
Start: 2019-04-12 | End: 2019-04-12 | Stop reason: HOSPADM

## 2019-04-12 RX ORDER — ASPIRIN 81 MG/1
81 TABLET ORAL DAILY
Status: DISCONTINUED | OUTPATIENT
Start: 2019-04-12 | End: 2019-04-12

## 2019-04-12 RX ORDER — LIDOCAINE HYDROCHLORIDE 20 MG/ML
INJECTION, SOLUTION INFILTRATION; PERINEURAL AS NEEDED
Status: DISCONTINUED | OUTPATIENT
Start: 2019-04-12 | End: 2019-04-12 | Stop reason: SURG

## 2019-04-12 RX ORDER — AMLODIPINE BESYLATE 10 MG/1
10 TABLET ORAL NIGHTLY
Status: DISCONTINUED | OUTPATIENT
Start: 2019-04-12 | End: 2019-04-13 | Stop reason: HOSPADM

## 2019-04-12 RX ORDER — NITROFURANTOIN 25; 75 MG/1; MG/1
100 CAPSULE ORAL EVERY 12 HOURS SCHEDULED
Status: DISCONTINUED | OUTPATIENT
Start: 2019-04-12 | End: 2019-04-13 | Stop reason: HOSPADM

## 2019-04-12 RX ORDER — FAMOTIDINE 10 MG/ML
20 INJECTION, SOLUTION INTRAVENOUS ONCE
Status: DISCONTINUED | OUTPATIENT
Start: 2019-04-12 | End: 2019-04-12 | Stop reason: HOSPADM

## 2019-04-12 RX ORDER — NITROFURANTOIN 25; 75 MG/1; MG/1
100 CAPSULE ORAL EVERY 12 HOURS SCHEDULED
Status: DISCONTINUED | OUTPATIENT
Start: 2019-04-12 | End: 2019-04-12

## 2019-04-12 RX ORDER — OXYCODONE HYDROCHLORIDE AND ACETAMINOPHEN 5; 325 MG/1; MG/1
1 TABLET ORAL EVERY 8 HOURS PRN
Status: DISCONTINUED | OUTPATIENT
Start: 2019-04-12 | End: 2019-04-12

## 2019-04-12 RX ORDER — FAMOTIDINE 20 MG/1
20 TABLET, FILM COATED ORAL ONCE
Status: DISCONTINUED | OUTPATIENT
Start: 2019-04-12 | End: 2019-04-12 | Stop reason: HOSPADM

## 2019-04-12 RX ORDER — FAMOTIDINE 20 MG/1
20 TABLET, FILM COATED ORAL ONCE
Status: COMPLETED | OUTPATIENT
Start: 2019-04-12 | End: 2019-04-12

## 2019-04-12 RX ORDER — FLECAINIDE ACETATE 50 MG/1
50 TABLET ORAL 2 TIMES DAILY
Status: DISCONTINUED | OUTPATIENT
Start: 2019-04-12 | End: 2019-04-13 | Stop reason: HOSPADM

## 2019-04-12 RX ORDER — ONDANSETRON 2 MG/ML
4 INJECTION INTRAMUSCULAR; INTRAVENOUS ONCE
Status: DISCONTINUED | OUTPATIENT
Start: 2019-04-12 | End: 2019-04-13 | Stop reason: HOSPADM

## 2019-04-12 RX ORDER — MORPHINE SULFATE 2 MG/ML
1 INJECTION, SOLUTION INTRAMUSCULAR; INTRAVENOUS
Status: DISCONTINUED | OUTPATIENT
Start: 2019-04-12 | End: 2019-04-12

## 2019-04-12 RX ORDER — DEXAMETHASONE SODIUM PHOSPHATE 10 MG/ML
INJECTION INTRAMUSCULAR; INTRAVENOUS AS NEEDED
Status: DISCONTINUED | OUTPATIENT
Start: 2019-04-12 | End: 2019-04-12 | Stop reason: SURG

## 2019-04-12 RX ORDER — SODIUM CHLORIDE 0.9 % (FLUSH) 0.9 %
3 SYRINGE (ML) INJECTION EVERY 12 HOURS SCHEDULED
Status: DISCONTINUED | OUTPATIENT
Start: 2019-04-12 | End: 2019-04-12 | Stop reason: HOSPADM

## 2019-04-12 RX ORDER — NALOXONE HCL 0.4 MG/ML
0.4 VIAL (ML) INJECTION
Status: DISCONTINUED | OUTPATIENT
Start: 2019-04-12 | End: 2019-04-12

## 2019-04-12 RX ORDER — OXYCODONE HYDROCHLORIDE AND ACETAMINOPHEN 5; 325 MG/1; MG/1
1 TABLET ORAL EVERY 4 HOURS PRN
Status: DISCONTINUED | OUTPATIENT
Start: 2019-04-12 | End: 2019-04-13 | Stop reason: HOSPADM

## 2019-04-12 RX ORDER — OXYCODONE HYDROCHLORIDE AND ACETAMINOPHEN 5; 325 MG/1; MG/1
1 TABLET ORAL ONCE
Status: COMPLETED | OUTPATIENT
Start: 2019-04-12 | End: 2019-04-12

## 2019-04-12 RX ORDER — ONDANSETRON 4 MG/1
4 TABLET, FILM COATED ORAL EVERY 6 HOURS PRN
Status: DISCONTINUED | OUTPATIENT
Start: 2019-04-12 | End: 2019-04-13 | Stop reason: HOSPADM

## 2019-04-12 RX ORDER — SODIUM CHLORIDE, SODIUM LACTATE, POTASSIUM CHLORIDE, CALCIUM CHLORIDE 600; 310; 30; 20 MG/100ML; MG/100ML; MG/100ML; MG/100ML
9 INJECTION, SOLUTION INTRAVENOUS CONTINUOUS
Status: DISCONTINUED | OUTPATIENT
Start: 2019-04-12 | End: 2019-04-12

## 2019-04-12 RX ORDER — METOPROLOL TARTRATE 50 MG/1
50 TABLET, FILM COATED ORAL 2 TIMES DAILY
Status: DISCONTINUED | OUTPATIENT
Start: 2019-04-12 | End: 2019-04-13 | Stop reason: HOSPADM

## 2019-04-12 RX ORDER — TAMSULOSIN HYDROCHLORIDE 0.4 MG/1
0.4 CAPSULE ORAL NIGHTLY
Status: DISCONTINUED | OUTPATIENT
Start: 2019-04-12 | End: 2019-04-12

## 2019-04-12 RX ADMIN — SODIUM CHLORIDE 1000 ML: 9 INJECTION, SOLUTION INTRAVENOUS at 05:56

## 2019-04-12 RX ADMIN — DEXAMETHASONE SODIUM PHOSPHATE 8 MG: 10 INJECTION INTRAMUSCULAR; INTRAVENOUS at 18:09

## 2019-04-12 RX ADMIN — ONDANSETRON 4 MG: 2 INJECTION INTRAMUSCULAR; INTRAVENOUS at 04:30

## 2019-04-12 RX ADMIN — NITROFURANTOIN (MONOHYDRATE/MACROCRYSTALS) 100 MG: 75; 25 CAPSULE ORAL at 10:49

## 2019-04-12 RX ADMIN — FENTANYL CITRATE 50 MCG: 50 INJECTION, SOLUTION INTRAMUSCULAR; INTRAVENOUS at 18:09

## 2019-04-12 RX ADMIN — FENTANYL CITRATE 50 MCG: 50 INJECTION, SOLUTION INTRAMUSCULAR; INTRAVENOUS at 18:55

## 2019-04-12 RX ADMIN — LIDOCAINE HYDROCHLORIDE 100 MG: 20 INJECTION, SOLUTION INFILTRATION; PERINEURAL at 18:09

## 2019-04-12 RX ADMIN — KETOROLAC TROMETHAMINE 15 MG: 30 INJECTION, SOLUTION INTRAMUSCULAR at 21:12

## 2019-04-12 RX ADMIN — SODIUM CHLORIDE 1000 ML: 9 INJECTION, SOLUTION INTRAVENOUS at 04:29

## 2019-04-12 RX ADMIN — METOPROLOL TARTRATE 50 MG: 50 TABLET ORAL at 21:11

## 2019-04-12 RX ADMIN — SODIUM CHLORIDE, POTASSIUM CHLORIDE, SODIUM LACTATE AND CALCIUM CHLORIDE: 600; 310; 30; 20 INJECTION, SOLUTION INTRAVENOUS at 18:09

## 2019-04-12 RX ADMIN — FLECAINIDE ACETATE 50 MG: 50 TABLET ORAL at 10:17

## 2019-04-12 RX ADMIN — ASPIRIN 81 MG: 81 TABLET, COATED ORAL at 10:17

## 2019-04-12 RX ADMIN — SODIUM CHLORIDE, POTASSIUM CHLORIDE, SODIUM LACTATE AND CALCIUM CHLORIDE 9 ML/HR: 600; 310; 30; 20 INJECTION, SOLUTION INTRAVENOUS at 17:19

## 2019-04-12 RX ADMIN — SODIUM CHLORIDE 100 ML/HR: 9 INJECTION, SOLUTION INTRAVENOUS at 10:05

## 2019-04-12 RX ADMIN — MORPHINE SULFATE 1 MG: 2 INJECTION, SOLUTION INTRAMUSCULAR; INTRAVENOUS at 14:43

## 2019-04-12 RX ADMIN — KETOROLAC TROMETHAMINE 15 MG: 15 INJECTION, SOLUTION INTRAMUSCULAR; INTRAVENOUS at 04:30

## 2019-04-12 RX ADMIN — METOPROLOL TARTRATE 50 MG: 50 TABLET ORAL at 10:17

## 2019-04-12 RX ADMIN — FLECAINIDE ACETATE 50 MG: 50 TABLET ORAL at 21:11

## 2019-04-12 RX ADMIN — FAMOTIDINE 20 MG: 20 TABLET, FILM COATED ORAL at 17:18

## 2019-04-12 RX ADMIN — FENTANYL CITRATE 50 MCG: 50 INJECTION, SOLUTION INTRAMUSCULAR; INTRAVENOUS at 19:19

## 2019-04-12 RX ADMIN — OXYCODONE HYDROCHLORIDE AND ACETAMINOPHEN 1 TABLET: 5; 325 TABLET ORAL at 10:17

## 2019-04-12 RX ADMIN — NITROFURANTOIN (MONOHYDRATE/MACROCRYSTALS) 100 MG: 75; 25 CAPSULE ORAL at 21:48

## 2019-04-12 RX ADMIN — PROPOFOL 200 MG: 10 INJECTION, EMULSION INTRAVENOUS at 18:09

## 2019-04-12 RX ADMIN — OXYCODONE AND ACETAMINOPHEN 1 TABLET: 5; 325 TABLET ORAL at 16:05

## 2019-04-12 RX ADMIN — AMLODIPINE BESYLATE 10 MG: 10 TABLET ORAL at 21:11

## 2019-04-12 RX ADMIN — CEFOXITIN SODIUM 2 G: 1 POWDER, FOR SOLUTION INTRAVENOUS at 18:15

## 2019-04-12 NOTE — CONSULTS
Consults    Patient Care Team:  Heather Ibarra MD as PCP - General (Family Medicine)    Chief complain tleft ureteral stone    Subjective     History of Present Illness patient is typically followed by Dr. Ludin Olivas and saw him last week with 3 weeks of intermittent left flank pain.  He had a CT scan in [place] which demonstrated a left ureteral stone.  He was seen here 48 hours ago and had a millimeter distal left ureteral stone.  He was sent home on oral pain medication.  His pain became refractory and he represented to the emergency room with intractable pain on oral medicine.  He was admitted for pain control.  He has had no fever or chills.  He is still having significant pain even on IV pain medication.  The stone now is at his left ureterovesical junction.    Review of Systems     Past Medical History:   Diagnosis Date   • Excessive caffeine intake    • Hypertension    • Kidney calculi    • PAC (premature atrial contraction)    • PAF (paroxysmal atrial fibrillation) (CMS/HCC)     CHADS-VASc = 1   • PVC (premature ventricular contraction)        Objective      Vital Signs  Temp:  [97.7 °F (36.5 °C)-98.6 °F (37 °C)] 97.7 °F (36.5 °C)  Heart Rate:  [70-75] 70  Resp:  [15-18] 18  BP: (126-137)/() 127/90    Physical Exam  Patient is alert.  Family is at bedside.  He has significant left CVA tenderness to palpation.    His current CT scan was reviewed and is as above.  Results Review:    I reviewed the patient's new clinical results.  I reviewed the patient's new imaging results and agree with the interpretation.        Assessment/Plan       A-fib (CMS/HCC)    Hyperlipemia    HTN (hypertension)    PVC's (premature ventricular contractions)    Intractable pain    Nephrolithiasis      Assessment & Plan  Left distal ureteral stone with intractable pain.  We discussed proceeding with ureteroscopy versus observation.  Due to his level of discomfort I suggest we proceed with intervention and he is in  agreement.  Risks were discussed including retained stone fragments requiring other intervention.  We also discussed the risk of ureteral perforation.  We discussed likely perioperative ureteral stent placement.  He has had previous stones.  I discussed the patients findings and my recommendations with patient and family    Johnson White MD  04/12/19  4:25 PM    Time:

## 2019-04-12 NOTE — OP NOTE
CYSTOSCOPY URETERAL CATHETER/STENT INSERTION  Procedure Report    Patient Name:  Mikie Meléndez  YOB: 1975    Date of Surgery:  4/12/2019     Indications: Left distal ureteral calculus    Pre-op Diagnosis:   Left distal ureteral calculus       Post-Op Diagnosis Code same    Procedure/CPT® Codes:      Procedure(s):  LEFT UTEROSCOPY, LEFT URETERAL BALLOON DIALATION, LEFT URETERAL STONE BASKET EXTRACTION, PLACEMENT OF LEFT URETERAL STENT 4.8    Staff:  Surgeon(s):  Johnson White MD         Anesthesia: Choice    Estimated Blood Loss: None    Implants:    Implant Name Type Inv. Item Serial No.  Lot No. LRB No. Used   STENT PERCUFLX NO GW [id number] - [id number] Implant STENT PERCUFLX NO GW 4.8X28  [employer] [id number] Left 1       Specimen:          Ureteral calculus        Findings: High-grade obstruction left ureterovesical junction    Complications: None    Description of Procedure: Patient has history of urolithiasis.  He last 3 weeks has had intermittent pain.  He saw Dr. Ludin Olivas last week.  CT scan which demonstrated a stone distal left ureter.  They elected to try to pass this spontaneously.  2 days ago he presented to the emergency room here due to refractory pain.  Repeat CT scan showed a stone in his distal left ureter.  His pain subsided and he was discharged home.  He returned earlier today with severe left flank pain which was refractory.  He was admitted for pain control.  Stone looks to be 6 mm in size.  We discussed intervention versus continued observation.  He would like to proceed with ureteroscopic stone extraction.  We discussed the possibility of laser lithotripsy as well.  We discussed likely ureteral stent placement perioperatively.    After satisfactory general anesthesia he was carefully placed in the lithotomy position.  Sequential compression garments are in place and functioning at time of induction.  The genital area was prepped and draped in  standard manner.  A 21 Malay cystoscopy sheath was introduced under direct vision to 30 degree lens.  Urethra no large prostate mildly obstructing.  The bladder was inspected.  The left ureteral orifice seemed snug but without edema.  I passed a 0.035 zip wire into the ureteral orifice.  The stone could be seen fluoroscopically.  The wire was advanced around the stone and advanced easily to the level of the kidney.  The ureteral orifice did not look amenable to primary ureteroscopy and therefore a 15 Malay 4 cm length balloon dilating system was used to light the transmural ureter.  This was left inflated for moment deflated and removed.  Cystoscope was withdrawn and the wire secured to the drape.  The semirigid ureteroscope was then introduced and easily advanced up to the stone.  The stone was quite lobulated but looked amenable to skin extraction.  It was engaged in a 1.90 tip nitinol basket and extracted atraumatically.  The ureteroscope was reintroduced.  There is no evidence of ureteral perforation.  No evidence of retained stone fragments.  The stent was then placed over the wire primarily.  There is a good coil in the kidney and a cold in the bladder.  String was left attached next to the urethra.  Bladder was drained with the cystoscopy sheath.  The string was secured to the penis with Tegaderm.  He is awake  And transferred to postop recovery room in stable condition.  He will be observed overnight.  We will anticipate discharge tomorrow morning.      Johnson White MD     Date: 4/12/2019  Time: 6:48 PM

## 2019-04-12 NOTE — PAYOR COMM NOTE
"Anaid Sampson RN Utilization Review [phone]  Fax # [phone]    Pt admitted as observation and status changed to Inpatient 19.          Dede Brady (44 y.o. Male)     Date of Birth Social Security Number Address Home Phone MRN    1975  [address] [place] [address] [phone] [id number]    Latter-day Marital Status          None        Admission Date Admission Type Admitting Provider Attending Provider Department, Room/Bed    19 Emergency Cydney Juan MD Howard, Gabriela Kirk, MD [facility] 5G, S558/1    Discharge Date Discharge Disposition Discharge Destination                       Attending Provider:  Cydney Juan MD    Allergies:  No Known Allergies    Isolation:  None   Infection:  None   Code Status:  CPR    Ht:  185.4 cm (73\")   Wt:  103 kg (228 lb)    Admission Cmt:  None   Principal Problem:  None                Active Insurance as of 2019     Primary Coverage     Payor Plan Insurance Group Employer/Plan Group    [employer] [facility] [id number]     Payor Plan Address Payor Plan Phone Number Payor Plan Fax Number Effective Dates    PO    3/7/2018 - None Entered    [facility] [id number]       Subscriber Name Subscriber Birth Date Member ID       DEDE BRADY 1975 [id number]                 Emergency Contacts      (Rel.) Home Phone Work Phone Mobile Phone    Rosalee Brady (Spouse) [phone] -- --               History & Physical      Cydney Juan MD at 2019  9:01 AM              [facility] Medicine Services  HISTORY AND PHYSICAL    Patient Name: Dede Brady  : 1975  MRN: [id number]  Primary Care Physician: Heather Ibarra MD  Date of admission: 2019      Subjective   Subjective     Chief Complaint:  Left flank pain    HPI:  Dede Brady is a 44 y.o. male with PMH of HTN, PAC/PVCs on Flecainide, PAF solely on ASA, and " "nephrolithiasis presents with intractable left flank pain due to known left kidney stone. Pt states that he began to notice left flank pain one month ago but that it got worse about one week ago. He went to see his Urologist in [place] who prescribed him pain medications, flomax, and macrobid and did a CT A/P.  He advised him to go to the ED if pain worsened. Pt's pain worsened yesterday am and he came to our ED as his Urologist was out of town.  He was given IVFs and more pain medication and sent home. Pt's pain was not controlled despite opiate pain medication, so he returned last evening.  He is being admitted to our service.     Review of Systems   General- no F/C, no weight loss or gain, no fatigue  HEENT- no blurry vision, no nasal congestion, no hearing loss, no sore throat, no dysphagia, no oral ulcers, no dental caries, no bleeding gums  CVS- no chest pain, no palpitations  Pulm- no SOA, no cough, no orthopnea, no PND  GI- no diarrhea, no constipation, no BRBPR, no nausea, no vomiting  MSK- no joint pain, no swelling, no erythema  - no dysuria, + intermittent hematuria  Neuro- no headaches, no focal motor deficits  Psych- no SI/ HI, no depression/anxiety  Otherwise complete ROS reviewed and is negative except as mentioned in the HPI.    Personal History     Past Medical History:   Diagnosis Date   • Excessive caffeine intake    • Hypertension    • Kidney calculi    • PAC (premature atrial contraction)    • PAF (paroxysmal atrial fibrillation) (CMS/HCC)     CHADS-VASc = 1   • PVC (premature ventricular contraction)        Past Surgical History:   Procedure Laterality Date   • APPENDECTOMY     • BACK SURGERY     • CARDIAC CATHETERIZATION  06/2016    \"NORMAL\" PER DR. KIMANI PHIPPS   • CARDIAC ELECTROPHYSIOLOGY PROCEDURE N/A 9/28/2016    Procedure: PVA. Stop Flecainide 5 days prior to the procedure. Stop Metoprolol 3 days prior to the procedure.;  Surgeon: Kai Mitchell DO;  Location: [facility] INVASIVE " LOCATION;  Service:    • CARDIOVERSION  06/24/2016   • EXTRACORPOREAL SHOCK WAVE LITHOTRIPSY (ESWL)     • HERNIA REPAIR     • LASIK     • NASAL SEPTUM SURGERY         Family History: family history includes Arrhythmia in his sister; No Known Problems in his father and mother; Other in his other. Otherwise pertinent FHx was reviewed and unremarkable.     Social History:  reports that he has never smoked. He has never used smokeless tobacco. He reports that he does not drink alcohol or use drugs.  Social History     Social History Narrative    Patient drinks 1-2 servings of caffeine per day.       Medications:    Available home medication information reviewed.  Medications Prior to Admission   Medication Sig Dispense Refill Last Dose   • amLODIPine (NORVASC) 10 MG tablet Take 1 tablet by mouth Daily. 30 tablet 6 Taking   • aspirin 81 MG EC tablet Take 81 mg by mouth Daily.   Taking   • fenofibrate (TRICOR) 145 MG tablet Take 145 mg by mouth Daily.  2 Taking   • flecainide (TAMBOCOR) 50 MG tablet TAKE 1 TABLET BY MOUTH TWICE A DAY 60 tablet 11 Taking   • losartan (COZAAR) 50 MG tablet TAKE 1 TABLET BY MOUTH EVERY DAY 30 tablet 5 Taking   • metoprolol tartrate (LOPRESSOR) 50 MG tablet TAKE 1 TABLET BY MOUTH TWICE A DAY 60 tablet 6 Taking   • nitrofurantoin, macrocrystal-monohydrate, (MACROBID) 100 MG capsule Take 100 mg by mouth 2 (Two) Times a Day.      • ondansetron ODT (ZOFRAN-ODT) 4 MG disintegrating tablet Take 1 tablet by mouth 4 (Four) Times a Day As Needed for Nausea. 16 tablet 0    • oxyCODONE-acetaminophen (PERCOCET) 5-325 MG per tablet Take 1 tablet by mouth Every 8 (Eight) Hours As Needed for Moderate Pain . 12 tablet 0    • tamsulosin (FLOMAX) 0.4 MG capsule 24 hr capsule Take 1 capsule by mouth Every Night.          No Known Allergies    Objective   Objective     Vital Signs:   Temp:  [97.8 °F (36.6 °C)-98.6 °F (37 °C)] 97.8 °F (36.6 °C)  Heart Rate:  [72-75] 72  Resp:  [15-18] 18  BP: (126-137)/()  130/91        Physical Exam   Constitutional: Awake, alert, NAD, obese  Eyes: PERRLA, sclerae anicteric, no conjunctival injection  HENT: NCAT, mucous membranes moist  Neck: Supple, no thyromegaly, no lymphadenopathy, trachea midline  Respiratory: Clear to auscultation bilaterally, nonlabored respirations   Cardiovascular: RRR, no murmurs, rubs, or gallops, palpable pedal pulses bilaterally  Gastrointestinal: Positive bowel sounds, soft, nontender, nondistended  Musculoskeletal: No bilateral ankle edema, no clubbing or cyanosis to extremities  Psychiatric: Appropriate affect, cooperative  Neurologic: Oriented x 3, strength symmetric in all extremities, Cranial Nerves grossly intact to confrontation, speech clear  Skin: No rashes    Results Reviewed:  I have personally reviewed current lab, radiology, and data and agree.    Results from last 7 days   Lab Units 04/12/19  0555   WBC 10*3/mm3 11.76*   HEMOGLOBIN g/dL 13.7   HEMATOCRIT % 40.4   PLATELETS 10*3/mm3 227     Results from last 7 days   Lab Units 04/12/19  0555   SODIUM mmol/L 134*   POTASSIUM mmol/L 4.2   CHLORIDE mmol/L 101   CO2 mmol/L 23.0   BUN mg/dL 16   CREATININE mg/dL 1.27   GLUCOSE mg/dL 124*   CALCIUM mg/dL 8.7   ALT (SGPT) U/L 21   AST (SGOT) U/L 18     Estimated Creatinine Clearance: 92.6 mL/min (by C-G formula based on SCr of 1.27 mg/dL).  Brief Urine Lab Results  (Last result in the past 365 days)      Color   Clarity   Blood   Leuk Est   Nitrite   Protein   CREAT   Urine HCG        04/11/19 0149 Yellow Clear Negative Negative Negative Negative             No results found for: BNP  Imaging Results (last 24 hours)     ** No results found for the last 24 hours. **        Results for orders placed in visit on 01/09/17   Adult Transthoracic Echo Complete With Contrast    Narrative · Left ventricular function is normal. Estimated EF = 60%.  · Left ventricular wall thickness is consistent with mild concentric   hypertrophy.  · Right ventricular  cavity is borderline dilated.  · There is no evidence of pericardial effusion.  · No evidence of pulmonary hypertension is present.  · No significant structural valvular abnormality demonstrated.          Assessment/Plan   Assessment / Plan       Nephrolithiasis    PVC's (premature ventricular contractions)    A-fib (CMS/HCC)    Hyperlipemia    HTN (hypertension)    Intractable pain      Mr. Meléndez is a pleasant 45 yo WM w/ PMH of HTN, PVCs on Flecainide, PAF on ASA and nephrolithiasis who presents with left nephrolithiasis with associated hydronephrosis failing outpatient treatment.    Plan:    Left Nephrolithiasis with left hydronephrosis  --4-5 mm in diameter. Pt failed conservative outpatient treatment. Continue tamsulosin, IVFs, IV morphine, PO pain meds and antiemetics  --consulted Urology, appreciate their input  --NPO for possible intervention    PVCs  --on Flecainide, follows with Cardiology    PAF  --continue beta blocker, continue ASA    HTN  --continue beta blocker, hold ARB for now (had AYAKA when he was in the ED yesterday, likely due to above).        DVT prophylaxis:  mechanical    CODE STATUS:    Code Status and Medical Interventions:   Ordered at: 04/12/19 0856     Level Of Support Discussed With:    Patient     Code Status:    CPR     Medical Interventions (Level of Support Prior to Arrest):    Full       Admission Status:  I believe this patient meets INPATIENT status due to the need for care which can only be reasonably provided in an hospital setting such as possible need for aggressive/expedited ancillary services and/or consultation services, IV medications, close physician monitoring, and/or procedures.  In such, I feel patient’s risk for adverse outcomes and need for care warrant INPATIENT evaluation and predict the patient’s care encounter to likely last beyond 2 midnights.      Electronically signed by Cydney Juan MD, 04/12/19, 10:51 AM.        Electronically signed by Drake  "Cydney Azar MD at 4/12/2019 11:02 AM       ICU Vital Signs     Row Name 04/12/19 1120 04/12/19 0825 04/12/19 0821 04/12/19 0820 04/12/19 08:00:01       Vitals    Temp  97.7 °F (36.5 °C)  --  --  97.8 °F (36.6 °C)  --    Temp src  Oral  --  --  Oral  --    Pulse  70  --  --  72  75    Heart Rate Source  Monitor  --  --  Monitor  Monitor    Resp  18  --  --  18  16    Resp Rate Source  Visual  --  --  Visual  Visual    BP  127/90  --  130/91  130/93  134/100    Noninvasive MAP (mmHg)  98  --  106  106  114    BP Location  Left arm  --  Right arm  Left arm  --    BP Method  Automatic  --  Automatic  Automatic  --    Patient Position  Lying  --  Lying  Lying  --       Oxygen Therapy    SpO2  95 %  --  --  98 %  97 %    Pulse Oximetry Type  --  --  --  --  Continuous    Device (Oxygen Therapy)  --  room air  --  --  room air    Row Name 04/12/19 07:48:19 04/12/19 0730 04/12/19 0715 04/12/19 0701 04/12/19 0700       Vitals    Temp  98.4 °F (36.9 °C)  --  --  --  --    Temp src  Oral  --  --  --  --    BP  --  131/93  --  --  126/89    Noninvasive MAP (mmHg)  --  106  --  --  97       Oxygen Therapy    SpO2  --  97 %  97 %  97 %  --    Row Name 04/12/19 0646 04/12/19 0645 04/12/19 0339             Height and Weight    Height  --  --  185.4 cm (73\")      Height Method  --  --  Stated      Weight  --  --  103 kg (228 lb)      Weight Method  --  --  Stated      Ideal Body Weight (IBW) (kg)  --  --  84.86      BSA (Calculated - sq m)  --  --  2.28 sq meters      BMI (Calculated)  --  --  30.1      Weight in (lb) to have BMI = 25  --  --  189.1         Vitals    Temp  --  --  98.6 °F (37 °C)      Temp src  --  --  Oral      Pulse  --  --  73      Resp  --  --  15      Resp Rate Source  --  --  Visual      BP  --  137/97  134/91      Noninvasive MAP (mmHg)  --  107  --      BP Location  --  --  Left arm      BP Method  --  --  Automatic      Patient Position  --  --  Sitting         Oxygen Therapy    SpO2  97 %  --  96 %      " Device (Oxygen Therapy)  --  --  room air          Hospital Medications (active)       Dose Frequency Start End    acetaminophen (TYLENOL) tablet 650 mg 650 mg Every 4 Hours PRN 4/12/2019     Sig - Route: Take 2 tablets by mouth Every 4 (Four) Hours As Needed for Mild Pain . - Oral    amLODIPine (NORVASC) tablet 10 mg 10 mg Nightly 4/12/2019     Sig - Route: Take 1 tablet by mouth Every Night. - Oral    aspirin EC tablet 81 mg 81 mg Daily 4/12/2019     Sig - Route: Take 1 tablet by mouth Daily. - Oral    calcium carbonate (TUMS) chewable tablet 500 mg (200 mg elemental) 2 tablet 2 Times Daily PRN 4/12/2019     Sig - Route: Chew 1,000 mg 2 (Two) Times a Day As Needed for Heartburn. - Oral    flecainide (TAMBOCOR) tablet 50 mg 50 mg 2 Times Daily 4/12/2019     Sig - Route: Take 1 tablet by mouth 2 (Two) Times a Day. - Oral    HYDROmorphone (DILAUDID) injection 1 mg 1 mg Once 4/12/2019     Sig - Route: Infuse 1 mL into a venous catheter 1 (One) Time. - Intravenous    ketorolac (TORADOL) injection 15 mg 15 mg Once 4/12/2019 4/12/2019    Sig - Route: Infuse 1 mL into a venous catheter 1 (One) Time. - Intravenous    Cosign for Ordering: Accepted by Aaron Robin MD on 4/12/2019  8:38 AM    metoprolol tartrate (LOPRESSOR) tablet 50 mg 50 mg 2 Times Daily 4/12/2019     Sig - Route: Take 1 tablet by mouth 2 (Two) Times a Day. - Oral    morphine injection 1 mg 1 mg Every 3 Hours PRN 4/12/2019 4/22/2019    Sig - Route: Infuse 0.5 mL into a venous catheter Every 3 (Three) Hours As Needed for Severe Pain . - Intravenous    nitrofurantoin (macrocrystal-monohydrate) (MACROBID) capsule 100 mg 100 mg Every 12 Hours Scheduled 4/12/2019 4/19/2019    Sig - Route: Take 1 capsule by mouth Every 12 (Twelve) Hours. - Oral    ondansetron (ZOFRAN) injection 4 mg 4 mg Once 4/12/2019 4/12/2019    Sig - Route: Infuse 2 mL into a venous catheter 1 (One) Time. - Intravenous    Cosign for Ordering: Accepted by Aaron Robin MD on  "4/12/2019  8:38 AM    ondansetron (ZOFRAN) injection 4 mg 4 mg Every 6 Hours PRN 4/12/2019     Sig - Route: Infuse 2 mL into a venous catheter Every 6 (Six) Hours As Needed for Nausea or Vomiting. - Intravenous    Linked Group 1:  \"Or\" Linked Group Details        ondansetron (ZOFRAN) tablet 4 mg 4 mg Every 6 Hours PRN 4/12/2019     Sig - Route: Take 1 tablet by mouth Every 6 (Six) Hours As Needed for Nausea or Vomiting. - Oral    Linked Group 1:  \"Or\" Linked Group Details        oxyCODONE-acetaminophen (PERCOCET) 5-325 MG per tablet 1 tablet 1 tablet Every 8 Hours PRN 4/12/2019     Sig - Route: Take 1 tablet by mouth Every 8 (Eight) Hours As Needed for Moderate Pain . - Oral    sennosides-docusate sodium (SENOKOT-S) 8.6-50 MG tablet 2 tablet 2 tablet 2 Times Daily PRN 4/12/2019     Sig - Route: Take 2 tablets by mouth 2 (Two) Times a Day As Needed for Constipation. - Oral    sodium chloride 0.9 % bolus 1,000 mL 1,000 mL Once 4/12/2019 4/12/2019    Sig - Route: Infuse 1,000 mL into a venous catheter 1 (One) Time. - Intravenous    Cosign for Ordering: Accepted by Aaron Robin MD on 4/12/2019  8:38 AM    sodium chloride 0.9 % bolus 1,000 mL 1,000 mL Once 4/12/2019 4/12/2019    Sig - Route: Infuse 1,000 mL into a venous catheter 1 (One) Time. - Intravenous    sodium chloride 0.9 % flush 1-10 mL 1-10 mL As Needed 4/12/2019     Sig - Route: Infuse 1-10 mL into a venous catheter As Needed for Line Care. - Intravenous    sodium chloride 0.9 % flush 10 mL 10 mL As Needed 4/12/2019     Sig - Route: Infuse 10 mL into a venous catheter As Needed for Line Care. - Intravenous    Cosign for Ordering: Accepted by Aaron Robin MD on 4/12/2019  8:38 AM    sodium chloride 0.9 % flush 10 mL 10 mL As Needed 4/12/2019     Sig - Route: Infuse 10 mL into a venous catheter As Needed for Line Care. - Intravenous    Linked Group 2:  \"And\" Linked Group Details        sodium chloride 0.9 % flush 3 mL 3 mL Every 12 Hours " "Scheduled 4/12/2019     Sig - Route: Infuse 3 mL into a venous catheter Every 12 (Twelve) Hours. - Intravenous    sodium chloride 0.9 % infusion 100 mL/hr Continuous 4/12/2019 4/14/2019    Sig - Route: Infuse 100 mL/hr into a venous catheter Continuous. - Intravenous    tamsulosin (FLOMAX) 24 hr capsule 0.4 mg 0.4 mg Nightly 4/12/2019     Sig - Route: Take 1 capsule by mouth Every Night. - Oral    amLODIPine (NORVASC) tablet 10 mg (Discontinued) 10 mg Daily 4/12/2019 4/12/2019    Sig - Route: Take 1 tablet by mouth Daily. - Oral    HYDROmorphone (DILAUDID) injection 0.5 mg (Discontinued) 0.5 mg Every 2 Hours PRN 4/12/2019 4/12/2019    Sig - Route: Infuse 0.5 mL into a venous catheter Every 2 (Two) Hours As Needed for Severe Pain . - Intravenous    Linked Group 3:  \"And\" Linked Group Details        naloxone (NARCAN) injection 0.4 mg (Discontinued) 0.4 mg Every 5 Minutes PRN 4/12/2019 4/12/2019    Sig - Route: Infuse 1 mL into a venous catheter Every 5 (Five) Minutes As Needed for Respiratory Depression. - Intravenous    Linked Group 3:  \"And\" Linked Group Details              Study Result     INDICATION:   Left flank pain with hematuria. Pain radiates to suprapubic region. Ongoing for a week but worse tonight. History of appendectomy hernia repair and stone surgery.     TECHNIQUE:   CT of the abdomen and pelvis without contrast. Coronal and sagittal reconstructions were obtained.  Radiation dose reduction techniques included automated exposure control or exposure modulation based on body size. Radiation audit for number of CT and  nuclear cardiology exams performed in the last year: 0.       COMPARISON:   Abdomen pelvis CT scan 9/6/2017.     FINDINGS:  Lung bases: There is a small amount of atelectasis at the left lung base     Abdomen: Lack of intravenous contrast media limits assessment for pathology other than urinary tract calculus disease.     Liver, gallbladder, spleen, pancreas, adrenal glands, and right " kidney are normal.     There is left renal enlargement with perinephric stranding and moderate hydronephrosis. There are multiple intrarenal calculi on the left largest in the lower pole about 4 mm largest dimension. There is left-sided hydroureter to the level of a distal  obstructing stone about a centimeter proximal to the ureterovesical junction. Its about 4 to 5 mm in largest dimension.     Pelvis: Urinary bladder is decompressed. No bladder calculus is seen. No free fluid in the pelvis.     There are postoperative changes bilateral inguinal hernia repair.     There are postoperative changes of appendectomy. There is no evidence for bowel obstruction free intraperitoneal air or drainable fluid collection.     There is no abdominal aortic aneurysm or gross retroperitoneal lymphadenopathy.     IMPRESSION:     1. There is a 4 to 5 mm calculus in the distal left ureter about 1 cm proximal to the ureterovesical junction resulting in approximately moderate left-sided hydroureteronephrosis. This is new from comparison study of 2017. Mild left-sided perinephric  stranding and there are small intrarenal calculi on the left.  2. Post appendectomy. No bowel obstruction free air or drainable fluid collection.  3. Prior bilateral inguinal hernia repair.           Signer Name: Erika Matthews MD   Signed: 4/11/2019 2:22 AM   Workstation Name: [id number]       Imaging     CT Abdomen Pelvis Without Contrast (Order: [id number]) - 4/11/2019

## 2019-04-12 NOTE — ANESTHESIA PREPROCEDURE EVALUATION
Anesthesia Evaluation     Nursing notes reviewed     NPO Liquid Status: > 8 hours           Airway   Mallampati: II  TM distance: >3 FB  Neck ROM: full  No difficulty expected  Dental      Pulmonary     breath sounds clear to auscultation  Cardiovascular     ECG reviewed  Rhythm: regular  Rate: normal    (+) hypertension, dysrhythmias Paroxysmal Atrial Fib,       Neuro/Psych  GI/Hepatic/Renal/Endo      Musculoskeletal     Abdominal    Substance History      OB/GYN          Other                        Anesthesia Plan    ASA 2     general     intravenous induction   Anesthetic plan, all risks, benefits, and alternatives have been provided, discussed and informed consent has been obtained with: patient.    Plan discussed with CRNA.

## 2019-04-12 NOTE — PAYOR COMM NOTE
"Maryann Akins RN CM   Phone [phone]  Fax [phone]      Dede Brady (44 y.o. Male)     Date of Birth Social Security Number Address Home Phone MRN    [id number]  [address] [place] [address] [phone] [id number]    Yarsani Marital Status          None        Admission Date Admission Type Admitting Provider Attending Provider Department, Room/Bed    4/12/19 Emergency Cydney Juan MD Howard, Gabriela Kirk, MD [facility] 5G, S558/1    Discharge Date Discharge Disposition Discharge Destination                       Attending Provider:  Cydney Juan MD    Allergies:  No Known Allergies    Isolation:  None   Infection:  None   Code Status:  CPR    Ht:  185.4 cm (73\")   Wt:  103 kg (228 lb)    Admission Cmt:  None   Principal Problem:  None                Active Insurance as of 4/12/2019     Primary Coverage     Payor Plan Insurance Group Employer/Plan Group    [facility] [place] [id number]     Payor Plan Address Payor Plan Phone Number Payor Plan Fax Number Effective Dates    PO    3/7/2018 - None Entered    [facility] [id number]       Subscriber Name Subscriber Birth Date Member ID       DEDE BRADY 1975 [id number]                 Emergency Contacts      (Rel.) Home Phone Work Phone Mobile Phone    Rosalee Brady (Spouse) [phone] -- --               Emergency Department Notes      Aaron Robin MD at 4/12/2019  8:20 AM          Subjective   44-year-old male presents with complaint of left flank pain.  Patient reports sudden onset of left flank pain at approximately 1 AM this morning that awoke him from sleep.  The pain radiated from the left flank into the left groin and left testicle.  He reports associated nausea and vomiting with this.  Patient attempted Percocet at home with no relief.  And the patient was seen approximately 1 day prior here in the ER was diagnosed with a 4-5 mm left UVJ " stone.  At that given time the patient had some mild renal insufficiency, and was given fluid in the ER.  The patient also has been seen outpatient by a urologist for this current kidney stone, and they have advised the patient to come to the ER with any new or acute complaints.  The patient denies any fever or systemic symptoms of infection.  No chest pain or difficulty breathing.  No other aggravating, alleviating, or associated factors.            Review of Systems   Constitutional: Negative for chills, fatigue and fever.   HENT: Negative for congestion, ear pain, postnasal drip, sinus pressure and sore throat.    Eyes: Negative for pain, redness and visual disturbance.   Respiratory: Negative for cough, chest tightness and shortness of breath.    Cardiovascular: Negative for chest pain, palpitations and leg swelling.   Gastrointestinal: Positive for nausea and vomiting. Negative for abdominal pain, anal bleeding, blood in stool and diarrhea.   Endocrine: Negative for polydipsia and polyuria.   Genitourinary: Positive for flank pain. Negative for difficulty urinating, dysuria, frequency and urgency.   Musculoskeletal: Negative for arthralgias, back pain and neck pain.   Skin: Negative for pallor and rash.   Allergic/Immunologic: Negative for environmental allergies and immunocompromised state.   Neurological: Negative for dizziness, weakness and headaches.   Hematological: Negative for adenopathy.   Psychiatric/Behavioral: Negative for confusion, self-injury and suicidal ideas. The patient is not nervous/anxious.    All other systems reviewed and are negative.      Past Medical History:   Diagnosis Date   • Excessive caffeine intake    • Hypertension    • Kidney calculi    • PAC (premature atrial contraction)    • PAF (paroxysmal atrial fibrillation) (CMS/[facility])     CHADS-VASc = 1   • PVC (premature ventricular contraction)        No Known Allergies    Past Surgical History:   Procedure Laterality Date   •  "APPENDECTOMY     • BACK SURGERY     • CARDIAC CATHETERIZATION  06/2016    \"NORMAL\" PER DR. KIMANI PHIPPS   • CARDIAC ELECTROPHYSIOLOGY PROCEDURE N/A 9/28/2016    Procedure: PVA. Stop Flecainide 5 days prior to the procedure. Stop Metoprolol 3 days prior to the procedure.;  Surgeon: Kai Mitchell DO;  Location: [facility] INVASIVE LOCATION;  Service:    • CARDIOVERSION  06/24/2016   • EXTRACORPOREAL SHOCK WAVE LITHOTRIPSY (ESWL)     • HERNIA REPAIR     • LASIK     • NASAL SEPTUM SURGERY         Family History   Problem Relation Age of Onset   • Other Other         tachycardia   • No Known Problems Mother    • No Known Problems Father    • Arrhythmia Sister        Social History     Socioeconomic History   • Marital status:      Spouse name: Not on file   • Number of children: Not on file   • Years of education: Not on file   • Highest education level: Not on file   Occupational History   • Occupation: Employed   Tobacco Use   • Smoking status: Never Smoker   • Smokeless tobacco: Never Used   Substance and Sexual Activity   • Alcohol use: No   • Drug use: No   • Sexual activity: Yes     Partners: Female     Birth control/protection: None   Social History Narrative    Patient drinks 1-2 servings of caffeine per day.           Objective   Physical Exam   Constitutional: He is oriented to person, place, and time. He appears well-developed and well-nourished.  Non-toxic appearance. No distress.   HENT:   Head: Normocephalic and atraumatic.   Right Ear: External ear normal.   Left Ear: External ear normal.   Nose: Nose normal.   Eyes: EOM and lids are normal. Pupils are equal, round, and reactive to light.   Neck: Normal range of motion. Neck supple. No tracheal deviation present.   Cardiovascular: Normal rate, regular rhythm and normal heart sounds. Exam reveals no gallop, no friction rub and no decreased pulses.   No murmur heard.  Pulmonary/Chest: Effort normal and breath sounds normal. No respiratory distress. " He has no decreased breath sounds. He has no wheezes. He has no rhonchi. He has no rales.   Abdominal: Soft. Normal appearance and bowel sounds are normal. There is tenderness in the suprapubic area. There is CVA tenderness. There is no rebound and no guarding.   Musculoskeletal: Normal range of motion. He exhibits no deformity.   Lymphadenopathy:     He has no cervical adenopathy.   Neurological: He is alert and oriented to person, place, and time. He has normal strength. No cranial nerve deficit or sensory deficit.   Skin: Skin is warm and dry. No rash noted. He is not diaphoretic.   Psychiatric: He has a normal mood and affect. His speech is normal and behavior is normal. Judgment and thought content normal. Cognition and memory are normal.   Nursing note and vitals reviewed.      Procedures          ED Course                  MDM  Number of Diagnoses or Management Options  Intractable pain: new and requires workup  Kidney stone on left side: new and requires workup  Diagnosis management comments: Repeat laboratory work was performed today, and the kidney function is improved, most likely secondary to the IV fluids that were administered yesterday.    Toradol was administered here in the ER the patient's kidney stone pain was improved.    Due to the patient having multiple repeat presentations and pain that is not being well controlled with oral medications I have elected to admit the patient for urological evaluation, which per the patient was the instructions that was given by his urologist when he was recommended to come to the ER.    I discussed the patient with the hospitalist, Dr. Dickinson, who will admit.       Amount and/or Complexity of Data Reviewed  Clinical lab tests: ordered and reviewed  Tests in the radiology section of CPT®:  reviewed  Obtain history from someone other than the patient: yes  Review and summarize past medical records: yes  Discuss the patient with other providers: yes  Independent  "visualization of images, tracings, or specimens: yes          Final diagnoses:   Intractable pain   Kidney stone on left side            Aaron Robin MD  04/12/19 0824      Electronically signed by Aaron Robin MD at 4/12/2019  8:24 AM       ICU Vital Signs     Row Name 04/12/19 0825 04/12/19 0821 04/12/19 0820 04/12/19 08:00:01 04/12/19 07:48:19       Vitals    Temp  --  --  97.8 °F (36.6 °C)  --  98.4 °F (36.9 °C)    Temp src  --  --  Oral  --  Oral    Pulse  --  --  72  75  --    Heart Rate Source  --  --  Monitor  Monitor  --    Resp  --  --  18  16  --    Resp Rate Source  --  --  Visual  Visual  --    BP  --  130/91  130/93  134/100  --    Noninvasive MAP (mmHg)  --  106  106  114  --    BP Location  --  Right arm  Left arm  --  --    BP Method  --  Automatic  Automatic  --  --    Patient Position  --  Lying  Lying  --  --       Oxygen Therapy    SpO2  --  --  98 %  97 %  --    Pulse Oximetry Type  --  --  --  Continuous  --    Device (Oxygen Therapy)  room air  --  --  room air  --    Row Name 04/12/19 0730 04/12/19 0715 04/12/19 0701 04/12/19 0700 04/12/19 0646       Vitals    BP  131/93  --  --  126/89  --    Noninvasive MAP (mmHg)  106  --  --  97  --       Oxygen Therapy    SpO2  97 %  97 %  97 %  --  97 %    Row Name 04/12/19 0645 04/12/19 0339                Height and Weight    Height  --  185.4 cm (73\")       Height Method  --  Stated       Weight  --  103 kg (228 lb)       Weight Method  --  Stated       Ideal Body Weight (IBW) (kg)  --  84.86       BSA (Calculated - sq m)  --  2.28 sq meters       BMI (Calculated)  --  30.1       Weight in (lb) to have BMI = 25  --  189.1          Vitals    Temp  --  98.6 °F (37 °C)       Temp src  --  Oral       Pulse  --  73       Resp  --  15       Resp Rate Source  --  Visual       BP  137/97  134/91       Noninvasive MAP (mmHg)  107  --       BP Location  --  Left arm       BP Method  --  Automatic       Patient Position  --  Sitting          " "Oxygen Therapy    SpO2  --  96 %       Device (Oxygen Therapy)  --  room air           Hospital Medications (all)       Dose Frequency Start End    acetaminophen (TYLENOL) tablet 650 mg 650 mg Every 4 Hours PRN 4/12/2019     Sig - Route: Take 2 tablets by mouth Every 4 (Four) Hours As Needed for Mild Pain . - Oral    amLODIPine (NORVASC) tablet 10 mg 10 mg Daily 4/12/2019     Sig - Route: Take 1 tablet by mouth Daily. - Oral    aspirin EC tablet 81 mg 81 mg Daily 4/12/2019     Sig - Route: Take 1 tablet by mouth Daily. - Oral    calcium carbonate (TUMS) chewable tablet 500 mg (200 mg elemental) 2 tablet 2 Times Daily PRN 4/12/2019     Sig - Route: Chew 1,000 mg 2 (Two) Times a Day As Needed for Heartburn. - Oral    flecainide (TAMBOCOR) tablet 50 mg 50 mg 2 Times Daily 4/12/2019     Sig - Route: Take 1 tablet by mouth 2 (Two) Times a Day. - Oral    HYDROmorphone (DILAUDID) injection 0.5 mg 0.5 mg Every 2 Hours PRN 4/12/2019 4/22/2019    Sig - Route: Infuse 0.5 mL into a venous catheter Every 2 (Two) Hours As Needed for Severe Pain . - Intravenous    Linked Group 1:  \"And\" Linked Group Details        HYDROmorphone (DILAUDID) injection 1 mg 1 mg Once 4/12/2019     Sig - Route: Infuse 1 mL into a venous catheter 1 (One) Time. - Intravenous    ketorolac (TORADOL) injection 15 mg 15 mg Once 4/12/2019 4/12/2019    Sig - Route: Infuse 1 mL into a venous catheter 1 (One) Time. - Intravenous    Cosign for Ordering: Accepted by Aaron Robin MD on 4/12/2019  8:38 AM    metoprolol tartrate (LOPRESSOR) tablet 50 mg 50 mg 2 Times Daily 4/12/2019     Sig - Route: Take 1 tablet by mouth 2 (Two) Times a Day. - Oral    naloxone (NARCAN) injection 0.4 mg 0.4 mg Every 5 Minutes PRN 4/12/2019     Sig - Route: Infuse 1 mL into a venous catheter Every 5 (Five) Minutes As Needed for Respiratory Depression. - Intravenous    Linked Group 1:  \"And\" Linked Group Details        nitrofurantoin (macrocrystal-monohydrate) (MACROBID) " "capsule 100 mg 100 mg Every 12 Hours Scheduled 4/12/2019 4/19/2019    Sig - Route: Take 1 capsule by mouth Every 12 (Twelve) Hours. - Oral    ondansetron (ZOFRAN) injection 4 mg 4 mg Once 4/12/2019 4/12/2019    Sig - Route: Infuse 2 mL into a venous catheter 1 (One) Time. - Intravenous    Cosign for Ordering: Accepted by Aaron Robin MD on 4/12/2019  8:38 AM    ondansetron (ZOFRAN) injection 4 mg 4 mg Every 6 Hours PRN 4/12/2019     Sig - Route: Infuse 2 mL into a venous catheter Every 6 (Six) Hours As Needed for Nausea or Vomiting. - Intravenous    Linked Group 2:  \"Or\" Linked Group Details        ondansetron (ZOFRAN) tablet 4 mg 4 mg Every 6 Hours PRN 4/12/2019     Sig - Route: Take 1 tablet by mouth Every 6 (Six) Hours As Needed for Nausea or Vomiting. - Oral    Linked Group 2:  \"Or\" Linked Group Details        oxyCODONE-acetaminophen (PERCOCET) 5-325 MG per tablet 1 tablet 1 tablet Every 8 Hours PRN 4/12/2019     Sig - Route: Take 1 tablet by mouth Every 8 (Eight) Hours As Needed for Moderate Pain . - Oral    sennosides-docusate sodium (SENOKOT-S) 8.6-50 MG tablet 2 tablet 2 tablet 2 Times Daily PRN 4/12/2019     Sig - Route: Take 2 tablets by mouth 2 (Two) Times a Day As Needed for Constipation. - Oral    sodium chloride 0.9 % bolus 1,000 mL 1,000 mL Once 4/12/2019 4/12/2019    Sig - Route: Infuse 1,000 mL into a venous catheter 1 (One) Time. - Intravenous    Cosign for Ordering: Accepted by Aaron Robin MD on 4/12/2019  8:38 AM    sodium chloride 0.9 % bolus 1,000 mL 1,000 mL Once 4/12/2019 4/12/2019    Sig - Route: Infuse 1,000 mL into a venous catheter 1 (One) Time. - Intravenous    sodium chloride 0.9 % flush 1-10 mL 1-10 mL As Needed 4/12/2019     Sig - Route: Infuse 1-10 mL into a venous catheter As Needed for Line Care. - Intravenous    sodium chloride 0.9 % flush 10 mL 10 mL As Needed 4/12/2019     Sig - Route: Infuse 10 mL into a venous catheter As Needed for Line Care. - Intravenous " "   Cosign for Ordering: Accepted by Aaron Robin MD on 4/12/2019  8:38 AM    sodium chloride 0.9 % flush 10 mL 10 mL As Needed 4/12/2019     Sig - Route: Infuse 10 mL into a venous catheter As Needed for Line Care. - Intravenous    Linked Group 3:  \"And\" Linked Group Details        sodium chloride 0.9 % flush 3 mL 3 mL Every 12 Hours Scheduled 4/12/2019     Sig - Route: Infuse 3 mL into a venous catheter Every 12 (Twelve) Hours. - Intravenous    sodium chloride 0.9 % infusion 100 mL/hr Continuous 4/12/2019 4/14/2019    Sig - Route: Infuse 100 mL/hr into a venous catheter Continuous. - Intravenous    tamsulosin (FLOMAX) 24 hr capsule 0.4 mg 0.4 mg Nightly 4/12/2019     Sig - Route: Take 1 capsule by mouth Every Night. - Oral          Lab Results (last 72 hours)     Procedure Component Value Units Date/Time    Comprehensive Metabolic Panel [[id number]]  (Abnormal) Collected:  04/12/19 0555    Specimen:  Blood Updated:  04/12/19 0708     Glucose 124 mg/dL      BUN 16 mg/dL      Creatinine 1.27 mg/dL      Sodium 134 mmol/L      Potassium 4.2 mmol/L      Chloride 101 mmol/L      CO2 23.0 mmol/L      Calcium 8.7 mg/dL      Total Protein 6.8 g/dL      Albumin 3.90 g/dL      ALT (SGPT) 21 U/L      AST (SGOT) 18 U/L      Comment: Specimen hemolyzed.  Results may be affected.        Alkaline Phosphatase 47 U/L      Total Bilirubin 0.4 mg/dL      eGFR Non African Amer 61 mL/min/1.73      Globulin 2.9 gm/dL      A/G Ratio 1.3 g/dL      BUN/Creatinine Ratio 12.6     Anion Gap 10.0 mmol/L     Narrative:       GFR Normal >60  Chronic Kidney Disease <60  Kidney Failure <15    CBC & Differential [[id number]] Collected:  04/12/19 0555    Specimen:  Blood Updated:  04/12/19 0627    Narrative:       The following orders were created for panel order CBC & Differential.  Procedure                               Abnormality         Status                     ---------                               -----------         ------          "            CBC Auto Differential[[id number]]        Abnormal            Final result                 Please view results for these tests on the individual orders.    CBC Auto Differential [[id number]]  (Abnormal) Collected:  04/12/19 0555    Specimen:  Blood Updated:  04/12/19 0627     WBC 11.76 10*3/mm3      RBC 4.39 10*6/mm3      Hemoglobin 13.7 g/dL      Hematocrit 40.4 %      MCV 92.0 fL      MCH 31.2 pg      MCHC 33.9 g/dL      RDW 12.2 %      RDW-SD 41.2 fl      MPV 11.7 fL      Platelets 227 10*3/mm3      Neutrophil % 71.7 %      Lymphocyte % 17.3 %      Monocyte % 8.6 %      Eosinophil % 2.2 %      Basophil % 0.2 %      Immature Grans % 0.2 %      Neutrophils, Absolute 8.43 10*3/mm3      Lymphocytes, Absolute 2.04 10*3/mm3      Monocytes, Absolute 1.01 10*3/mm3      Eosinophils, Absolute 0.26 10*3/mm3      Basophils, Absolute 0.02 10*3/mm3      Immature Grans, Absolute 0.02 10*3/mm3           Imaging Results (last 24 hours)     ** No results found for the last 24 hours. **        Orders (last 24 hrs)     Start     Ordered    04/13/19 0600  Basic Metabolic Panel  Morning Draw      04/12/19 0856    04/13/19 0600  CBC (No Diff)  Morning Draw      04/12/19 0856    04/12/19 2100  tamsulosin (FLOMAX) 24 hr capsule 0.4 mg  Nightly      04/12/19 0856    04/12/19 1200  Vital Signs  Every 4 Hours      04/12/19 0856    04/12/19 1000  amLODIPine (NORVASC) tablet 10 mg  Daily      04/12/19 0856    04/12/19 1000  aspirin EC tablet 81 mg  Daily      04/12/19 0856    04/12/19 1000  flecainide (TAMBOCOR) tablet 50 mg  2 Times Daily      04/12/19 0856    04/12/19 1000  metoprolol tartrate (LOPRESSOR) tablet 50 mg  2 Times Daily      04/12/19 0856    04/12/19 1000  nitrofurantoin (macrocrystal-monohydrate) (MACROBID) capsule 100 mg  Every 12 Hours Scheduled      04/12/19 0856    04/12/19 0945  sodium chloride 0.9 % flush 3 mL  Every 12 Hours Scheduled      04/12/19 0856    04/12/19 0945  sodium chloride 0.9 % infusion   Continuous      04/12/19 0856    04/12/19 0856  calcium carbonate (TUMS) chewable tablet 500 mg (200 mg elemental)  2 Times Daily PRN      04/12/19 0856    04/12/19 0856  ondansetron (ZOFRAN) tablet 4 mg  Every 6 Hours PRN      04/12/19 0856    04/12/19 0856  ondansetron (ZOFRAN) injection 4 mg  Every 6 Hours PRN      04/12/19 0856    04/12/19 0856  sennosides-docusate sodium (SENOKOT-S) 8.6-50 MG tablet 2 tablet  2 Times Daily PRN      04/12/19 0856    04/12/19 0856  HYDROmorphone (DILAUDID) injection 0.5 mg  Every 2 Hours PRN      04/12/19 0856    04/12/19 0856  naloxone (NARCAN) injection 0.4 mg  Every 5 Minutes PRN      04/12/19 0856    04/12/19 0856  NPO Diet  Diet Effective Now      04/12/19 0856    04/12/19 0856  Inpatient Urology Consult  Once     Specialty:  Urology  Provider:  Johnson White MD    04/12/19 0856    04/12/19 0856  Urinalysis With Culture If Indicated - Urine, Clean Catch  Once      04/12/19 0856    04/12/19 0855  Activity - Ad Becky  Until Discontinued      04/12/19 0856    04/12/19 0855  Intake & Output  Every Shift      04/12/19 0856    04/12/19 0855  Weigh Patient  Once      04/12/19 0856    04/12/19 0855  Oxygen Therapy- Nasal Cannula; Titrate for SPO2: 90%  Continuous      04/12/19 0856    04/12/19 0855  Insert Peripheral IV  Once      04/12/19 0856    04/12/19 0855  Saline Lock & Maintain IV Access  Continuous      04/12/19 0856    04/12/19 0855  Code Status and Medical Interventions:  Continuous      04/12/19 0856    04/12/19 0855  Place Sequential Compression Device  Once      04/12/19 0856    04/12/19 0855  Maintain Sequential Compression Device  Continuous      04/12/19 0856    04/12/19 0855  Place Venous Foot Pump  Once      04/12/19 0856    04/12/19 0855  Maintain Venous Foot Pump  Continuous      04/12/19 0856    04/12/19 0854  sodium chloride 0.9 % flush 1-10 mL  As Needed      04/12/19 0856    04/12/19 0854  acetaminophen (TYLENOL) tablet 650 mg  Every 4 Hours PRN       04/12/19 0856    04/12/19 0853  oxyCODONE-acetaminophen (PERCOCET) 5-325 MG per tablet 1 tablet  Every 8 Hours PRN      04/12/19 0856    04/12/19 0853  Initiate Observation Status  Once      04/12/19 0853    04/12/19 0659  Initiate Observation Status  Once      04/12/19 0658    04/12/19 0542  sodium chloride 0.9 % bolus 1,000 mL  Once      04/12/19 0540    04/12/19 0542  HYDROmorphone (DILAUDID) injection 1 mg  Once      04/12/19 0540    04/12/19 0541  CBC & Differential  Once      04/12/19 0540    04/12/19 0541  Comprehensive Metabolic Panel  Once      04/12/19 0540    04/12/19 0541  Insert peripheral IV  Once      04/12/19 0540    04/12/19 0541  CBC Auto Differential  PROCEDURE ONCE      04/12/19 0540    04/12/19 0540  sodium chloride 0.9 % flush 10 mL  As Needed      04/12/19 0540    04/12/19 0423  sodium chloride 0.9 % bolus 1,000 mL  Once      04/12/19 0421    04/12/19 0423  ketorolac (TORADOL) injection 15 mg  Once      04/12/19 0421    04/12/19 0423  ondansetron (ZOFRAN) injection 4 mg  Once      04/12/19 0421    04/12/19 0350  NPO Diet  Diet Effective Now,   Status:  Canceled      04/12/19 0349    04/12/19 0350  Undress and Gown  Once      04/12/19 0349    04/12/19 0350  Insert peripheral IV  Once      04/12/19 0349    04/12/19 0350  [place] Draw  Once,   Status:  Canceled      04/12/19 0349    04/12/19 0350  CBC & Differential  Once,   Status:  Canceled      04/12/19 0349    04/12/19 0350  Comprehensive Metabolic Panel  Once,   Status:  Canceled      04/12/19 0349    04/12/19 0350  Lipase  Once,   Status:  Canceled      04/12/19 0349    04/12/19 0350  Urinalysis With Microscopic If Indicated (No Culture) - Urine, Clean Catch  Once,   Status:  Canceled      04/12/19 0349 04/12/19 0350  Light Blue Top  PROCEDURE ONCE,   Status:  Canceled      04/12/19 0349 04/12/19 0350  Green Top (Gel)  PROCEDURE ONCE,   Status:  Canceled      04/12/19 0349 04/12/19 0350  Lavender Top  PROCEDURE ONCE,   Status:   "Canceled      04/12/19 0349 04/12/19 0350  Gold Top - SST  PROCEDURE ONCE,   Status:  Canceled      04/12/19 0349 04/12/19 0350  Green Top (No Gel)  PROCEDURE ONCE,   Status:  Canceled      04/12/19 0349 04/12/19 0350  CBC Auto Differential  PROCEDURE ONCE,   Status:  Canceled      04/12/19 0349 04/12/19 0349  sodium chloride 0.9 % flush 10 mL  As Needed      04/12/19 0349        CT Abdomen Pelvis Without Contrast [[id number]] (Order [id number])   Order   Status: Final result   Patient Location     Patient Class Location   Observation [facility] 5G, S558, 1     [phone]      Study Notes        Esau Hernandez on 4/11/2019  2:05 AM   LT FLANK PAIN, HEMATURIA, PAIN RADIATING TO SUPRAPUBIC REGION, PAIN ONGOING FOR A WEEK BUT WORSE TONIGHT, STONE SURG AND APPENDECTOMY, \"DOUBLE HERNIA REPAIR      Appointment Information     PACS Images      Radiology Images   Study Result     INDICATION:   Left flank pain with hematuria. Pain radiates to suprapubic region. Ongoing for a week but worse tonight. History of appendectomy hernia repair and stone surgery.     TECHNIQUE:   CT of the abdomen and pelvis without contrast. Coronal and sagittal reconstructions were obtained.  Radiation dose reduction techniques included automated exposure control or exposure modulation based on body size. Radiation audit for number of CT and  nuclear cardiology exams performed in the last year: 0.       COMPARISON:   Abdomen pelvis CT scan 9/6/2017.     FINDINGS:  Lung bases: There is a small amount of atelectasis at the left lung base     Abdomen: Lack of intravenous contrast media limits assessment for pathology other than urinary tract calculus disease.     Liver, gallbladder, spleen, pancreas, adrenal glands, and right kidney are normal.     There is left renal enlargement with perinephric stranding and moderate hydronephrosis. There are multiple intrarenal calculi on the left largest in the lower pole about 4 mm largest dimension. " There is left-sided hydroureter to the level of a distal  obstructing stone about a centimeter proximal to the ureterovesical junction. Its about 4 to 5 mm in largest dimension.     Pelvis: Urinary bladder is decompressed. No bladder calculus is seen. No free fluid in the pelvis.     There are postoperative changes bilateral inguinal hernia repair.     There are postoperative changes of appendectomy. There is no evidence for bowel obstruction free intraperitoneal air or drainable fluid collection.     There is no abdominal aortic aneurysm or gross retroperitoneal lymphadenopathy.     IMPRESSION:     1. There is a 4 to 5 mm calculus in the distal left ureter about 1 cm proximal to the ureterovesical junction resulting in approximately moderate left-sided hydroureteronephrosis. This is new from comparison study of 2017. Mild left-sided perinephric  stranding and there are small intrarenal calculi on the left.  2. Post appendectomy. No bowel obstruction free air or drainable fluid collection.  3. Prior bilateral inguinal hernia repair.           Signer Name: Erika Matthews MD   Signed: 4/11/2019 2:22 AM   Workstation Name: [id number]       Imaging     CT Abdomen Pelvis Without Contrast (Order: [id number]) - 4/11/2019   Reprint Order Requisition     CT Abdomen Pelvis Without Contrast (Order #[id number]) on 4/11/19   Signed by     Signed Date/Time  Phone Pager   ERIKA MATTHEWS 4/11/2019 02:22 [phone]    Exam Information     Status Exam Begun  Exam Ended    Final [99] 4/11/2019 01:57 4/11/2019 02:03   Questions      Will Oral Contrast be needed for this procedure? No      External Results Report     Open External Results Report    Encounter     View Encounter          Intra Procedure Documentation     Intra Procedure Documentation   Order-Level Documents:     There are no order-level documents.   Encounter-Level Documents - 04/11/2019:     Document on 4/11/2019 4:45 AM by Char Moreno, APRN: ED After Visit Summary    Electronic signature on 4/11/2019 1:21 AM - Signed          Implants     None   Patient Care Timeline     No data selected in time range   Reason For Exam   Priority: STAT   flank pain   Results Routing Tracking     View Results Routing Information   Order Report     Order Details

## 2019-04-12 NOTE — ED PROVIDER NOTES
Subjective   44-year-old male presents with complaint of left flank pain.  Patient reports sudden onset of left flank pain at approximately 1 AM this morning that awoke him from sleep.  The pain radiated from the left flank into the left groin and left testicle.  He reports associated nausea and vomiting with this.  Patient attempted Percocet at home with no relief.  And the patient was seen approximately 1 day prior here in the ER was diagnosed with a 4-5 mm left UVJ stone.  At that given time the patient had some mild renal insufficiency, and was given fluid in the ER.  The patient also has been seen outpatient by a urologist for this current kidney stone, and they have advised the patient to come to the ER with any new or acute complaints.  The patient denies any fever or systemic symptoms of infection.  No chest pain or difficulty breathing.  No other aggravating, alleviating, or associated factors.            Review of Systems   Constitutional: Negative for chills, fatigue and fever.   HENT: Negative for congestion, ear pain, postnasal drip, sinus pressure and sore throat.    Eyes: Negative for pain, redness and visual disturbance.   Respiratory: Negative for cough, chest tightness and shortness of breath.    Cardiovascular: Negative for chest pain, palpitations and leg swelling.   Gastrointestinal: Positive for nausea and vomiting. Negative for abdominal pain, anal bleeding, blood in stool and diarrhea.   Endocrine: Negative for polydipsia and polyuria.   Genitourinary: Positive for flank pain. Negative for difficulty urinating, dysuria, frequency and urgency.   Musculoskeletal: Negative for arthralgias, back pain and neck pain.   Skin: Negative for pallor and rash.   Allergic/Immunologic: Negative for environmental allergies and immunocompromised state.   Neurological: Negative for dizziness, weakness and headaches.   Hematological: Negative for adenopathy.   Psychiatric/Behavioral: Negative for confusion,  "self-injury and suicidal ideas. The patient is not nervous/anxious.    All other systems reviewed and are negative.      Past Medical History:   Diagnosis Date   • Excessive caffeine intake    • Hypertension    • Kidney calculi    • PAC (premature atrial contraction)    • PAF (paroxysmal atrial fibrillation) (CMS/HCC)     CHADS-VASc = 1   • PVC (premature ventricular contraction)        No Known Allergies    Past Surgical History:   Procedure Laterality Date   • APPENDECTOMY     • BACK SURGERY     • CARDIAC CATHETERIZATION  06/2016    \"NORMAL\" PER DR. KIMANI PHIPPS   • CARDIAC ELECTROPHYSIOLOGY PROCEDURE N/A 9/28/2016    Procedure: PVA. Stop Flecainide 5 days prior to the procedure. Stop Metoprolol 3 days prior to the procedure.;  Surgeon: Kai Mitchell DO;  Location: [facility] INVASIVE LOCATION;  Service:    • CARDIOVERSION  06/24/2016   • EXTRACORPOREAL SHOCK WAVE LITHOTRIPSY (ESWL)     • HERNIA REPAIR     • LASIK     • NASAL SEPTUM SURGERY         Family History   Problem Relation Age of Onset   • Other Other         tachycardia   • No Known Problems Mother    • No Known Problems Father    • Arrhythmia Sister        Social History     Socioeconomic History   • Marital status:      Spouse name: Not on file   • Number of children: Not on file   • Years of education: Not on file   • Highest education level: Not on file   Occupational History   • Occupation: Employed   Tobacco Use   • Smoking status: Never Smoker   • Smokeless tobacco: Never Used   Substance and Sexual Activity   • Alcohol use: No   • Drug use: No   • Sexual activity: Yes     Partners: Female     Birth control/protection: None   Social History Narrative    Patient drinks 1-2 servings of caffeine per day.           Objective   Physical Exam   Constitutional: He is oriented to person, place, and time. He appears well-developed and well-nourished.  Non-toxic appearance. No distress.   HENT:   Head: Normocephalic and atraumatic.   Right Ear: " External ear normal.   Left Ear: External ear normal.   Nose: Nose normal.   Eyes: EOM and lids are normal. Pupils are equal, round, and reactive to light.   Neck: Normal range of motion. Neck supple. No tracheal deviation present.   Cardiovascular: Normal rate, regular rhythm and normal heart sounds. Exam reveals no gallop, no friction rub and no decreased pulses.   No murmur heard.  Pulmonary/Chest: Effort normal and breath sounds normal. No respiratory distress. He has no decreased breath sounds. He has no wheezes. He has no rhonchi. He has no rales.   Abdominal: Soft. Normal appearance and bowel sounds are normal. There is tenderness in the suprapubic area. There is CVA tenderness. There is no rebound and no guarding.   Musculoskeletal: Normal range of motion. He exhibits no deformity.   Lymphadenopathy:     He has no cervical adenopathy.   Neurological: He is alert and oriented to person, place, and time. He has normal strength. No cranial nerve deficit or sensory deficit.   Skin: Skin is warm and dry. No rash noted. He is not diaphoretic.   Psychiatric: He has a normal mood and affect. His speech is normal and behavior is normal. Judgment and thought content normal. Cognition and memory are normal.   Nursing note and vitals reviewed.      Procedures           ED Course                  MDM  Number of Diagnoses or Management Options  Intractable pain: new and requires workup  Kidney stone on left side: new and requires workup  Diagnosis management comments: Repeat laboratory work was performed today, and the kidney function is improved, most likely secondary to the IV fluids that were administered yesterday.    Toradol was administered here in the ER the patient's kidney stone pain was improved.    Due to the patient having multiple repeat presentations and pain that is not being well controlled with oral medications I have elected to admit the patient for urological evaluation, which per the patient was the  instructions that was given by his urologist when he was recommended to come to the ER.    I discussed the patient with the hospitalist, Dr. Dickinson, who will admit.       Amount and/or Complexity of Data Reviewed  Clinical lab tests: ordered and reviewed  Tests in the radiology section of CPT®: reviewed  Obtain history from someone other than the patient: yes  Review and summarize past medical records: yes  Discuss the patient with other providers: yes  Independent visualization of images, tracings, or specimens: yes          Final diagnoses:   Intractable pain   Kidney stone on left side            Aaron Robin MD  04/12/19 0885

## 2019-04-13 VITALS
RESPIRATION RATE: 18 BRPM | WEIGHT: 228 LBS | OXYGEN SATURATION: 95 % | TEMPERATURE: 98.4 F | HEIGHT: 73 IN | HEART RATE: 78 BPM | BODY MASS INDEX: 30.22 KG/M2 | SYSTOLIC BLOOD PRESSURE: 129 MMHG | DIASTOLIC BLOOD PRESSURE: 83 MMHG

## 2019-04-13 LAB
ANION GAP SERPL CALCULATED.3IONS-SCNC: 13 MMOL/L
BUN BLD-MCNC: 15 MG/DL (ref 6–20)
BUN/CREAT SERPL: 12.8 (ref 7–25)
CALCIUM SPEC-SCNC: 9.6 MG/DL (ref 8.6–10.5)
CHLORIDE SERPL-SCNC: 99 MMOL/L (ref 98–107)
CO2 SERPL-SCNC: 23 MMOL/L (ref 22–29)
CREAT BLD-MCNC: 1.17 MG/DL (ref 0.76–1.27)
DEPRECATED RDW RBC AUTO: 40 FL (ref 37–54)
ERYTHROCYTE [DISTWIDTH] IN BLOOD BY AUTOMATED COUNT: 12 % (ref 12.3–15.4)
GFR SERPL CREATININE-BSD FRML MDRD: 67 ML/MIN/1.73
GLUCOSE BLD-MCNC: 153 MG/DL (ref 65–99)
HCT VFR BLD AUTO: 41.4 % (ref 37.5–51)
HGB BLD-MCNC: 14.3 G/DL (ref 13–17.7)
MCH RBC QN AUTO: 31.4 PG (ref 26.6–33)
MCHC RBC AUTO-ENTMCNC: 34.5 G/DL (ref 31.5–35.7)
MCV RBC AUTO: 90.8 FL (ref 79–97)
PLATELET # BLD AUTO: 247 10*3/MM3 (ref 140–450)
PMV BLD AUTO: 11.7 FL (ref 6–12)
POTASSIUM BLD-SCNC: 4.5 MMOL/L (ref 3.5–5.2)
RBC # BLD AUTO: 4.56 10*6/MM3 (ref 4.14–5.8)
SODIUM BLD-SCNC: 135 MMOL/L (ref 136–145)
WBC NRBC COR # BLD: 6.39 10*3/MM3 (ref 3.4–10.8)

## 2019-04-13 PROCEDURE — 80048 BASIC METABOLIC PNL TOTAL CA: CPT | Performed by: INTERNAL MEDICINE

## 2019-04-13 PROCEDURE — 99238 HOSP IP/OBS DSCHRG MGMT 30/<: CPT | Performed by: INTERNAL MEDICINE

## 2019-04-13 PROCEDURE — 85027 COMPLETE CBC AUTOMATED: CPT | Performed by: INTERNAL MEDICINE

## 2019-04-13 RX ADMIN — METOPROLOL TARTRATE 50 MG: 50 TABLET ORAL at 08:49

## 2019-04-13 RX ADMIN — OXYCODONE AND ACETAMINOPHEN 1 TABLET: 5; 325 TABLET ORAL at 08:55

## 2019-04-13 RX ADMIN — OXYCODONE AND ACETAMINOPHEN 1 TABLET: 5; 325 TABLET ORAL at 01:30

## 2019-04-13 RX ADMIN — FLECAINIDE ACETATE 50 MG: 50 TABLET ORAL at 08:49

## 2019-04-13 RX ADMIN — ASPIRIN 81 MG: 81 TABLET, COATED ORAL at 08:49

## 2019-04-13 NOTE — DISCHARGE SUMMARY
[facility] Medicine Services  DISCHARGE SUMMARY    Patient Name: Mikie Meléndez  : 1975  MRN: [id number]    Date of Admission: 2019  Date of Discharge:  2019  Primary Care Physician: Heather Ibarra MD    Consults     Date and Time Order Name Status Description    2019 0856 Inpatient Urology Consult            Hospital Course     Presenting Problem:   Intractable pain [R52]  Nephrolithiasis [N20.0]  Intractable pain [R52]    Active Hospital Problems    Diagnosis  POA   • Intractable pain [R52]  Yes   • Nephrolithiasis [N20.0]  Unknown   • PVC's (premature ventricular contractions) [I49.3]  Yes   • A-fib (CMS/HCC) [I48.91]  Yes   • HTN (hypertension) [I10]  Yes   • Hyperlipemia [E78.5]  Yes      Resolved Hospital Problems   No resolved problems to display.          Hospital Course:  Mikie Meléndez is a 44 y.o. male w/ PMH of HTN, PVCs on Flecainide, PAF on ASA and nephrolithiasis who presents with 4-5mm left nephrolithiasis with associated hydronephrosis failing outpatient treatment. Patient underwent left ureteral stone basket extraction and placement of left ureteral stent on 19 with Dr. White. Post-op he has done well. Labs are all stable. He has had some mild increase in his hematuria, but pain overall improved. He has been cleared by urology for discharge and will need stent removed in 72 hrs. He will continue macrobid and PRN pain medication. He will follow-up with Dr. Olivas in 2 weeks.      Discharge Follow Up Recommendations for labs/diagnostics:  - follow up with Dr. Olivas in 2 weeks  - plan stent removal in 72 hrs    Day of Discharge     HPI:   Patient resting in bed. He complains of some mild hematuria, flank pain improved. No nausea/vomiting. No fever/chills. Feels ready to go home today.    Review of Systems  Gen- No fevers, chills  CV- No chest pain, palpitations  Resp- No cough, dyspnea  GI- No N/V/D, abd pain      Otherwise ROS is negative  except as mentioned in the HPI.    Vital Signs:   Temp:  [97.1 °F (36.2 °C)-98.4 °F (36.9 °C)] 98.4 °F (36.9 °C)  Heart Rate:  [71-92] 78  Resp:  [14-18] 18  BP: (116-145)/() 129/83     Physical Exam:  Constitutional: No acute distress, awake, alert  HENT: NCAT, mucous membranes moist  Respiratory: Clear to auscultation bilaterally, respiratory effort normal   Cardiovascular: RRR, no murmurs, rubs, or gallops, palpable pedal pulses bilaterally  Gastrointestinal: Positive bowel sounds, soft, nontender, nondistended  Musculoskeletal: No bilateral ankle edema  Psychiatric: Appropriate affect, cooperative  Neurologic: Oriented x 3, strength symmetric in all extremities, Cranial Nerves grossly intact to confrontation, speech clear  Skin: No rashes      Pertinent  and/or Most Recent Results     Results from last 7 days   Lab Units 04/13/19 0511 04/12/19 2027 04/12/19  0555 04/11/19  0109   WBC 10*3/mm3 6.39  --  11.76* 12.53*   HEMOGLOBIN g/dL 14.3  --  13.7 15.8   HEMATOCRIT % 41.4  --  40.4 45.0   PLATELETS 10*3/mm3 247  --  227 250   SODIUM mmol/L 135* 136 134* 138   POTASSIUM mmol/L 4.5 3.9  3.9 4.2 4.1   CHLORIDE mmol/L 99 101 101 101   CO2 mmol/L 23.0 24.0 23.0 24.0   BUN mg/dL 15 14 16 21*   CREATININE mg/dL 1.17 1.29* 1.27 1.65*   GLUCOSE mg/dL 153* 97 124* 109*   CALCIUM mg/dL 9.6 9.0 8.7 9.7     Results from last 7 days   Lab Units 04/12/19 2027 04/12/19  0555 04/11/19  0109   BILIRUBIN mg/dL 0.8 0.4 0.5   ALK PHOS U/L 40 47 68   ALT (SGPT) U/L 24 21 34   AST (SGOT) U/L 23 18 31   PROTIME Seconds 13.4  --   --    INR  1.07  --   --            Invalid input(s): TG, LDLCALC, LDLREALC        Brief Urine Lab Results  (Last result in the past 365 days)      Color   Clarity   Blood   Leuk Est   Nitrite   Protein   CREAT   Urine HCG        04/12/19 1211 Yellow Clear Negative Negative Negative Negative               Microbiology Results Abnormal     None          Imaging Results (all)     Procedure Component  Value Units Date/Time    FL C Arm During Surgery [[id number]] Collected:  04/12/19 2011     Updated:  04/13/19 1036    Narrative:       EXAMINATION: FL C ARM DURING SURGERY - 04/12/2019     INDICATION: R52-Pain, unspecified; N20.0-Calculus of kidney. Stent  placement.     COMPARISON: NONE     FINDINGS: 48 seconds of fluoroscopy and 1 images for left ureteral stent  placement. Please see the procedure performed for full details.        Impression:       Fluoroscopy for left ureteral stent placement.      DICTATED:   04/12/2019  EDITED/ls :   04/12/2019      This report was finalized on 4/13/2019 10:33 AM by Dr. Nathalie Michel MD.                       Results for orders placed in visit on 01/09/17   Adult Transthoracic Echo Complete With Contrast    Narrative · Left ventricular function is normal. Estimated EF = 60%.  · Left ventricular wall thickness is consistent with mild concentric   hypertrophy.  · Right ventricular cavity is borderline dilated.  · There is no evidence of pericardial effusion.  · No evidence of pulmonary hypertension is present.  · No significant structural valvular abnormality demonstrated.           Order Current Status    Stone Analysis - Calculus, Ureter, Left In process        Discharge Details        Discharge Medications      Continue These Medications      Instructions Start Date   amLODIPine 10 MG tablet  Commonly known as:  NORVASC   10 mg, Oral, Daily      aspirin 81 MG EC tablet   81 mg, Oral, Daily      fenofibrate 145 MG tablet  Commonly known as:  TRICOR   145 mg, Oral, Daily      flecainide 50 MG tablet  Commonly known as:  TAMBOCOR   TAKE 1 TABLET BY MOUTH TWICE A DAY      losartan 50 MG tablet  Commonly known as:  COZAAR   TAKE 1 TABLET BY MOUTH EVERY DAY      metoprolol tartrate 50 MG tablet  Commonly known as:  LOPRESSOR   TAKE 1 TABLET BY MOUTH TWICE A DAY      nitrofurantoin (macrocrystal-monohydrate) 100 MG capsule  Commonly known as:  MACROBID   100 mg, Oral, 2 Times  Daily      ondansetron ODT 4 MG disintegrating tablet  Commonly known as:  ZOFRAN-ODT   4 mg, Oral, 4 Times Daily PRN      oxyCODONE-acetaminophen 5-325 MG per tablet  Commonly known as:  PERCOCET   1 tablet, Oral, Every 8 Hours PRN      tamsulosin 0.4 MG capsule 24 hr capsule  Commonly known as:  FLOMAX   1 capsule, Oral, Nightly             No Known Allergies      Discharge Disposition:  Home or Self Care    Discharge Diet:  Diet Order   Procedures   • Diet Regular         Discharge Activity:   - as tolerated      CODE STATUS:    Code Status and Medical Interventions:   Ordered at: 04/12/19 0856     Level Of Support Discussed With:    Patient     Code Status:    CPR     Medical Interventions (Level of Support Prior to Arrest):    Full         Future Appointments   Date Time Provider Department Center   5/20/2019  9:00 AM Jacquelyn Akers APRN [facility] None           Time Spent on Discharge:  35 minutes    Electronically signed by Torie Taylor DO 04/13/19, 12:12 PM.

## 2019-04-13 NOTE — PROGRESS NOTES
Postop day 1 left ureteroscopy stone extraction and stent placement.  He has minimal discomfort.  He has had no nausea.  He feels he is ready for discharge.  He has Macrobid at home which he will continue.  He also has pain medication.  He will remove this stent in 72 hours and follow-up with Dr. Ludin Olivas his primary urologist in 2 weeks.

## 2019-04-13 NOTE — PLAN OF CARE
Problem: Patient Care Overview  Goal: Plan of Care Review  Outcome: Ongoing (interventions implemented as appropriate)   04/13/19 4348   OTHER   Outcome Summary VSS, pain well controlled with prn po pain medication, adequate urine output. Appears to be resting comfortably at this time.

## 2019-04-14 ENCOUNTER — READMISSION MANAGEMENT (OUTPATIENT)
Dept: CALL CENTER | Facility: HOSPITAL | Age: 44
End: 2019-04-14

## 2019-04-15 NOTE — OUTREACH NOTE
Prep Survey      Responses   Facility patient discharged from?  [facility]   Is patient eligible?  Yes   Discharge diagnosis  left ureroscopy, stone extraction, stent placement   Does the patient have one of the following disease processes/diagnoses(primary or secondary)?  Other   Does the patient have Home health ordered?  No   Is there a DME ordered?  No   Prep survey completed?  Yes          Alison Troncoso RN

## 2019-04-15 NOTE — PAYOR COMM NOTE
"Dede Brady (44 y.o. Male)     Date of Birth Social Security Number Address Home Phone MRN    1975  [address] [place] [address] [phone] [id number]    Orthodoxy Marital Status          None        Admission Date Admission Type Admitting Provider Attending Provider Department, Room/Bed    19 Emergency Torie Taylor DO  [facility] 5G, S558/1    Discharge Date Discharge Disposition Discharge Destination        2019 Home or Self Care              Attending Provider:  (none)   Allergies:  No Known Allergies    Isolation:  None   Infection:  None   Code Status:  Prior    Ht:  185.4 cm (73\")   Wt:  103 kg (228 lb)    Admission Cmt:  None   Principal Problem:  None                Active Insurance as of 2019     Primary Coverage     Payor Plan Insurance Group Employer/Plan Group    [id number]     Payor Plan Address Payor Plan Phone Number Payor Plan Fax Number Effective Dates    PO    3/7/2018 - None Entered    [facility] [id number]       Subscriber Name Subscriber Birth Date Member ID       DEDE BRADY 1975 [id number]                 Emergency Contacts      (Rel.) Home Phone Work Phone Mobile Phone    BradyRosalee del castillo (Spouse) [phone] -- --               Discharge Summary      Torie Taylor DO at 2019 12:12 PM              [facility]  DISCHARGE SUMMARY    Patient Name: Dede Brady  : 1975  MRN: [id number]    Date of Admission: 2019  Date of Discharge:  2019  Primary Care Physician: Heather Ibarra MD    Consults     Date and Time Order Name Status Description    2019 0856 Inpatient Urology Consult            Hospital Course     Presenting Problem:   Intractable pain [R52]  Nephrolithiasis [N20.0]  Intractable pain [R52]    Active Hospital Problems    Diagnosis  POA   • Intractable pain [R52]  Yes   • Nephrolithiasis " [N20.0]  Unknown   • PVC's (premature ventricular contractions) [I49.3]  Yes   • A-fib (CMS/HCC) [I48.91]  Yes   • HTN (hypertension) [I10]  Yes   • Hyperlipemia [E78.5]  Yes      Resolved Hospital Problems   No resolved problems to display.          Hospital Course:  Mikie Meléndez is a 44 y.o. male w/ PMH of HTN, PVCs on Flecainide, PAF on ASA and nephrolithiasis who presents with 4-5mm left nephrolithiasis with associated hydronephrosis failing outpatient treatment. Patient underwent left ureteral stone basket extraction and placement of left ureteral stent on 4/12/19 with Dr. White. Post-op he has done well. Labs are all stable. He has had some mild increase in his hematuria, but pain overall improved. He has been cleared by urology for discharge and will need stent removed in 72 hrs. He will continue macrobid and PRN pain medication. He will follow-up with Dr. Olivas in 2 weeks.      Discharge Follow Up Recommendations for labs/diagnostics:  - follow up with Dr. Olivas in 2 weeks  - plan stent removal in 72 hrs    Day of Discharge     HPI:   Patient resting in bed. He complains of some mild hematuria, flank pain improved. No nausea/vomiting. No fever/chills. Feels ready to go home today.    Review of Systems  Gen- No fevers, chills  CV- No chest pain, palpitations  Resp- No cough, dyspnea  GI- No N/V/D, abd pain      Otherwise ROS is negative except as mentioned in the HPI.    Vital Signs:   Temp:  [97.1 °F (36.2 °C)-98.4 °F (36.9 °C)] 98.4 °F (36.9 °C)  Heart Rate:  [71-92] 78  Resp:  [14-18] 18  BP: (116-145)/() 129/83     Physical Exam:  Constitutional: No acute distress, awake, alert  HENT: NCAT, mucous membranes moist  Respiratory: Clear to auscultation bilaterally, respiratory effort normal   Cardiovascular: RRR, no murmurs, rubs, or gallops, palpable pedal pulses bilaterally  Gastrointestinal: Positive bowel sounds, soft, nontender, nondistended  Musculoskeletal: No bilateral ankle  edema  Psychiatric: Appropriate affect, cooperative  Neurologic: Oriented x 3, strength symmetric in all extremities, Cranial Nerves grossly intact to confrontation, speech clear  Skin: No rashes      Pertinent  and/or Most Recent Results     Results from last 7 days   Lab Units 04/13/19 0511 04/12/19 2027 04/12/19  0555 04/11/19  0109   WBC 10*3/mm3 6.39  --  11.76* 12.53*   HEMOGLOBIN g/dL 14.3  --  13.7 15.8   HEMATOCRIT % 41.4  --  40.4 45.0   PLATELETS 10*3/mm3 247  --  227 250   SODIUM mmol/L 135* 136 134* 138   POTASSIUM mmol/L 4.5 3.9  3.9 4.2 4.1   CHLORIDE mmol/L 99 101 101 101   CO2 mmol/L 23.0 24.0 23.0 24.0   BUN mg/dL 15 14 16 21*   CREATININE mg/dL 1.17 1.29* 1.27 1.65*   GLUCOSE mg/dL 153* 97 124* 109*   CALCIUM mg/dL 9.6 9.0 8.7 9.7     Results from last 7 days   Lab Units 04/12/19 2027 04/12/19  0555 04/11/19  0109   BILIRUBIN mg/dL 0.8 0.4 0.5   ALK PHOS U/L 40 47 68   ALT (SGPT) U/L 24 21 34   AST (SGOT) U/L 23 18 31   PROTIME Seconds 13.4  --   --    INR  1.07  --   --            Invalid input(s): TG, LDLCALC, LDLREALC        Brief Urine Lab Results  (Last result in the past 365 days)      Color   Clarity   Blood   Leuk Est   Nitrite   Protein   CREAT   Urine HCG        04/12/19 1211 Yellow Clear Negative Negative Negative Negative               Microbiology Results Abnormal     None          Imaging Results (all)     Procedure Component Value Units Date/Time    FL C Arm During Surgery [[id number]] Collected:  04/12/19 2011     Updated:  04/13/19 1036    Narrative:       EXAMINATION: FL C ARM DURING SURGERY - 04/12/2019     INDICATION: R52-Pain, unspecified; N20.0-Calculus of kidney. Stent  placement.     COMPARISON: NONE     FINDINGS: 48 seconds of fluoroscopy and 1 images for left ureteral stent  placement. Please see the procedure performed for full details.        Impression:       Fluoroscopy for left ureteral stent placement.      DICTATED:   04/12/2019  EDITED/ls :   04/12/2019       This report was finalized on 4/13/2019 10:33 AM by Dr. Nathalie Michel MD.                       Results for orders placed in visit on 01/09/17   Adult Transthoracic Echo Complete With Contrast    Narrative · Left ventricular function is normal. Estimated EF = 60%.  · Left ventricular wall thickness is consistent with mild concentric   hypertrophy.  · Right ventricular cavity is borderline dilated.  · There is no evidence of pericardial effusion.  · No evidence of pulmonary hypertension is present.  · No significant structural valvular abnormality demonstrated.           Order Current Status    Stone Analysis - Calculus, Ureter, Left In process        Discharge Details        Discharge Medications      Continue These Medications      Instructions Start Date   amLODIPine 10 MG tablet  Commonly known as:  NORVASC   10 mg, Oral, Daily      aspirin 81 MG EC tablet   81 mg, Oral, Daily      fenofibrate 145 MG tablet  Commonly known as:  TRICOR   145 mg, Oral, Daily      flecainide 50 MG tablet  Commonly known as:  TAMBOCOR   TAKE 1 TABLET BY MOUTH TWICE A DAY      losartan 50 MG tablet  Commonly known as:  COZAAR   TAKE 1 TABLET BY MOUTH EVERY DAY      metoprolol tartrate 50 MG tablet  Commonly known as:  LOPRESSOR   TAKE 1 TABLET BY MOUTH TWICE A DAY      nitrofurantoin (macrocrystal-monohydrate) 100 MG capsule  Commonly known as:  MACROBID   100 mg, Oral, 2 Times Daily      ondansetron ODT 4 MG disintegrating tablet  Commonly known as:  ZOFRAN-ODT   4 mg, Oral, 4 Times Daily PRN      oxyCODONE-acetaminophen 5-325 MG per tablet  Commonly known as:  PERCOCET   1 tablet, Oral, Every 8 Hours PRN      tamsulosin 0.4 MG capsule 24 hr capsule  Commonly known as:  FLOMAX   1 capsule, Oral, Nightly             No Known Allergies      Discharge Disposition:  Home or Self Care    Discharge Diet:  Diet Order   Procedures   • Diet Regular         Discharge Activity:   - as tolerated      CODE STATUS:    Code Status and  Medical Interventions:   Ordered at: 04/12/19 0856     Level Of Support Discussed With:    Patient     Code Status:    CPR     Medical Interventions (Level of Support Prior to Arrest):    Full         Future Appointments   Date Time Provider Department Center   5/20/2019  9:00 AM Jacquelyn Akers APRN [facility] None           Time Spent on Discharge:  35 minutes    Electronically signed by Torie Taylor DO, 04/13/19, 12:12 PM.        Electronically signed by Torie Taylor DO at 4/13/2019 12:20 PM

## 2019-04-16 ENCOUNTER — READMISSION MANAGEMENT (OUTPATIENT)
Dept: CALL CENTER | Facility: HOSPITAL | Age: 44
End: 2019-04-16

## 2019-04-16 NOTE — OUTREACH NOTE
Medical Week 1 Survey      Responses   Facility patient discharged from?  [facility]   Does the patient have one of the following disease processes/diagnoses(primary or secondary)?  Other   Is there a successful TCM telephone encounter documented?  No   Week 1 attempt successful?  No   Unsuccessful attempts  Attempt 1          Candida Young RN

## 2019-04-18 LAB
CA PHOS CRY STONE QL IR: 10 %
COD CRY STONE QL IR: 2 %
COLOR STONE: NORMAL
COM CRY STONE QL IR: 88 %
COMPN STONE: NORMAL
CONV COMMENT: NORMAL
Lab: NORMAL
Lab: NORMAL
NIDUS STONE QL: NORMAL
PATH REPORT.COMMENTS IMP SPEC: NORMAL
SIZE STONE: NORMAL MM
SURFACE CRYSTALS: NORMAL
WT STONE: 38.5 MG

## 2019-04-19 ENCOUNTER — READMISSION MANAGEMENT (OUTPATIENT)
Dept: CALL CENTER | Facility: HOSPITAL | Age: 44
End: 2019-04-19

## 2019-04-19 NOTE — OUTREACH NOTE
Medical Week 1 Survey      Responses   Facility patient discharged from?  [place]   Does the patient have one of the following disease processes/diagnoses(primary or secondary)?  Other   Is there a successful TCM telephone encounter documented?  No   Week 1 attempt successful?  No   Unsuccessful attempts  Attempt 2          Mikie Mccracken RN

## 2019-04-22 ENCOUNTER — READMISSION MANAGEMENT (OUTPATIENT)
Dept: CALL CENTER | Facility: HOSPITAL | Age: 44
End: 2019-04-22

## 2019-04-22 NOTE — OUTREACH NOTE
Medical Week 2 Survey      Responses   Facility patient discharged from?  [facility]   Does the patient have one of the following disease processes/diagnoses(primary or secondary)?  Other   Week 2 attempt successful?  No   Unsuccessful attempts  Attempt 1          Roselia Acosta RN

## 2019-04-24 ENCOUNTER — READMISSION MANAGEMENT (OUTPATIENT)
Dept: CALL CENTER | Facility: HOSPITAL | Age: 44
End: 2019-04-24

## 2019-04-24 NOTE — OUTREACH NOTE
Medical Week 2 Survey      Responses   Facility patient discharged from?  [place]   Does the patient have one of the following disease processes/diagnoses(primary or secondary)?  Other   Week 2 attempt successful?  No   Unsuccessful attempts  Attempt 2          Tiff Rojas RN

## 2019-05-29 RX ORDER — AMLODIPINE BESYLATE 10 MG/1
10 TABLET ORAL DAILY
Qty: 30 TABLET | Refills: 3 | Status: SHIPPED | OUTPATIENT
Start: 2019-05-29 | End: 2019-07-25 | Stop reason: SDUPTHER

## 2019-07-23 RX ORDER — AMLODIPINE BESYLATE 10 MG/1
TABLET ORAL
Qty: 30 TABLET | Refills: 3 | OUTPATIENT
Start: 2019-07-23

## 2019-07-24 RX ORDER — LOSARTAN POTASSIUM 50 MG/1
50 TABLET ORAL DAILY
Qty: 30 TABLET | Refills: 5 | Status: SHIPPED | OUTPATIENT
Start: 2019-07-24 | End: 2020-02-04 | Stop reason: SDUPTHER

## 2019-07-25 ENCOUNTER — TELEPHONE (OUTPATIENT)
Dept: CARDIOLOGY | Facility: CLINIC | Age: 44
End: 2019-07-25

## 2019-07-25 RX ORDER — METOPROLOL TARTRATE 50 MG/1
50 TABLET, FILM COATED ORAL 2 TIMES DAILY
Qty: 60 TABLET | Refills: 6 | Status: SHIPPED | OUTPATIENT
Start: 2019-07-25 | End: 2019-10-08

## 2019-07-25 RX ORDER — AMLODIPINE BESYLATE 10 MG/1
10 TABLET ORAL DAILY
Qty: 30 TABLET | Refills: 3 | Status: SHIPPED | OUTPATIENT
Start: 2019-07-25 | End: 2019-12-15 | Stop reason: SDUPTHER

## 2019-10-08 ENCOUNTER — OFFICE VISIT (OUTPATIENT)
Dept: CARDIOLOGY | Facility: CLINIC | Age: 44
End: 2019-10-08

## 2019-10-08 VITALS
WEIGHT: 222 LBS | BODY MASS INDEX: 29.42 KG/M2 | HEART RATE: 78 BPM | SYSTOLIC BLOOD PRESSURE: 110 MMHG | OXYGEN SATURATION: 96 % | DIASTOLIC BLOOD PRESSURE: 76 MMHG | HEIGHT: 73 IN

## 2019-10-08 DIAGNOSIS — I49.3 PVC'S (PREMATURE VENTRICULAR CONTRACTIONS): ICD-10-CM

## 2019-10-08 DIAGNOSIS — I48.0 PAROXYSMAL ATRIAL FIBRILLATION (HCC): Primary | ICD-10-CM

## 2019-10-08 DIAGNOSIS — I10 ESSENTIAL HYPERTENSION: ICD-10-CM

## 2019-10-08 PROCEDURE — 99214 OFFICE O/P EST MOD 30 MIN: CPT | Performed by: PHYSICIAN ASSISTANT

## 2019-10-08 PROCEDURE — 93000 ELECTROCARDIOGRAM COMPLETE: CPT | Performed by: PHYSICIAN ASSISTANT

## 2019-10-08 NOTE — PROGRESS NOTES
Mikie Meléndez  1975  PCP: Heather Ibarra MD    SUBJECTIVE:   Mikie Meléndez is a 44 y.o. male seen for a follow up visit regarding the following:     Chief Complaint: Follow up for afib, PVCs     HPI:    Mr. Meléndez is a 44 y/o male with the above noted past medical history. He presents today for f/u on PAF s/p PVA 9/28/16. He wore a 30 day event monitor from 3/7-4/5/18 which demonstrated mostly NSR with PVCs and a couple short runs of PSVT both of which were associated with skipped beats, racing heart and dizziness. He was started on Flecainide and had improvement. He was then reporting increased palps that felt like afib. Event monitor in March 2019 showed runs of atach.     Problem List:  1. Paroxysmal Atrial Fibrillation   a. History of previous stress testing and Holter monitoring, data insufficient.   b. Sinus rhythm by EKG.   c. Holter monitor showing sinus rhythm. Rare PVCs. One short run of atrial tachycardia. No AV block or pauses.  d. Echocardiogram with a preliminary report, 12/29/2014. EF 55% to 60%. Left atrium at 3.7 cm.  e. Dec 31 st diagnosis of Afib with RVR. ECV at  on jan 1st with initiation of Xarelto; stopped and changed to Eliquis   f. Echocardiogram 5/19/2016: EF 40-45%. Mild MR. Mild physiologic TR present. ? Tachy induced CMP  g. Cardiac catheterization 6/2/2016: EF 65%. Normal left ventricular function. Geographically normal coronary arteries.  h. Echocardiogram 6/24/2016: EF 50%. No thrombus or mass detected in left atrium or left atrial appendage. Mild left atrial enlargement. Trace intermittent MR.  i. Event Monitor 5/24/2016-6/23/2016 showing runs of PVCs and PACs (half time PAC and other PVCs). No Afib. At times symptoms of palpitations, SOB correlated with ectopy and at other times NSR with no abnormalities.   j. Cardioversion in January and June 2016. Started on Flec 50 mg BID, Toprol and Eliquis  k. S/p PVA 9/28/16, SD  l. Event monitor from 3/7-4/5/18 demonstrated  "mostly NSR with PVCs and a couple short runs of PSVT both of which were associated with skipped beats, racing heart and dizziness, subsequently re-started on Flec.   2. Dyslipidemia.   3. Excessive caffeine intake.   4. Family history of tachycardia.     Current Outpatient Medications:   •  amLODIPine (NORVASC) 10 MG tablet, Take 1 tablet by mouth Daily. Make appt for further refills, Disp: 30 tablet, Rfl: 3  •  aspirin 81 MG EC tablet, Take 81 mg by mouth Daily., Disp: , Rfl:   •  fenofibrate (TRICOR) 145 MG tablet, Take 145 mg by mouth Daily., Disp: , Rfl: 2  •  flecainide (TAMBOCOR) 50 MG tablet, TAKE 1 TABLET BY MOUTH TWICE A DAY, Disp: 60 tablet, Rfl: 11  •  losartan (COZAAR) 50 MG tablet, Take 1 tablet by mouth Daily., Disp: 30 tablet, Rfl: 5  •  metoprolol tartrate (LOPRESSOR) 50 MG tablet, Take 1 tablet by mouth 2 (Two) Times a Day., Disp: 60 tablet, Rfl: 6    Past Medical History, Past Surgical History, Family history, Social History, and Medications were all reviewed with the patient today and updated as necessary.       Patient Active Problem List   Diagnosis   • A-fib (CMS/HCC)   • Dyslipidemia   • Cardiomyopathy (CMS/HCC)   • Precordial pain   • Palpitations   • Hyperlipemia   • Fatigue   • HTN (hypertension)   • Tachycardia   • PVC's (premature ventricular contractions)   • Paroxysmal atrial fibrillation (CMS/HCC)   • Hematoma   • PSVT (paroxysmal supraventricular tachycardia) (CMS/HCC)   • Intractable pain   • Nephrolithiasis     No Known Allergies  Past Medical History:   Diagnosis Date   • Excessive caffeine intake    • Hypertension    • Kidney calculi    • PAC (premature atrial contraction)    • PAF (paroxysmal atrial fibrillation) (CMS/HCC)     CHADS-VASc = 1   • PVC (premature ventricular contraction)      Past Surgical History:   Procedure Laterality Date   • APPENDECTOMY     • BACK SURGERY     • CARDIAC CATHETERIZATION  06/2016    \"NORMAL\" PER DR. KIMANI PHIPPS   • CARDIAC ELECTROPHYSIOLOGY " "PROCEDURE N/A 9/28/2016    Procedure: PVA. Stop Flecainide 5 days prior to the procedure. Stop Metoprolol 3 days prior to the procedure.;  Surgeon: Kai Mitchell DO;  Location:  [facility];  Service:    • CARDIOVERSION  06/24/2016   • CYSTOSCOPY W/ URETERAL STENT PLACEMENT Left 4/12/2019    Procedure: LEFT UTEROSCOPY, LEFT URETERAL BALLOON DIALATION, LEFT URETERAL STONE BASKET EXTRACTION, PLACEMENT OF LEFT URETERAL STENT 4.8;  Surgeon: Johnson White MD;  Location: [facility];  Service: Urology   • EXTRACORPOREAL SHOCK WAVE LITHOTRIPSY (ESWL)     • HERNIA REPAIR     • LASIK     • NASAL SEPTUM SURGERY       Family History   Problem Relation Age of Onset   • Other Other         tachycardia   • No Known Problems Mother    • No Known Problems Father    • Arrhythmia Sister      Social History     Tobacco Use   • Smoking status: Never Smoker   • Smokeless tobacco: Never Used   Substance Use Topics   • Alcohol use: No         PHYSICAL EXAM:    /76 (BP Location: Left arm, Patient Position: Sitting)   Pulse 78   Ht 185.4 cm (73\")   Wt 101 kg (222 lb)   SpO2 96%   BMI 29.29 kg/m²        Wt Readings from Last 5 Encounters:   10/08/19 101 kg (222 lb)   04/12/19 103 kg (228 lb)   04/11/19 103 kg (228 lb)   02/18/19 104 kg (228 lb 12.8 oz)   11/12/18 102 kg (224 lb 12.8 oz)       BP Readings from Last 5 Encounters:   10/08/19 110/76   04/13/19 129/83   04/11/19 135/88   02/18/19 110/78   11/12/18 112/78       General-Well Nourished, Well developed  Eyes - PERRLA  Neck- supple, No mass  CV- regular rate and rhythm, no MRG, No edema  Lung- clear bilaterally  Abd- soft, +BS  Musc/skel - Norm strength and range of motion  Skin- warm and dry  Neuro - Alert & Oriented x 3, appropriate mood.        Medical problems and test results were reviewed with the patient today.     No results found for this or any previous visit (from the past 672 hour(s)).      EKG: (EKG has been independently visualized by me " and summarized below)      ECG 12 Lead  Date/Time: 10/8/2019 2:47 PM  Performed by: Elisa Mo PA  Authorized by: Elisa Mo PA   Comparison: compared with previous ECG from 4/12/2019  Similar to previous ECG  Rhythm: sinus rhythm  Rate: normal  BPM: 75  QRS axis: normal    Clinical impression: normal ECG             ASSESSMENT and PLAN    1) Palpitations/PVCs/PSVT- event monitor 3/4-3/5/18 with mostly NSR with PVC's and a couple short runs of PSVT a/w skipped beats, racing heart rates.  At least 2 different morphologies but one most common. Started on Flecainide with improvement in symptoms. Continue Flecainide. Having some fatigue so instructed to decrease Metoprolol to 25mg BID.      2) PAF, s/p PVA 2016 without recurrence per two event monitors. Taken off A/C and AAD in the past. Recent event monitor in March 2019 w/ no afib and only brief atach. Continue with ASA.      3) HTN, controlled/improved   Continue amlodipine, losartan, metoprolol     4) Flecainide use- EKG reviewed. QRS stable.       Return in about 6 months (around 4/8/2020).        ROBERT Harper-C   Cardiology/Electrophysiology  10/8/2019  2:48 PM

## 2019-11-26 RX ORDER — FLECAINIDE ACETATE 50 MG/1
50 TABLET ORAL 2 TIMES DAILY
Qty: 60 TABLET | Refills: 6 | Status: SHIPPED | OUTPATIENT
Start: 2019-11-26 | End: 2020-07-14 | Stop reason: SDUPTHER

## 2019-12-17 RX ORDER — AMLODIPINE BESYLATE 10 MG/1
10 TABLET ORAL DAILY
Qty: 90 TABLET | Refills: 2 | Status: SHIPPED | OUTPATIENT
Start: 2019-12-17 | End: 2020-10-02 | Stop reason: SDUPTHER

## 2020-02-04 RX ORDER — LOSARTAN POTASSIUM 50 MG/1
50 TABLET ORAL DAILY
Qty: 90 TABLET | Refills: 1 | Status: SHIPPED | OUTPATIENT
Start: 2020-02-04 | End: 2020-03-06 | Stop reason: SDUPTHER

## 2020-03-06 RX ORDER — LOSARTAN POTASSIUM 50 MG/1
50 TABLET ORAL DAILY
Qty: 90 TABLET | Refills: 3 | Status: SHIPPED | OUTPATIENT
Start: 2020-03-06 | End: 2021-03-16 | Stop reason: SDUPTHER

## 2020-05-26 ENCOUNTER — OFFICE VISIT (OUTPATIENT)
Dept: CARDIOLOGY | Facility: CLINIC | Age: 45
End: 2020-05-26

## 2020-05-26 VITALS
BODY MASS INDEX: 30.27 KG/M2 | SYSTOLIC BLOOD PRESSURE: 112 MMHG | HEART RATE: 75 BPM | HEIGHT: 73 IN | DIASTOLIC BLOOD PRESSURE: 84 MMHG | WEIGHT: 228.4 LBS | OXYGEN SATURATION: 97 %

## 2020-05-26 DIAGNOSIS — Z79.899 LONG TERM CURRENT USE OF ANTIARRHYTHMIC MEDICAL THERAPY: ICD-10-CM

## 2020-05-26 DIAGNOSIS — I10 ESSENTIAL HYPERTENSION: ICD-10-CM

## 2020-05-26 DIAGNOSIS — I48.0 PAROXYSMAL ATRIAL FIBRILLATION (HCC): Primary | ICD-10-CM

## 2020-05-26 PROCEDURE — 93000 ELECTROCARDIOGRAM COMPLETE: CPT | Performed by: INTERNAL MEDICINE

## 2020-05-26 PROCEDURE — 99214 OFFICE O/P EST MOD 30 MIN: CPT | Performed by: INTERNAL MEDICINE

## 2020-05-26 NOTE — PROGRESS NOTES
Mikie Meléndez  1975  PCP: Heather Ibarra MD    SUBJECTIVE:   Mikie Meléndez is a 45 y.o. male seen for a follow up visit regarding the following:     Chief Complaint: Follow up for afib, PVCs     HPI:    He presents today for f/u on PAF s/p PVA 9/28/16. He wore a 30 day event monitor from 3/7-4/5/18 which demonstrated mostly NSR with PVCs and a couple short runs of PSVT both of which were associated with skipped beats, racing heart and dizziness. He was started on Flecainide and had improvement. He was then reporting increased palps that felt like afib. Event monitor in March 2019 showed runs of A. tach. Having Palp everyday. Last a few seconds at a time.     Problem List:  1. Paroxysmal Atrial Fibrillation   a. History of previous stress testing and Holter monitoring, data insufficient.   b. Sinus rhythm by EKG.   c. Holter monitor showing sinus rhythm. Rare PVCs. One short run of atrial tachycardia. No AV block or pauses.  d. Echocardiogram with a preliminary report, 12/29/2014. EF 55% to 60%. Left atrium at 3.7 cm.  e. Dec 31 st diagnosis of Afib with RVR. ECV at  on jan 1st with initiation of Xarelto; stopped and changed to Eliquis   f. Echocardiogram 5/19/2016: EF 40-45%. Mild MR. Mild physiologic TR present. ? Tachy induced CMP  g. Cardiac catheterization 6/2/2016: EF 65%. Normal left ventricular function. Geographically normal coronary arteries.  h. Echocardiogram 6/24/2016: EF 50%. No thrombus or mass detected in left atrium or left atrial appendage. Mild left atrial enlargement. Trace intermittent MR.  i. Event Monitor 5/24/2016-6/23/2016 showing runs of PVCs and PACs (half time PAC and other PVCs). No Afib. At times symptoms of palpitations, SOB correlated with ectopy and at other times NSR with no abnormalities.   j. Cardioversion in January and June 2016. Started on Flec 50 mg BID, Toprol and Eliquis  k. S/p PVA 9/28/16, SD  l. Event monitor from 3/7-4/5/18 demonstrated mostly NSR with PVCs  "and a couple short runs of PSVT both of which were associated with skipped beats, racing heart and dizziness, subsequently re-started on Flec.   2. Dyslipidemia.   3. Excessive caffeine intake.   4. Family history of tachycardia.     Current Outpatient Medications:   •  amLODIPine (NORVASC) 10 MG tablet, Take 1 tablet by mouth Daily., Disp: 90 tablet, Rfl: 2  •  aspirin 81 MG EC tablet, Take 81 mg by mouth Daily., Disp: , Rfl:   •  fenofibrate (TRICOR) 145 MG tablet, Take 145 mg by mouth Daily., Disp: , Rfl: 2  •  flecainide (TAMBOCOR) 50 MG tablet, Take 1 tablet by mouth 2 (Two) Times a Day., Disp: 60 tablet, Rfl: 6  •  losartan (COZAAR) 50 MG tablet, Take 1 tablet by mouth Daily., Disp: 90 tablet, Rfl: 3  •  metoprolol tartrate (LOPRESSOR) 25 MG tablet, Take 1 tablet by mouth 2 (Two) Times a Day., Disp: 60 tablet, Rfl: 11    Past Medical History, Past Surgical History, Family history, Social History, and Medications were all reviewed with the patient today and updated as necessary.       Patient Active Problem List   Diagnosis   • A-fib (CMS/HCC)   • Dyslipidemia   • Cardiomyopathy (CMS/HCC)   • Precordial pain   • Palpitations   • Hyperlipemia   • Fatigue   • HTN (hypertension)   • Tachycardia   • PVC's (premature ventricular contractions)   • Paroxysmal atrial fibrillation (CMS/HCC)   • Hematoma   • PSVT (paroxysmal supraventricular tachycardia) (CMS/HCC)   • Intractable pain   • Nephrolithiasis   • Long term current use of antiarrhythmic medical therapy     No Known Allergies  Past Medical History:   Diagnosis Date   • Excessive caffeine intake    • Hypertension    • Kidney calculi    • PAC (premature atrial contraction)    • PAF (paroxysmal atrial fibrillation) (CMS/HCC)     CHADS-VASc = 1   • PVC (premature ventricular contraction)      Past Surgical History:   Procedure Laterality Date   • APPENDECTOMY     • BACK SURGERY     • CARDIAC CATHETERIZATION  06/2016    \"NORMAL\" PER DR. KIMANI PHIPPS   • CARDIAC " "ELECTROPHYSIOLOGY PROCEDURE N/A 9/28/2016    Procedure: PVA. Stop Flecainide 5 days prior to the procedure. Stop Metoprolol 3 days prior to the procedure.;  Surgeon: Kai Mitchell DO;  Location:  [facility];  Service:    • CARDIOVERSION  06/24/2016   • CYSTOSCOPY W/ URETERAL STENT PLACEMENT Left 4/12/2019    Procedure: LEFT UTEROSCOPY, LEFT URETERAL BALLOON DIALATION, LEFT URETERAL STONE BASKET EXTRACTION, PLACEMENT OF LEFT URETERAL STENT 4.8;  Surgeon: Johnson White MD;  Location: [facility] OR;  Service: Urology   • EXTRACORPOREAL SHOCK WAVE LITHOTRIPSY (ESWL)     • HERNIA REPAIR     • LASIK     • NASAL SEPTUM SURGERY       Family History   Problem Relation Age of Onset   • Other Other         tachycardia   • No Known Problems Mother    • No Known Problems Father    • Arrhythmia Sister    • No Known Problems Sister      Social History     Tobacco Use   • Smoking status: Never Smoker   • Smokeless tobacco: Never Used   Substance Use Topics   • Alcohol use: No         PHYSICAL EXAM:    /84 (BP Location: Left arm, Patient Position: Sitting)   Pulse 75   Ht 185.4 cm (73\")   Wt 104 kg (228 lb 6.4 oz)   SpO2 97%   BMI 30.13 kg/m²        Wt Readings from Last 5 Encounters:   05/26/20 104 kg (228 lb 6.4 oz)   10/08/19 101 kg (222 lb)   04/12/19 103 kg (228 lb)   04/11/19 103 kg (228 lb)   02/18/19 104 kg (228 lb 12.8 oz)       BP Readings from Last 5 Encounters:   05/26/20 112/84   10/08/19 110/76   04/13/19 129/83   04/11/19 135/88   02/18/19 110/78       General-Well Nourished, Well developed  Eyes - PERRLA  Neck- supple, No mass  CV- regular rate and rhythm, no MRG, No edema  Lung- clear bilaterally  Abd- soft, +BS  Musc/skel - Norm strength and range of motion  Skin- warm and dry  Neuro - Alert & Oriented x 3, appropriate mood.        Medical problems and test results were reviewed with the patient today.     No results found for this or any previous visit (from the past 672 " hour(s)).      EKG: (EKG has been independently visualized by me and summarized below)      ECG 12 Lead  Date/Time: 5/26/2020 10:10 AM  Performed by: Lauro Rossi MD  Authorized by: Lauro Rossi MD   Comparison: compared with previous ECG   Similar to previous ECG  Rhythm: sinus rhythm  Rate: normal  BPM: 75  ST Segments: ST segments normal  T Waves: T waves normal    Clinical impression: normal ECG             ASSESSMENT and PLAN    1) Palpitations/PVCs/PSVT- event monitor 3/4-3/5/18 with mostly NSR with PVC's and a couple short runs of PSVT a/w skipped beats, racing heart rates.  At least 2 different morphologies but one most common. Started on Flecainide with improvement in symptoms. Continue Flecainide. Had some fatigue so he decreased Metoprolol to 25mg BID. Has mild sleep apnea. If palp become worse will increase Flec to 100mg BID     2) PAF, s/p PVA 2016 without recurrence per two event monitors. Taken off A/C and AAD in the past. Recent event monitor in March 2019 w/ no afib and only brief atach. Continue with ASA.      3) HTN, controlled/improved - Continue amlodipine, losartan, metoprolol     4) Flecainide use- EKG reviewed. QRS stable.       Return in about 6 months (around 11/26/2020).      Lauro Rossi M.D., F.A.C.C, F.H.R.S.  Cardiology/Electrophysiology  5/26/2020  10:14

## 2020-07-14 RX ORDER — FLECAINIDE ACETATE 50 MG/1
50 TABLET ORAL 2 TIMES DAILY
Qty: 60 TABLET | Refills: 6 | Status: SHIPPED | OUTPATIENT
Start: 2020-07-14 | End: 2021-01-27

## 2020-10-04 RX ORDER — AMLODIPINE BESYLATE 10 MG/1
10 TABLET ORAL DAILY
Qty: 90 TABLET | Refills: 3 | Status: SHIPPED | OUTPATIENT
Start: 2020-10-04 | End: 2021-09-28 | Stop reason: SDUPTHER

## 2021-01-01 NOTE — ANESTHESIA POSTPROCEDURE EVALUATION
Patient: Mikie Meléndez    Procedure Summary     Date:  04/12/19 Room / Location:   [facility] /  [facility]    Anesthesia Start:  1805 Anesthesia Stop:  1849    Procedure:  LEFT UTEROSCOPY, LEFT URETERAL BALLOON DIALATION, LEFT URETERAL STONE BASKET EXTRACTION, PLACEMENT OF LEFT URETERAL STENT 4.8 (Left Bladder) Diagnosis:      Surgeon:  Johnson White MD Provider:  Guru Song MD    Anesthesia Type:  general ASA Status:  2          Anesthesia Type: general  Last vitals  BP   140/92 (04/12/19 1846)   Temp   97.2 °F (36.2 °C) (04/12/19 1846)   Pulse   85 (04/12/19 1846)   Resp   16 (04/12/19 1846)     SpO2   96 % (04/12/19 1846)     Post Anesthesia Care and Evaluation    Patient location during evaluation: PACU  Patient participation: complete - patient participated  Level of consciousness: awake and alert  Pain score: 0  Pain management: adequate  Airway patency: patent  Anesthetic complications: No anesthetic complications  PONV Status: none  Cardiovascular status: hemodynamically stable and acceptable  Respiratory status: nonlabored ventilation, acceptable and nasal cannula  Hydration status: acceptable       Adequate: hears normal conversation without difficulty

## 2021-01-27 RX ORDER — FLECAINIDE ACETATE 50 MG/1
TABLET ORAL
Qty: 60 TABLET | Refills: 0 | Status: SHIPPED | OUTPATIENT
Start: 2021-01-27 | End: 2021-03-08

## 2021-03-08 ENCOUNTER — OFFICE VISIT (OUTPATIENT)
Dept: CARDIOLOGY | Facility: CLINIC | Age: 46
End: 2021-03-08

## 2021-03-08 VITALS
HEIGHT: 73 IN | HEART RATE: 78 BPM | OXYGEN SATURATION: 99 % | BODY MASS INDEX: 30.09 KG/M2 | WEIGHT: 227 LBS | DIASTOLIC BLOOD PRESSURE: 68 MMHG | SYSTOLIC BLOOD PRESSURE: 116 MMHG

## 2021-03-08 DIAGNOSIS — I47.1 PSVT (PAROXYSMAL SUPRAVENTRICULAR TACHYCARDIA) (HCC): ICD-10-CM

## 2021-03-08 DIAGNOSIS — I49.3 PVC'S (PREMATURE VENTRICULAR CONTRACTIONS): ICD-10-CM

## 2021-03-08 DIAGNOSIS — I10 ESSENTIAL HYPERTENSION: ICD-10-CM

## 2021-03-08 DIAGNOSIS — Z79.899 LONG TERM CURRENT USE OF ANTIARRHYTHMIC MEDICAL THERAPY: Primary | ICD-10-CM

## 2021-03-08 DIAGNOSIS — I48.0 PAROXYSMAL ATRIAL FIBRILLATION (HCC): ICD-10-CM

## 2021-03-08 PROCEDURE — 99214 OFFICE O/P EST MOD 30 MIN: CPT | Performed by: PHYSICIAN ASSISTANT

## 2021-03-08 PROCEDURE — 93000 ELECTROCARDIOGRAM COMPLETE: CPT | Performed by: PHYSICIAN ASSISTANT

## 2021-03-08 RX ORDER — TAMSULOSIN HYDROCHLORIDE 0.4 MG/1
1 CAPSULE ORAL
COMMUNITY
Start: 2020-12-17 | End: 2021-03-08

## 2021-03-08 RX ORDER — ATORVASTATIN CALCIUM 40 MG/1
40 TABLET, FILM COATED ORAL
COMMUNITY
Start: 2021-02-12

## 2021-03-08 RX ORDER — FLECAINIDE ACETATE 50 MG/1
TABLET ORAL
Qty: 60 TABLET | Refills: 5 | Status: SHIPPED | OUTPATIENT
Start: 2021-03-08 | End: 2021-09-01

## 2021-03-08 RX ORDER — MELOXICAM 15 MG/1
15 TABLET ORAL DAILY PRN
COMMUNITY
Start: 2020-12-17 | End: 2021-03-08

## 2021-03-08 NOTE — PROGRESS NOTES
Mikie Meléndez  1975  PCP: Heather Ibarra MD    SUBJECTIVE:   Mikie Meléndez is a 46 y.o. male seen for a follow up visit regarding the following:     Chief Complaint: Follow up for PAF, PVCs, PSVT     HPI:    Patient presents today for f/u on PAF s/p PVA 9/28/16. He has worn two event monitors since his his ablation and has not had any recurrent afib. Most recent was in march 2019 that showed runs of atach. He was started on Flecainide therapy with improvement. He reports he had an episode of palpitations and tachycardia about one week ago that was concerning for afib. They lasted about 25-30 minutes and his HR was over 100. He has otherwise been doing well. No chest pain, sob, edema, palps.      Problem List:      1. Paroxysmal Atrial Fibrillation   a. History of previous stress testing and Holter monitoring, data insufficient.   b. Sinus rhythm by EKG.   c. Holter monitor showing sinus rhythm. Rare PVCs. One short run of atrial tachycardia. No AV block or pauses.  d. Echocardiogram with a preliminary report, 12/29/2014. EF 55% to 60%. Left atrium at 3.7 cm.  e. Dec 31 st diagnosis of Afib with RVR. ECV at  on jan 1st with initiation of Xarelto; stopped and changed to Eliquis   f. Echocardiogram 5/19/2016: EF 40-45%. Mild MR. Mild physiologic TR present. ? Tachy induced CMP  g. Cardiac catheterization 6/2/2016: EF 65%. Normal left ventricular function. Geographically normal coronary arteries.  h. Echocardiogram 6/24/2016: EF 50%. No thrombus or mass detected in left atrium or left atrial appendage. Mild left atrial enlargement. Trace intermittent MR.  i. Event Monitor 5/24/2016-6/23/2016 showing runs of PVCs and PACs (half time PAC and other PVCs). No Afib. At times symptoms of palpitations, SOB correlated with ectopy and at other times NSR with no abnormalities.   j. Cardioversion in January and June 2016. Started on Flec 50 mg BID, Toprol and Eliquis  k. S/p PVA 9/28/16, SD  l. Event monitor from  3/7-4/5/18 demonstrated mostly NSR with PVCs and a couple short runs of PSVT both of which were associated with skipped beats, racing heart and dizziness, subsequently re-started on Flec.   2. Dyslipidemia.   3. Excessive caffeine intake.   4. Family history of tachycardia      Current Outpatient Medications:   •  amLODIPine (NORVASC) 10 MG tablet, Take 1 tablet by mouth Daily., Disp: 90 tablet, Rfl: 3  •  aspirin 81 MG EC tablet, Take 81 mg by mouth Daily., Disp: , Rfl:   •  atorvastatin (LIPITOR) 40 MG tablet, Take 40 mg by mouth every night at bedtime., Disp: , Rfl:   •  flecainide (TAMBOCOR) 50 MG tablet, TAKE 1 TABLET BY MOUTH TWICE A DAY, Disp: 60 tablet, Rfl: 5  •  losartan (COZAAR) 50 MG tablet, Take 1 tablet by mouth Daily., Disp: 90 tablet, Rfl: 3  •  metoprolol tartrate (LOPRESSOR) 25 MG tablet, Take 1 tablet by mouth 2 (Two) Times a Day., Disp: 60 tablet, Rfl: 6    Past Medical History, Past Surgical History, Family history, Social History, and Medications were all reviewed with the patient today and updated as necessary.       Patient Active Problem List   Diagnosis   • A-fib (CMS/HCC)   • Dyslipidemia   • Cardiomyopathy (CMS/HCC)   • Precordial pain   • Palpitations   • Hyperlipemia   • Fatigue   • HTN (hypertension)   • Tachycardia   • PVC's (premature ventricular contractions)   • Paroxysmal atrial fibrillation (CMS/HCC)   • Hematoma   • PSVT (paroxysmal supraventricular tachycardia) (CMS/HCC)   • Intractable pain   • Nephrolithiasis   • Long term current use of antiarrhythmic medical therapy     No Known Allergies  Past Medical History:   Diagnosis Date   • Excessive caffeine intake    • Hypertension    • Kidney calculi    • PAC (premature atrial contraction)    • PAF (paroxysmal atrial fibrillation) (CMS/HCC)     CHADS-VASc = 1   • PVC (premature ventricular contraction)      Past Surgical History:   Procedure Laterality Date   • APPENDECTOMY     • BACK SURGERY     • CARDIAC CATHETERIZATION   "06/2016    \"NORMAL\" PER DR. KIMANI PHIPPS   • CARDIAC ELECTROPHYSIOLOGY PROCEDURE N/A 9/28/2016    Procedure: PVA. Stop Flecainide 5 days prior to the procedure. Stop Metoprolol 3 days prior to the procedure.;  Surgeon: Kai Mitchell DO;  Location:  [facility];  Service:    • CARDIOVERSION  06/24/2016   • CYSTOSCOPY W/ URETERAL STENT PLACEMENT Left 4/12/2019    Procedure: LEFT UTEROSCOPY, LEFT URETERAL BALLOON DIALATION, LEFT URETERAL STONE BASKET EXTRACTION, PLACEMENT OF LEFT URETERAL STENT 4.8;  Surgeon: Johnson White MD;  Location: [facility] OR;  Service: Urology   • EXTRACORPOREAL SHOCK WAVE LITHOTRIPSY (ESWL)     • HERNIA REPAIR     • LASIK     • NASAL SEPTUM SURGERY       Family History   Problem Relation Age of Onset   • Other Other         tachycardia   • No Known Problems Mother    • No Known Problems Father    • Arrhythmia Sister    • No Known Problems Sister      Social History     Tobacco Use   • Smoking status: Never Smoker   • Smokeless tobacco: Never Used   Substance Use Topics   • Alcohol use: No         PHYSICAL EXAM:    /68   Pulse 78   Ht 185.4 cm (72.99\")   Wt 103 kg (227 lb)   SpO2 99%   BMI 29.96 kg/m²        Wt Readings from Last 5 Encounters:   03/08/21 103 kg (227 lb)   05/26/20 104 kg (228 lb 6.4 oz)   10/08/19 101 kg (222 lb)   04/12/19 103 kg (228 lb)   04/11/19 103 kg (228 lb)       BP Readings from Last 5 Encounters:   03/08/21 116/68   05/26/20 112/84   10/08/19 110/76   04/13/19 129/83   04/11/19 135/88       General-Well Nourished, Well developed  Eyes - PERRLA  Neck- supple, No mass  CV- regular rate and rhythm, no MRG, No edema  Lung- clear bilaterally  Abd- soft, +BS  Musc/skel - Norm strength and range of motion  Skin- warm and dry  Neuro - Alert & Oriented x 3, appropriate mood.        Medical problems and test results were reviewed with the patient today.     No results found for this or any previous visit (from the past 672 hour(s)).      EKG: " (EKG has been independently visualized by me and summarized below)      ECG 12 Lead    Date/Time: 3/8/2021 9:05 AM  Performed by: Elisa Mo PA  Authorized by: Elisa Mo PA   Comparison: compared with previous ECG from 5/26/2020  Similar to previous ECG  Rhythm: sinus rhythm  Rate: normal  BPM: 79  QRS axis: normal    Clinical impression: normal ECG             ASSESSMENT and PLAN    1) Palpitations/PVCs/PSVT- event monitor 3/4-3/5/18 with mostly NSR with PVC's and a couple short runs of PSVT a/w skipped beats, racing heart rates.  At least 2 different morphologies but one most common. Started on Flecainide with improvement in symptoms. Continue Flecainide 50mg BID. Can increase if worsening symptoms.      2) PAF, s/p PVA 2016 without recurrence per two event monitors. Taken off A/C and AAD in the past. Recent event monitor in March 2019 w/ no afib and only brief atach. Patient reporting episode of tachycardia that lasted about 25-30 minutes, concerning for afib. Will place Zio patch to better define. Have provided Eliquis samples as pill-in-pocket. Continue ASA for now.      3) HTN, controlled. Continue Amlodipine, Losartan, Metoprolol.      4) Flecainide use- EKG reviewed today. QRS is stable.       Return in about 3 months (around 6/8/2021).    Event monitor- will call with results     Elisa Mo PA-C   Cardiology/Electrophysiology  3/8/2021  09:24 EST

## 2021-03-16 RX ORDER — LOSARTAN POTASSIUM 50 MG/1
50 TABLET ORAL DAILY
Qty: 90 TABLET | Refills: 3 | Status: SHIPPED | OUTPATIENT
Start: 2021-03-16 | End: 2022-02-14

## 2021-04-14 ENCOUNTER — TELEPHONE (OUTPATIENT)
Dept: CARDIOLOGY | Facility: CLINIC | Age: 46
End: 2021-04-14

## 2021-04-14 NOTE — TELEPHONE ENCOUNTER
Pt returned call and he is aware of his monitor results and to continue current medical therapy.

## 2021-04-14 NOTE — PROGRESS NOTES
Attempted to call patient regarding monitor results and left VM. He had no recurrence of afib. Will continue current medical therapy.

## 2021-04-19 ENCOUNTER — TELEPHONE (OUTPATIENT)
Dept: CARDIOLOGY | Facility: CLINIC | Age: 46
End: 2021-04-19

## 2021-04-19 NOTE — TELEPHONE ENCOUNTER
Patient's wife called and left a message. I tried to call her back. No answer. I did not get the option to leave a message.

## 2021-08-14 NOTE — DISCHARGE INSTRUCTIONS
A closure device system was utilized/deployed for pre-closure of the right femoral vein access site using a Device Prcls Prostyle 6fr Sut Mediate Knot Push.  Rest.     Restart your blood pressure medication metoprolol at 50 mg a day.  25 mg was given today in the ED.    Take your blood pressure twice a day.  Bring the log to your primary care provider on Friday for discussion of antihypertensive treatment.    Push fluids.    No fatty or spicy food. Consume a bland diet.     Follow up with your doctor Friday as previously planned or more promptly if you develop worsening symptoms.    Follow up with Dr. Hawkins, Gastroenterology in the office. Call tomorrow for appointment.  All of her urine or bowel discomfort as well as your    Follow up with your urologist for further evaluation. Call tomorrow for appointment.    Immediately return to the ED for any concerns or worsening symptoms such as fevers or worsening pain including worsening pain or any other emergent acute or progressive symptoms as discussed.     Please review the medications you are supposed to be taking according to prior physician recommendations. I have not changed your home medications during this visit. If your discharge instructions indicate that I have changed your home medications, this is not the case, and you should disregard. If you have any questions about the medication you should be taking at home, please call your physician.

## 2021-08-27 ENCOUNTER — TELEPHONE (OUTPATIENT)
Dept: CARDIOLOGY | Facility: CLINIC | Age: 46
End: 2021-08-27

## 2021-08-27 DIAGNOSIS — I48.0 PAROXYSMAL ATRIAL FIBRILLATION (HCC): Primary | ICD-10-CM

## 2021-08-27 DIAGNOSIS — I49.3 PVC'S (PREMATURE VENTRICULAR CONTRACTIONS): ICD-10-CM

## 2021-08-27 DIAGNOSIS — I47.1 PSVT (PAROXYSMAL SUPRAVENTRICULAR TACHYCARDIA) (HCC): ICD-10-CM

## 2021-08-27 NOTE — TELEPHONE ENCOUNTER
Insurance requires a 24 or 48 hour holter monitor instead of 2 weeks. Will change order and notify patient.

## 2021-08-27 NOTE — TELEPHONE ENCOUNTER
Patients wife left a message. She reports that the patient feels that he has been having some a-fib (previously hasn't had any since his ablation), fatigue, LE swelling at times.      Per Red Wheeler VO:  2 week holter    I called the patients wife back. She states the patient began having a-fib episodes on Tuesday a few times a days lasting up to 2 hours each time. HR controlled in the 80s with episodes. /98 HR 84. Patients LE swelling started about a month ago. Fatigue has been going on for the last 3 or so months. He has not been taking Eliquis because it is . I will send in a prescription to the local pharmacy today. I gave instructions to take this until he hears back from us.

## 2021-08-27 NOTE — TELEPHONE ENCOUNTER
PA submitted via [facility] PA paperwork faxed. Wife was told they could come  samples.    Also, submitted via covermymeds (earlier the pharmacist said she didn't know how to do that)  Key: [id number]

## 2021-09-01 RX ORDER — FLECAINIDE ACETATE 50 MG/1
TABLET ORAL
Qty: 180 TABLET | Refills: 3 | Status: SHIPPED | OUTPATIENT
Start: 2021-09-01 | End: 2021-09-15 | Stop reason: SDUPTHER

## 2021-09-15 ENCOUNTER — TELEPHONE (OUTPATIENT)
Dept: CARDIOLOGY | Facility: CLINIC | Age: 46
End: 2021-09-15

## 2021-09-15 RX ORDER — FLECAINIDE ACETATE 50 MG/1
75 TABLET ORAL 2 TIMES DAILY
Qty: 90 TABLET | Refills: 5 | Status: SHIPPED | OUTPATIENT
Start: 2021-09-15 | End: 2021-09-23 | Stop reason: SDUPTHER

## 2021-09-15 NOTE — TELEPHONE ENCOUNTER
Goran Wheeler PA Nolan, Katie H, RN  Reviewed Monitor, would increase Flecainide to 75mg BID follow up EKG 48 hours. No Afib, but brief runs of Atrial tachycardia, better treated with medication.           Left voicemail for patient.

## 2021-09-23 ENCOUNTER — OFFICE VISIT (OUTPATIENT)
Dept: CARDIOLOGY | Facility: HOSPITAL | Age: 46
End: 2021-09-23

## 2021-09-23 VITALS
HEIGHT: 73 IN | SYSTOLIC BLOOD PRESSURE: 115 MMHG | DIASTOLIC BLOOD PRESSURE: 78 MMHG | OXYGEN SATURATION: 97 % | HEART RATE: 75 BPM | TEMPERATURE: 98.2 F | WEIGHT: 216.13 LBS | RESPIRATION RATE: 18 BRPM | BODY MASS INDEX: 28.64 KG/M2

## 2021-09-23 DIAGNOSIS — I10 ESSENTIAL HYPERTENSION: ICD-10-CM

## 2021-09-23 DIAGNOSIS — I48.0 PAROXYSMAL ATRIAL FIBRILLATION (HCC): ICD-10-CM

## 2021-09-23 DIAGNOSIS — Z86.79 HISTORY OF CARDIOMYOPATHY: ICD-10-CM

## 2021-09-23 DIAGNOSIS — I47.1 PSVT (PAROXYSMAL SUPRAVENTRICULAR TACHYCARDIA) (HCC): Primary | ICD-10-CM

## 2021-09-23 PROCEDURE — 99214 OFFICE O/P EST MOD 30 MIN: CPT | Performed by: NURSE PRACTITIONER

## 2021-09-23 RX ORDER — FLECAINIDE ACETATE 50 MG/1
75 TABLET ORAL 2 TIMES DAILY
Qty: 90 TABLET | Refills: 5
Start: 2021-09-23 | End: 2022-03-16

## 2021-09-23 NOTE — PROGRESS NOTES
"[facility], [facility]    Chief Complaint  Palpitations    Subjective    History of Present Illness {CC  Problem List  Visit  Diagnosis   Encounters  Notes  Medications  Labs  Result Review Imaging  Media :23}     Mikie Meléndez presents to [facility] CARDIOLOGY for   History of Present Illness     46-year-old male with PAF, PVCs, PSVT.  Status post PVA 9/28/2016.  History of continued palpitations since ablation.  Noted atrial tachycardia with initiation of flecainide.    Last Holter placed on 8/31/2021.  Duration 48 hours.  No sustained atrial fibrillation.  Short runs of atrial atrial tach.    On 9/15/2021 flecainide was increased to 75 bpm.    Patient with hypertension treated with losartan and amlodipine.  Patient on metoprolol tartrate.    Pt tells me today he has not increased his Flecainide.      Reports recent lower extremity edema and fatigue.  Hx of CM, last EF normal (2017).  No CP, pressure, dyspnea, dizziness, syncope.        Objective     Vital Signs:   Vitals:    09/23/21 1402 09/23/21 1404 09/23/21 1405   BP: 122/86 119/87 115/78   BP Location: Right arm Left arm Left arm   Patient Position: Sitting Standing Sitting   Cuff Size: Adult Adult Adult   Pulse: 76 78 75   Resp:   18   Temp:   98.2 °F (36.8 °C)   TempSrc:   Temporal   SpO2: 96% 96% 97%   Weight:   98 kg (216 lb 2 oz)   Height:   185.4 cm (72.99\")     Body mass index is 28.52 kg/m².  Physical Exam  Vitals reviewed.   Constitutional:       General: He is not in acute distress.     Appearance: Normal appearance.   Cardiovascular:      Rate and Rhythm: Normal rate and regular rhythm.      Pulses:           Radial pulses are 2+ on the right side.        Dorsalis pedis pulses are 2+ on the right side.        Posterior tibial pulses are 2+ on the right side.      Heart sounds: Normal heart sounds.   Pulmonary:      Effort: Pulmonary effort is normal.      Breath sounds: " Normal breath sounds.   Abdominal:      Palpations: Abdomen is soft.      Tenderness: There is no abdominal tenderness.   Musculoskeletal:      Right lower leg: No edema.      Left lower leg: No edema.   Skin:     General: Skin is warm and dry.   Neurological:      Mental Status: He is alert.   Psychiatric:         Mood and Affect: Mood normal.         Behavior: Behavior is cooperative.              Result Review  Data Reviewed:{ Labs  Result Review  Imaging  Med Tab  Media :23}     Lab Results   Component Value Date    WBC 6.39 04/13/2019    HGB 14.3 04/13/2019    HCT 41.4 04/13/2019    MCV 90.8 04/13/2019     04/13/2019     Lab Results   Component Value Date    GLUCOSE 153 (H) 04/13/2019    CALCIUM 9.6 04/13/2019     (L) 04/13/2019    K 4.5 04/13/2019    CO2 23.0 04/13/2019    CL 99 04/13/2019    BUN 15 04/13/2019    CREATININE 1.17 04/13/2019    EGFRIFNONA 67 04/13/2019    BCR 12.8 04/13/2019    ANIONGAP 13.0 04/13/2019     Lab Results   Component Value Date    TSH 0.482 05/16/2016     Lab Results   Component Value Date    CHLPL 216 (H) 06/01/2016     Lab Results   Component Value Date    TRIG 275 (H) 06/01/2016     Lab Results   Component Value Date    HDL 34 (L) 06/01/2016     Lab Results   Component Value Date     06/01/2016       Assessment and Plan {CC Problem List  Visit Diagnosis  ROS  Review (Popup)  Health Maintenance  Quality  BestPractice  Medications  SmartSets  SnapShot Encounters  Media :23}   1. PSVT (paroxysmal supraventricular tachycardia) (HCC)  Increase Flecainide 75 mg BID as ordered (start on 09/25/21), repeat EKG 09/27/21)  - ECG 12 Lead; Future    2. Paroxysmal atrial fibrillation (HCC)  No known recurrence of Afib since PVA.    - ECG 12 Lead; Future    3. Essential hypertension  Stable.     History of cardiomyopathy  Schedule echo      F/u with C as directed. .         Follow Up {Instructions Charge Capture  Follow-up Communications :23}   No  follow-ups on file.    Patient was given instructions and counseling regarding his condition or for health maintenance advice. Please see specific information pulled into the AVS if appropriate.  Patient was instructed to call the [facility] with any questions, concerns, or worsening symptoms.    *Please note that portions of this note were completed with a voice recognition program. Efforts were made to edit the dictations, but occasionally words are mistranscribed.

## 2021-09-28 RX ORDER — AMLODIPINE BESYLATE 10 MG/1
10 TABLET ORAL DAILY
Qty: 90 TABLET | Refills: 3 | Status: SHIPPED | OUTPATIENT
Start: 2021-09-28 | End: 2022-05-03

## 2021-10-13 ENCOUNTER — HOSPITAL ENCOUNTER (OUTPATIENT)
Dept: CARDIOLOGY | Facility: HOSPITAL | Age: 46
Discharge: HOME | End: 2021-10-13
Admitting: NURSE PRACTITIONER

## 2021-10-13 VITALS — BODY MASS INDEX: 28.51 KG/M2 | WEIGHT: 216.05 LBS

## 2021-10-13 DIAGNOSIS — I48.0 PAROXYSMAL ATRIAL FIBRILLATION (HCC): ICD-10-CM

## 2021-10-13 DIAGNOSIS — I47.1 PSVT (PAROXYSMAL SUPRAVENTRICULAR TACHYCARDIA) (HCC): ICD-10-CM

## 2021-10-13 DIAGNOSIS — Z86.79 HISTORY OF CARDIOMYOPATHY: ICD-10-CM

## 2021-10-13 LAB
ASCENDING AORTA: 3.3 CM
BH CV ECHO MEAS - AO MAX PG (FULL): 2.2 MMHG
BH CV ECHO MEAS - AO MAX PG: 5 MMHG
BH CV ECHO MEAS - AO MEAN PG (FULL): 2 MMHG
BH CV ECHO MEAS - AO MEAN PG: 3 MMHG
BH CV ECHO MEAS - AO ROOT AREA (BSA CORRECTED): 1.6
BH CV ECHO MEAS - AO ROOT AREA: 9.6 CM^2
BH CV ECHO MEAS - AO ROOT DIAM: 3.5 CM
BH CV ECHO MEAS - AO V2 MAX: 113 CM/SEC
BH CV ECHO MEAS - AO V2 MEAN: 80.1 CM/SEC
BH CV ECHO MEAS - AO V2 VTI: 21.5 CM
BH CV ECHO MEAS - ASC AORTA: 3.3 CM
BH CV ECHO MEAS - AVA(I,A): 3 CM^2
BH CV ECHO MEAS - AVA(I,D): 3 CM^2
BH CV ECHO MEAS - AVA(V,A): 3.1 CM^2
BH CV ECHO MEAS - AVA(V,D): 3.1 CM^2
BH CV ECHO MEAS - BSA(HAYCOCK): 2.3 M^2
BH CV ECHO MEAS - BSA: 2.2 M^2
BH CV ECHO MEAS - BZI_BMI: 29.3 KILOGRAMS/M^2
BH CV ECHO MEAS - BZI_METRIC_HEIGHT: 182.9 CM
BH CV ECHO MEAS - BZI_METRIC_WEIGHT: 98 KG
BH CV ECHO MEAS - EDV(CUBED): 126.5 ML
BH CV ECHO MEAS - EDV(MOD-SP2): 91 ML
BH CV ECHO MEAS - EDV(MOD-SP4): 97 ML
BH CV ECHO MEAS - EDV(TEICH): 119.3 ML
BH CV ECHO MEAS - EF(CUBED): 64 %
BH CV ECHO MEAS - EF(MOD-BP): 57 %
BH CV ECHO MEAS - EF(MOD-SP2): 54.9 %
BH CV ECHO MEAS - EF(MOD-SP4): 58.8 %
BH CV ECHO MEAS - EF(TEICH): 55.3 %
BH CV ECHO MEAS - ESV(CUBED): 45.5 ML
BH CV ECHO MEAS - ESV(MOD-SP2): 41 ML
BH CV ECHO MEAS - ESV(MOD-SP4): 40 ML
BH CV ECHO MEAS - ESV(TEICH): 53.3 ML
BH CV ECHO MEAS - FS: 28.9 %
BH CV ECHO MEAS - IVS/LVPW: 1
BH CV ECHO MEAS - IVSD: 1.2 CM
BH CV ECHO MEAS - LA DIMENSION: 3.8 CM
BH CV ECHO MEAS - LA/AO: 1.1
BH CV ECHO MEAS - LAD MAJOR: 5.3 CM
BH CV ECHO MEAS - LAT PEAK E' VEL: 11.1 CM/SEC
BH CV ECHO MEAS - LATERAL E/E' RATIO: 6.7
BH CV ECHO MEAS - LV DIASTOLIC VOL/BSA (35-75): 44.1 ML/M^2
BH CV ECHO MEAS - LV MASS(C)D: 233.9 GRAMS
BH CV ECHO MEAS - LV MASS(C)DI: 106.2 GRAMS/M^2
BH CV ECHO MEAS - LV MAX PG: 2.8 MMHG
BH CV ECHO MEAS - LV MEAN PG: 1 MMHG
BH CV ECHO MEAS - LV SYSTOLIC VOL/BSA (12-30): 18.2 ML/M^2
BH CV ECHO MEAS - LV V1 MAX: 84.2 CM/SEC
BH CV ECHO MEAS - LV V1 MEAN: 53.7 CM/SEC
BH CV ECHO MEAS - LV V1 VTI: 15.6 CM
BH CV ECHO MEAS - LVIDD: 5 CM
BH CV ECHO MEAS - LVIDS: 3.6 CM
BH CV ECHO MEAS - LVLD AP2: 7.8 CM
BH CV ECHO MEAS - LVLD AP4: 8 CM
BH CV ECHO MEAS - LVLS AP2: 7.1 CM
BH CV ECHO MEAS - LVLS AP4: 7.4 CM
BH CV ECHO MEAS - LVOT AREA (M): 4.2 CM^2
BH CV ECHO MEAS - LVOT AREA: 4.2 CM^2
BH CV ECHO MEAS - LVOT DIAM: 2.3 CM
BH CV ECHO MEAS - LVPWD: 1.2 CM
BH CV ECHO MEAS - MED PEAK E' VEL: 7.6 CM/SEC
BH CV ECHO MEAS - MEDIAL E/E' RATIO: 9.8
BH CV ECHO MEAS - MV A MAX VEL: 55.8 CM/SEC
BH CV ECHO MEAS - MV DEC SLOPE: 461 CM/SEC^2
BH CV ECHO MEAS - MV DEC TIME: 0.19 SEC
BH CV ECHO MEAS - MV E MAX VEL: 74.5 CM/SEC
BH CV ECHO MEAS - MV E/A: 1.3
BH CV ECHO MEAS - MV P1/2T MAX VEL: 107 CM/SEC
BH CV ECHO MEAS - MV P1/2T: 68 MSEC
BH CV ECHO MEAS - MVA P1/2T LCG: 2.1 CM^2
BH CV ECHO MEAS - MVA(P1/2T): 3.2 CM^2
BH CV ECHO MEAS - PA ACC SLOPE: 363 CM/SEC^2
BH CV ECHO MEAS - PA ACC TIME: 0.14 SEC
BH CV ECHO MEAS - PA PR(ACCEL): 15.3 MMHG
BH CV ECHO MEAS - SI(AO): 94 ML/M^2
BH CV ECHO MEAS - SI(CUBED): 36.8 ML/M^2
BH CV ECHO MEAS - SI(LVOT): 29.4 ML/M^2
BH CV ECHO MEAS - SI(MOD-SP2): 22.7 ML/M^2
BH CV ECHO MEAS - SI(MOD-SP4): 25.9 ML/M^2
BH CV ECHO MEAS - SI(TEICH): 30 ML/M^2
BH CV ECHO MEAS - SV(AO): 206.9 ML
BH CV ECHO MEAS - SV(CUBED): 81 ML
BH CV ECHO MEAS - SV(LVOT): 64.8 ML
BH CV ECHO MEAS - SV(MOD-SP2): 50 ML
BH CV ECHO MEAS - SV(MOD-SP4): 57 ML
BH CV ECHO MEAS - SV(TEICH): 66 ML
BH CV ECHO MEAS - TAPSE (>1.6): 2.6 CM
BH CV ECHO MEASUREMENTS AVERAGE E/E' RATIO: 7.97
BH CV VAS BP RIGHT ARM: NORMAL MMHG
BH CV XLRA - RV BASE: 3 CM
BH CV XLRA - RV LENGTH: 6.6 CM
BH CV XLRA - RV MID: 2.8 CM
BH CV XLRA - TDI S': 13 CM/SEC
LEFT ATRIUM VOLUME INDEX: 30.9 ML/M^2
LEFT ATRIUM VOLUME: 68 ML
MAXIMAL PREDICTED HEART RATE: 173 BPM
STRESS TARGET HR: 147 BPM

## 2021-10-13 PROCEDURE — 93306 TTE W/DOPPLER COMPLETE: CPT

## 2021-10-13 PROCEDURE — 93306 TTE W/DOPPLER COMPLETE: CPT | Performed by: INTERNAL MEDICINE

## 2021-10-14 NOTE — PROGRESS NOTES
Pts results reviewed and released to my chart:   echocardiogram is normal.    There are no significant valvular abnormalities noted.

## 2021-10-20 ENCOUNTER — TELEPHONE (OUTPATIENT)
Dept: CARDIOLOGY | Facility: HOSPITAL | Age: 46
End: 2021-10-20

## 2021-10-20 NOTE — TELEPHONE ENCOUNTER
Patient's results had been released to [facility] if he wants to review.       You can share with him that he had a normal heart muscle function with no concerning valve disease.  There was mild thickness of the heart muscle that is not concerning.  We will continue good blood pressure control.  Overall his results are acceptable.

## 2022-02-14 RX ORDER — LOSARTAN POTASSIUM 50 MG/1
50 TABLET ORAL DAILY
Qty: 30 TABLET | Refills: 0 | Status: SHIPPED | OUTPATIENT
Start: 2022-02-14 | End: 2022-04-14

## 2022-03-16 RX ORDER — FLECAINIDE ACETATE 50 MG/1
75 TABLET ORAL 2 TIMES DAILY
Qty: 90 TABLET | Refills: 0 | Status: SHIPPED | OUTPATIENT
Start: 2022-03-16 | End: 2022-04-18

## 2022-04-07 ENCOUNTER — TELEPHONE (OUTPATIENT)
Dept: CARDIOLOGY | Facility: CLINIC | Age: 47
End: 2022-04-07

## 2022-04-07 NOTE — TELEPHONE ENCOUNTER
"Patient's wife Rosalee called and stated that patients legs have started to swell over the past 3 days. She states that he has \"pitting edema\" and it goes all the way to his knees at times. She states that this is new. She states that he has been taking all of his medications as prescribed. I advised that patient should schedule an appointment with the [facility] to be seen in regards to this. I also advised that they schedule and EP appointment since patient has not been seen in over a year. Rosalee was agreeable to this, I transferred her to schedule these appointments.   "

## 2022-04-08 ENCOUNTER — LAB (OUTPATIENT)
Dept: LAB | Facility: HOSPITAL | Age: 47
End: 2022-04-08

## 2022-04-08 ENCOUNTER — HOSPITAL ENCOUNTER (OUTPATIENT)
Dept: CARDIOLOGY | Facility: HOSPITAL | Age: 47
Discharge: HOME | End: 2022-04-08

## 2022-04-08 ENCOUNTER — OFFICE VISIT (OUTPATIENT)
Dept: CARDIOLOGY | Facility: HOSPITAL | Age: 47
End: 2022-04-08

## 2022-04-08 VITALS
HEIGHT: 73 IN | BODY MASS INDEX: 30.38 KG/M2 | HEART RATE: 75 BPM | WEIGHT: 229.25 LBS | OXYGEN SATURATION: 97 % | RESPIRATION RATE: 18 BRPM | SYSTOLIC BLOOD PRESSURE: 121 MMHG | TEMPERATURE: 97.7 F | DIASTOLIC BLOOD PRESSURE: 83 MMHG

## 2022-04-08 DIAGNOSIS — I10 ESSENTIAL HYPERTENSION: ICD-10-CM

## 2022-04-08 DIAGNOSIS — I47.1 PSVT (PAROXYSMAL SUPRAVENTRICULAR TACHYCARDIA): ICD-10-CM

## 2022-04-08 DIAGNOSIS — I48.0 PAROXYSMAL ATRIAL FIBRILLATION: ICD-10-CM

## 2022-04-08 DIAGNOSIS — R60.0 EDEMA LEG: ICD-10-CM

## 2022-04-08 LAB
ALBUMIN SERPL-MCNC: 4.1 G/DL (ref 3.5–5.2)
ALBUMIN/GLOB SERPL: 1.4 G/DL
ALP SERPL-CCNC: 82 U/L (ref 39–117)
ALT SERPL W P-5'-P-CCNC: 37 U/L (ref 1–41)
ANION GAP SERPL CALCULATED.3IONS-SCNC: 9 MMOL/L (ref 5–15)
AST SERPL-CCNC: 27 U/L (ref 1–40)
BILIRUB SERPL-MCNC: 1.1 MG/DL (ref 0–1.2)
BUN SERPL-MCNC: 14 MG/DL (ref 6–20)
BUN/CREAT SERPL: 15.1 (ref 7–25)
CALCIUM SPEC-SCNC: 9.2 MG/DL (ref 8.6–10.5)
CHLORIDE SERPL-SCNC: 104 MMOL/L (ref 98–107)
CO2 SERPL-SCNC: 28 MMOL/L (ref 22–29)
CREAT SERPL-MCNC: 0.93 MG/DL (ref 0.76–1.27)
DEPRECATED RDW RBC AUTO: 38.7 FL (ref 37–54)
EGFRCR SERPLBLD CKD-EPI 2021: 101.3 ML/MIN/1.73
ERYTHROCYTE [DISTWIDTH] IN BLOOD BY AUTOMATED COUNT: 11.4 % (ref 12.3–15.4)
GLOBULIN UR ELPH-MCNC: 2.9 GM/DL
GLUCOSE SERPL-MCNC: 101 MG/DL (ref 65–99)
HCT VFR BLD AUTO: 44.7 % (ref 37.5–51)
HGB BLD-MCNC: 15.7 G/DL (ref 13–17.7)
MAGNESIUM SERPL-MCNC: 2 MG/DL (ref 1.6–2.6)
MCH RBC QN AUTO: 32.5 PG (ref 26.6–33)
MCHC RBC AUTO-ENTMCNC: 35.1 G/DL (ref 31.5–35.7)
MCV RBC AUTO: 92.5 FL (ref 79–97)
PLATELET # BLD AUTO: 200 10*3/MM3 (ref 140–450)
PMV BLD AUTO: 11.5 FL (ref 6–12)
POTASSIUM SERPL-SCNC: 4.2 MMOL/L (ref 3.5–5.2)
PROT SERPL-MCNC: 7 G/DL (ref 6–8.5)
QT INTERVAL: 390 MS
QTC INTERVAL: 429 MS
RBC # BLD AUTO: 4.83 10*6/MM3 (ref 4.14–5.8)
SODIUM SERPL-SCNC: 141 MMOL/L (ref 136–145)
TSH SERPL DL<=0.05 MIU/L-ACNC: 0.82 UIU/ML (ref 0.27–4.2)
WBC NRBC COR # BLD: 7.73 10*3/MM3 (ref 3.4–10.8)

## 2022-04-08 PROCEDURE — 85027 COMPLETE CBC AUTOMATED: CPT

## 2022-04-08 PROCEDURE — 93005 ELECTROCARDIOGRAM TRACING: CPT | Performed by: NURSE PRACTITIONER

## 2022-04-08 PROCEDURE — 80053 COMPREHEN METABOLIC PANEL: CPT

## 2022-04-08 PROCEDURE — 99214 OFFICE O/P EST MOD 30 MIN: CPT | Performed by: NURSE PRACTITIONER

## 2022-04-08 PROCEDURE — 36415 COLL VENOUS BLD VENIPUNCTURE: CPT

## 2022-04-08 PROCEDURE — 93010 ELECTROCARDIOGRAM REPORT: CPT | Performed by: INTERNAL MEDICINE

## 2022-04-08 PROCEDURE — 83735 ASSAY OF MAGNESIUM: CPT

## 2022-04-08 PROCEDURE — 84443 ASSAY THYROID STIM HORMONE: CPT

## 2022-04-08 RX ORDER — FUROSEMIDE 20 MG/1
20 TABLET ORAL DAILY
Qty: 5 TABLET | Refills: 0 | Status: SHIPPED | OUTPATIENT
Start: 2022-04-08

## 2022-04-08 NOTE — PROGRESS NOTES
"[facility], [facility]        Chief Complaint  Edema (New edema after fishing trip with leg pain)    Subjective    History of Present Illness {CC  Problem List  Visit  Diagnosis   Encounters  Notes  Medications  Labs  Result Review Imaging  Media :23}     Mikie Meléndez presents to [facility] CARDIOLOGY for   History of Present Illness       47-year-old male with PAF, PVCs, PSVT.  Status post PVA 9/28/2016.  History of continue palpitations since ablation.  Noted atrial tachycardia currently on flecainide.  Last Holter monitor 8/31/2021 (48 hours).  No atrial fibrillation, short runs of atrial tachycardia.  Patient with hypertension.    Echocardiogram 10/13/2021: EF 56 to 60%, no significant valvular disease.    Patient presents today with edema.  Patient reports he had been on a fishing trip recently.  After returning from his fishing trip he had lower extremity edema.  Stated he has stayed well-hydrated, no change in diet or exercise.  Reported standing for long periods of time while fishing.  He does note lower extremity pain in his calves.  Edema has improved slightly over the last 24 hours.  He is on amlodipine and has been for some time but had not noted edema associated with amlodipine in the past.  He does not complain of palpitations that are unusual, chest pain, pressure, dyspnea, dizziness, near syncope, syncope.  No history of DVTs or PEs noted.      Objective     Vital Signs:   Vitals:    04/08/22 1315   BP: 121/83   BP Location: Left arm   Patient Position: Sitting   Cuff Size: Adult   Pulse: 75   Resp: 18   Temp: 97.7 °F (36.5 °C)   TempSrc: Temporal   SpO2: 97%   Weight: 104 kg (229 lb 4 oz)   Height: 185.4 cm (72.99\")     Body mass index is 30.25 kg/m².  Physical Exam  Vitals reviewed.   Constitutional:       General: He is not in acute distress.     Appearance: Normal appearance.   Cardiovascular:      Rate and Rhythm: " Normal rate and regular rhythm.      Pulses:           Radial pulses are 2+ on the right side.        Dorsalis pedis pulses are 2+ on the right side.        Posterior tibial pulses are 2+ on the right side.      Heart sounds: Normal heart sounds.   Pulmonary:      Effort: Pulmonary effort is normal.      Breath sounds: Normal breath sounds.   Abdominal:      Palpations: Abdomen is soft.      Tenderness: There is no abdominal tenderness.   Musculoskeletal:         General: Tenderness ( Tenderness to bilateral calves.) present.      Right lower leg: Edema ( 1+ bilaterally.) present.      Left lower leg: Edema present.   Skin:     General: Skin is warm and dry.   Neurological:      Mental Status: He is alert.   Psychiatric:         Mood and Affect: Mood normal.         Behavior: Behavior is cooperative.              Result Review  Data Reviewed:{ Labs  Result Review  Imaging  Med Tab  Media :23}   Echocardiogram 10/13/2021: EF 56 to 60%, no significant valvular disease.  Assessment and Plan {CC Problem List  Visit Diagnosis  ROS  Review (Popup)  Health Maintenance  Quality  BestPractice  Medications  SmartSets  SnapShot Encounters  Media :23}   1. Edema leg  No lower extremity edema noted after recent fishing trip.    - ECG 12 Lead; normal sinus rhythm 73 bpm    - Duplex Venous Lower Extremity - Bilateral CAR; Future    - furosemide (LASIX) 20 MG tablet; Take 1 tablet by mouth Daily.  Dispense: 5 tablet; Refill: 0    Encourage compression stockings    2. Essential hypertension  Blood pressure well controlled.    - ECG 12 Lead; Future    - Comprehensive Metabolic Panel; Future  - CBC (No Diff); Future    3. PSVT (paroxysmal supraventricular tachycardia) ([facility])  History of ablation in 2016 continued on beta-blocker, flecainide  - ECG 12 Lead; Future    4. Paroxysmal atrial fibrillation (HCC)  No A. fib recurrence.  Currently on flecainide for continued palpitations, aspirin    No A. fib on EKG today    -  ECG 12 Lead; Future  - TSH; Future  - Magnesium; Future    Follow-up with [facility] as scheduled.  Follow-up in the heart valve center as needed.        Follow Up {Instructions Charge Capture  Follow-up Communications :23}   Return if symptoms worsen or fail to improve.    Patient was given instructions and counseling regarding his condition or for health maintenance advice. Please see specific information pulled into the AVS if appropriate.  Patient was instructed to call the [facility] with any questions, concerns, or worsening symptoms.

## 2022-04-11 ENCOUNTER — TELEPHONE (OUTPATIENT)
Dept: CARDIOLOGY | Facility: HOSPITAL | Age: 47
End: 2022-04-11

## 2022-04-11 DIAGNOSIS — I10 ESSENTIAL HYPERTENSION: ICD-10-CM

## 2022-04-11 DIAGNOSIS — I48.0 PAROXYSMAL ATRIAL FIBRILLATION: ICD-10-CM

## 2022-04-11 DIAGNOSIS — I47.1 PSVT (PAROXYSMAL SUPRAVENTRICULAR TACHYCARDIA): ICD-10-CM

## 2022-04-11 DIAGNOSIS — R60.0 EDEMA LEG: Primary | ICD-10-CM

## 2022-04-11 NOTE — TELEPHONE ENCOUNTER
----- Message from Low Garcia sent at 4/11/2022 11:50 AM EDT -----  Patient called with concerns about Ultra sound apt tomorrow being unnecessary, wants to see if you would schedule and echo to be completed. Concerns about swelling in both legs.Told her(patients wife) to go to apt tomorrow and you will follow up with them when you return. And to call back with any more questions or concerns.

## 2022-04-11 NOTE — PROGRESS NOTES
Your lab results have been reviewed.  Your thyroid function is normal.  Your kidney function is normal.  Your blood count is normal as well.  Overall your labs are considered acceptable.    Continue the Lasix 20 mg daily for 5 days as previously discussed.  If your swelling does not improve or worsen please let me know.

## 2022-04-11 NOTE — TELEPHONE ENCOUNTER
I will be glad to order an echo.   May not be completed tomorrow but I will place ordered.  Remind patient we are completing doppler to make sure he does not have blood clots in his legs.  Of course he does have the right to cancel if he chooses to.

## 2022-04-12 ENCOUNTER — HOSPITAL ENCOUNTER (OUTPATIENT)
Dept: CARDIOLOGY | Facility: HOSPITAL | Age: 47
Discharge: HOME | End: 2022-04-12
Admitting: NURSE PRACTITIONER

## 2022-04-12 VITALS — HEIGHT: 73 IN | BODY MASS INDEX: 30.39 KG/M2 | WEIGHT: 229.28 LBS

## 2022-04-12 DIAGNOSIS — R60.0 EDEMA LEG: ICD-10-CM

## 2022-04-12 LAB
BH CV LOWER VASCULAR LEFT COMMON FEMORAL AUGMENT: NORMAL
BH CV LOWER VASCULAR LEFT COMMON FEMORAL COMPRESS: NORMAL
BH CV LOWER VASCULAR LEFT COMMON FEMORAL PHASIC: NORMAL
BH CV LOWER VASCULAR LEFT COMMON FEMORAL SPONT: NORMAL
BH CV LOWER VASCULAR LEFT DISTAL FEMORAL COMPRESS: NORMAL
BH CV LOWER VASCULAR LEFT GASTRONEMIUS COMPRESS: NORMAL
BH CV LOWER VASCULAR LEFT GREATER SAPH AK COMPRESS: NORMAL
BH CV LOWER VASCULAR LEFT GREATER SAPH BK COMPRESS: NORMAL
BH CV LOWER VASCULAR LEFT LESSER SAPH COMPRESS: NORMAL
BH CV LOWER VASCULAR LEFT MID FEMORAL AUGMENT: NORMAL
BH CV LOWER VASCULAR LEFT MID FEMORAL COMPRESS: NORMAL
BH CV LOWER VASCULAR LEFT MID FEMORAL PHASIC: NORMAL
BH CV LOWER VASCULAR LEFT MID FEMORAL SPONT: NORMAL
BH CV LOWER VASCULAR LEFT PERONEAL COMPRESS: NORMAL
BH CV LOWER VASCULAR LEFT POPLITEAL AUGMENT: NORMAL
BH CV LOWER VASCULAR LEFT POPLITEAL COMPRESS: NORMAL
BH CV LOWER VASCULAR LEFT POPLITEAL PHASIC: NORMAL
BH CV LOWER VASCULAR LEFT POPLITEAL SPONT: NORMAL
BH CV LOWER VASCULAR LEFT POSTERIOR TIBIAL COMPRESS: NORMAL
BH CV LOWER VASCULAR LEFT PROFUNDA FEMORAL COMPRESS: NORMAL
BH CV LOWER VASCULAR LEFT PROXIMAL FEMORAL COMPRESS: NORMAL
BH CV LOWER VASCULAR LEFT SAPHENOFEMORAL JUNCTION COMPRESS: NORMAL
BH CV LOWER VASCULAR RIGHT COMMON FEMORAL AUGMENT: NORMAL
BH CV LOWER VASCULAR RIGHT COMMON FEMORAL COMPRESS: NORMAL
BH CV LOWER VASCULAR RIGHT COMMON FEMORAL PHASIC: NORMAL
BH CV LOWER VASCULAR RIGHT COMMON FEMORAL SPONT: NORMAL
BH CV LOWER VASCULAR RIGHT DISTAL FEMORAL COMPRESS: NORMAL
BH CV LOWER VASCULAR RIGHT GASTRONEMIUS COMPRESS: NORMAL
BH CV LOWER VASCULAR RIGHT GREATER SAPH AK COMPRESS: NORMAL
BH CV LOWER VASCULAR RIGHT GREATER SAPH BK COMPRESS: NORMAL
BH CV LOWER VASCULAR RIGHT LESSER SAPH COMPRESS: NORMAL
BH CV LOWER VASCULAR RIGHT MID FEMORAL AUGMENT: NORMAL
BH CV LOWER VASCULAR RIGHT MID FEMORAL COMPRESS: NORMAL
BH CV LOWER VASCULAR RIGHT MID FEMORAL PHASIC: NORMAL
BH CV LOWER VASCULAR RIGHT MID FEMORAL SPONT: NORMAL
BH CV LOWER VASCULAR RIGHT PERONEAL COMPRESS: NORMAL
BH CV LOWER VASCULAR RIGHT POPLITEAL AUGMENT: NORMAL
BH CV LOWER VASCULAR RIGHT POPLITEAL COMPRESS: NORMAL
BH CV LOWER VASCULAR RIGHT POPLITEAL PHASIC: NORMAL
BH CV LOWER VASCULAR RIGHT POPLITEAL SPONT: NORMAL
BH CV LOWER VASCULAR RIGHT POSTERIOR TIBIAL COMPRESS: NORMAL
BH CV LOWER VASCULAR RIGHT PROFUNDA FEMORAL COMPRESS: NORMAL
BH CV LOWER VASCULAR RIGHT PROXIMAL FEMORAL COMPRESS: NORMAL
BH CV LOWER VASCULAR RIGHT SAPHENOFEMORAL JUNCTION COMPRESS: NORMAL
MAXIMAL PREDICTED HEART RATE: 172 BPM
STRESS TARGET HR: 146 BPM

## 2022-04-12 PROCEDURE — 93970 EXTREMITY STUDY: CPT | Performed by: INTERNAL MEDICINE

## 2022-04-12 PROCEDURE — 93970 EXTREMITY STUDY: CPT

## 2022-04-12 NOTE — PROGRESS NOTES
Your venous ultrasound of your lower extremity results have been reviewed.  Your results are acceptable.  No evidence of a blood clot (deep venous thrombosis) in your lower extremities.     Finish your Lasix as previously described.  If your symptoms do not improve or worsen please let me know.  Continue on your aspirin as previously ordered.    I have ordered the echocardiogram as you requested.  You will be called for scheduling.

## 2022-04-14 RX ORDER — LOSARTAN POTASSIUM 50 MG/1
50 TABLET ORAL DAILY
Qty: 30 TABLET | Refills: 6 | Status: SHIPPED | OUTPATIENT
Start: 2022-04-14 | End: 2022-05-03 | Stop reason: SDUPTHER

## 2022-04-18 RX ORDER — FLECAINIDE ACETATE 50 MG/1
TABLET ORAL
Qty: 90 TABLET | Refills: 6 | Status: SHIPPED | OUTPATIENT
Start: 2022-04-18 | End: 2022-11-15

## 2022-04-27 ENCOUNTER — HOSPITAL ENCOUNTER (OUTPATIENT)
Dept: CARDIOLOGY | Facility: HOSPITAL | Age: 47
Discharge: HOME | End: 2022-04-27
Admitting: NURSE PRACTITIONER

## 2022-04-27 VITALS — BODY MASS INDEX: 30.39 KG/M2 | WEIGHT: 229.28 LBS | HEIGHT: 73 IN

## 2022-04-27 DIAGNOSIS — I48.0 PAROXYSMAL ATRIAL FIBRILLATION: ICD-10-CM

## 2022-04-27 DIAGNOSIS — I10 ESSENTIAL HYPERTENSION: ICD-10-CM

## 2022-04-27 DIAGNOSIS — I47.1 PSVT (PAROXYSMAL SUPRAVENTRICULAR TACHYCARDIA): ICD-10-CM

## 2022-04-27 DIAGNOSIS — R60.0 EDEMA LEG: ICD-10-CM

## 2022-04-27 LAB
BH CV ECHO MEAS - AO MAX PG: 2.8 MMHG
BH CV ECHO MEAS - AO MEAN PG: 1.76 MMHG
BH CV ECHO MEAS - AO ROOT DIAM: 3.5 CM
BH CV ECHO MEAS - AO V2 MAX: 83.4 CM/SEC
BH CV ECHO MEAS - AO V2 VTI: 19.9 CM
BH CV ECHO MEAS - AVA(I,D): 2.47 CM2
BH CV ECHO MEAS - EDV(CUBED): 177 ML
BH CV ECHO MEAS - EDV(MOD-SP2): 116 ML
BH CV ECHO MEAS - EDV(MOD-SP4): 125 ML
BH CV ECHO MEAS - EF(MOD-BP): 55 %
BH CV ECHO MEAS - EF(MOD-SP2): 56 %
BH CV ECHO MEAS - EF(MOD-SP4): 53.6 %
BH CV ECHO MEAS - ESV(CUBED): 71 ML
BH CV ECHO MEAS - ESV(MOD-SP2): 51 ML
BH CV ECHO MEAS - ESV(MOD-SP4): 58 ML
BH CV ECHO MEAS - FS: 26.2 %
BH CV ECHO MEAS - IVS/LVPW: 1.05 CM
BH CV ECHO MEAS - IVSD: 1.17 CM
BH CV ECHO MEAS - LA DIMENSION: 3.7 CM
BH CV ECHO MEAS - LV DIASTOLIC VOL/BSA (35-75): 55.4 CM2
BH CV ECHO MEAS - LV MASS(C)D: 262.5 GRAMS
BH CV ECHO MEAS - LV MAX PG: 1.24 MMHG
BH CV ECHO MEAS - LV MEAN PG: 0.81 MMHG
BH CV ECHO MEAS - LV SYSTOLIC VOL/BSA (12-30): 25.7 CM2
BH CV ECHO MEAS - LV V1 MAX: 55.8 CM/SEC
BH CV ECHO MEAS - LV V1 VTI: 14.3 CM
BH CV ECHO MEAS - LVIDD: 5.6 CM
BH CV ECHO MEAS - LVIDS: 4.1 CM
BH CV ECHO MEAS - LVOT AREA: 3.4 CM2
BH CV ECHO MEAS - LVOT DIAM: 2.09 CM
BH CV ECHO MEAS - LVPWD: 1.11 CM
BH CV ECHO MEAS - MV DEC SLOPE: 324.6 CM/SEC2
BH CV ECHO MEAS - MV DEC TIME: 0.13 MSEC
BH CV ECHO MEAS - MV E MAX VEL: 97.2 CM/SEC
BH CV ECHO MEAS - MVA(P1/2T): 2.4 CM2
BH CV ECHO MEAS - PA ACC SLOPE: 330.2 CM/SEC2
BH CV ECHO MEAS - PA ACC TIME: 0.16 SEC
BH CV ECHO MEAS - PA PR(ACCEL): 7.7 MMHG
BH CV ECHO MEAS - PI END-D VEL: 95.6 CM/SEC
BH CV ECHO MEAS - RAP SYSTOLE: 8 MMHG
BH CV ECHO MEAS - RVSP: 21 MMHG
BH CV ECHO MEAS - SI(MOD-SP2): 28.8 ML/M2
BH CV ECHO MEAS - SI(MOD-SP4): 29.7 ML/M2
BH CV ECHO MEAS - SV(LVOT): 49.1 ML
BH CV ECHO MEAS - SV(MOD-SP2): 65 ML
BH CV ECHO MEAS - SV(MOD-SP4): 67 ML
BH CV ECHO MEAS - TAPSE (>1.6): 2 CM
BH CV ECHO MEAS - TR MAX PG: 13 MMHG
BH CV ECHO MEAS - TR MAX VEL: 180.3 CM/SEC
BH CV VAS BP LEFT ARM: NORMAL MMHG
BH CV XLRA - RV BASE: 3.7 CM
BH CV XLRA - RV LENGTH: 7.7 CM
BH CV XLRA - RV MID: 3.3 CM
BH CV XLRA - TDI S': 11 CM/SEC
LEFT ATRIUM VOLUME INDEX: 24.8 ML/M2
LEFT ATRIUM VOLUME: 56 CM3
MAXIMAL PREDICTED HEART RATE: 172 BPM
STRESS TARGET HR: 146 BPM

## 2022-04-27 PROCEDURE — 93306 TTE W/DOPPLER COMPLETE: CPT | Performed by: INTERNAL MEDICINE

## 2022-04-27 PROCEDURE — 93306 TTE W/DOPPLER COMPLETE: CPT

## 2022-04-27 NOTE — PROGRESS NOTES
Your echocardiogram results have been reviewed.  You have a normal heart muscle function.  No concerning valvular disease was noted.  Your results are acceptable.

## 2022-05-03 ENCOUNTER — TELEPHONE (OUTPATIENT)
Dept: CARDIOLOGY | Facility: HOSPITAL | Age: 47
End: 2022-05-03

## 2022-05-03 DIAGNOSIS — I10 ESSENTIAL HYPERTENSION: Primary | ICD-10-CM

## 2022-05-03 RX ORDER — LOSARTAN POTASSIUM 50 MG/1
50 TABLET ORAL 2 TIMES DAILY
Qty: 60 TABLET | Refills: 3 | Status: SHIPPED | OUTPATIENT
Start: 2022-05-03 | End: 2022-08-25

## 2022-05-03 NOTE — TELEPHONE ENCOUNTER
----- Message from Anaid Walker MA sent at 5/3/2022  1:25 PM EDT -----  Pt wife stated he has only taken one of the lasix 20 mg. Wants to know if he can get another prescription for it sent. She did ask if the amlodipine could cause the edema?  ----- Message -----  From: Marta Cook APRN  Sent: 5/3/2022   1:11 PM EDT  To: Anaid Walker MA    Pt was given lasix 20 mg daily for 5 days.  Last time we spoke he has not taken the Lasix.  He can take as prescribed and f/u with [facility] as scheduled. I would also encourage compression stockings.   ----- Message -----  From: Anaid Walker MA  Sent: 5/3/2022  12:37 PM EDT  To: MATTIE Alejandro    Pt's wife called and stated that is having edema. Returned and no answer and left voicemail to return my call

## 2022-05-03 NOTE — TELEPHONE ENCOUNTER
Yes amlodipine can cause lower extremity swelling.     increase losartan to 50 mg twice a day   Stop the amlodipine.    Finish the Lasix 20 mg daily for the next 4 days.    Call is swelling has not improved next week.      Follow-up with Dr. Campo on 5/24/2022.    Repeat BMP on 05/24/22

## 2022-05-04 NOTE — TELEPHONE ENCOUNTER
Left messages on recognizable voice mails on 5/3/22 with medications changes and instructions.    Called today and spoke to wife to check to see if they received messages and she stated yes and was able to repeat instructions and had no further questions at this time.

## 2022-05-23 ENCOUNTER — TELEPHONE (OUTPATIENT)
Dept: CARDIOLOGY | Facility: HOSPITAL | Age: 47
End: 2022-05-23

## 2022-05-23 RX ORDER — CLONIDINE HYDROCHLORIDE 0.1 MG/1
TABLET ORAL
Qty: 30 TABLET | Refills: 3 | Status: SHIPPED | OUTPATIENT
Start: 2022-05-23 | End: 2022-05-31

## 2022-05-23 NOTE — TELEPHONE ENCOUNTER
----- Message from Neeta Lam CMA sent at 5/23/2022  9:57 AM EDT -----  Regarding: BP  Patient's wife called and stated that patient had a dental procedure scheduled for today which was cancelled due to patient's blood pressure being too high. Patient's wife reports that blood pressure was 174/121 and that patient was taken off Amlodipine but Losartan was increased. Denies any symptoms.

## 2022-05-23 NOTE — TELEPHONE ENCOUNTER
Spoke with patient's wife. Will send in Clonidine for prn use.  Take clonidine for sbp>160 or DBP>110

## 2022-05-23 NOTE — TELEPHONE ENCOUNTER
Spoke with patient and he asked that I call his wife and speak to her about the medications.    Attempted to call patient's wife to discuss elevated blood pressure but was unable to reach her.  Did leave a voicemail for her to return my call at her convenience.

## 2022-05-24 ENCOUNTER — OFFICE VISIT (OUTPATIENT)
Dept: CARDIOLOGY | Facility: CLINIC | Age: 47
End: 2022-05-24

## 2022-05-24 VITALS
HEART RATE: 68 BPM | BODY MASS INDEX: 29.82 KG/M2 | WEIGHT: 225 LBS | HEIGHT: 73 IN | DIASTOLIC BLOOD PRESSURE: 102 MMHG | OXYGEN SATURATION: 99 % | SYSTOLIC BLOOD PRESSURE: 138 MMHG

## 2022-05-24 DIAGNOSIS — I10 ESSENTIAL HYPERTENSION: ICD-10-CM

## 2022-05-24 DIAGNOSIS — R00.2 PALPITATIONS: ICD-10-CM

## 2022-05-24 DIAGNOSIS — I48.0 PAROXYSMAL ATRIAL FIBRILLATION: Primary | ICD-10-CM

## 2022-05-24 PROCEDURE — 93000 ELECTROCARDIOGRAM COMPLETE: CPT | Performed by: STUDENT IN AN ORGANIZED HEALTH CARE EDUCATION/TRAINING PROGRAM

## 2022-05-24 PROCEDURE — 99214 OFFICE O/P EST MOD 30 MIN: CPT | Performed by: STUDENT IN AN ORGANIZED HEALTH CARE EDUCATION/TRAINING PROGRAM

## 2022-05-24 RX ORDER — AMOXICILLIN 875 MG/1
875 TABLET, COATED ORAL 2 TIMES DAILY
COMMUNITY
Start: 2022-05-17

## 2022-05-24 RX ORDER — CARVEDILOL 6.25 MG/1
6.25 TABLET ORAL 2 TIMES DAILY
Qty: 60 TABLET | Refills: 11 | Status: SHIPPED | OUTPATIENT
Start: 2022-05-24 | End: 2022-06-02 | Stop reason: DRUGHIGH

## 2022-05-24 NOTE — PROGRESS NOTES
Cardiac Electrophysiology Outpatient Note  [facility] at [facility]    Office Visit     Mikie Meléndez  [id number]  05/24/2022    Primary Care Physician: Heather Ibarra MD    Referred By: No ref. provider found    CC:   Chief Complaint   Patient presents with   • Atrial Fibrillation   • Cardiomyopathy   • Hypertension       Problem List:  1. Paroxysmal Atrial Fibrillation   a. History of previous stress testing and Holter monitoring, data insufficient.   b. Sinus rhythm by EKG.   c. Holter monitor showing sinus rhythm. Rare PVCs. One short run of atrial tachycardia. No AV block or pauses.  d. Echocardiogram with a preliminary report, 12/29/2014. EF 55% to 60%. Left atrium at 3.7 cm.  e. Dec 31 st diagnosis of Afib with RVR. ECV at  on jan 1st with initiation of Xarelto; stopped and changed to Eliquis   f. Echocardiogram 5/19/2016: EF 40-45%. Mild MR. Mild physiologic TR present. ? Tachy induced CMP  g. Cardiac catheterization 6/2/2016: EF 65%. Normal left ventricular function. Geographically normal coronary arteries.  h. Echocardiogram 6/24/2016: EF 50%. No thrombus or mass detected in left atrium or left atrial appendage. Mild left atrial enlargement. Trace intermittent MR.  i. Event Monitor 5/24/2016-6/23/2016 showing runs of PVCs and PACs (half time PAC and other PVCs). No Afib. At times symptoms of palpitations, SOB correlated with ectopy and at other times NSR with no abnormalities.   j. Cardioversion in January and June 2016. Started on Flec 50 mg BID, Toprol and Eliquis  k. S/p PVA 9/28/16, SD  l. Event monitor from 3/7-4/5/18 demonstrated mostly NSR with PVCs and a couple short runs of PSVT both of which were associated with skipped beats, racing heart and dizziness, subsequently re-started on Flec.   m. 3-day Holter monitor 9/2021: No sustained A. fib, short runs of AT, 1 episode of possible nonsustained flutter  n. Echo 4/27/2022: EF 56 to 60%, RA mildly  "dilated  2. Dyslipidemia.   3. Excessive caffeine intake.   4. Family history of tachycardia    History of Present Illness:   Mikie Meléndez is a 47 y.o. male who presents to the electrophysiology clinic for follow up of paroxysmal atrial fibrillation status post PVA 9/28/2016.  He is wearing 3 event monitor since his ablation has not had any recurrent A. fib.  Some atrial tachycardia has been noted on monitors and he was started on flecainide therapy with improvement.    He overall has done fairly well since last appointment.  He continues to have occasional palpitations that are minimally bothersome to him.  He is able to feel them but he is usually short and mostly he is able live with them.  He has not had any prolonged palpitations.  He denies any chest pain, dyspnea on exertion, orthopnea, or PND.  Have some lower extremity swelling and his amlodipine was recently stopped by Liz Cook..  He has noted some higher blood pressures since then.  In fact he went to have a dental procedure done but this was aborted due to his high blood pressure.  He took a one-time dose of as needed clonidine to help bring it down.    Past Surgical History:   Procedure Laterality Date   • APPENDECTOMY     • BACK SURGERY     • CARDIAC CATHETERIZATION  06/2016    \"NORMAL\" PER DR. KIMANI PHIPPS   • CARDIAC ELECTROPHYSIOLOGY PROCEDURE N/A 9/28/2016    Procedure: PVA. Stop Flecainide 5 days prior to the procedure. Stop Metoprolol 3 days prior to the procedure.;  Surgeon: Kai Mitchell DO;  Location:  [facility];  Service:    • CARDIOVERSION  06/24/2016   • CYSTOSCOPY W/ URETERAL STENT PLACEMENT Left 4/12/2019    Procedure: LEFT UTEROSCOPY, LEFT URETERAL BALLOON DIALATION, LEFT URETERAL STONE BASKET EXTRACTION, PLACEMENT OF LEFT URETERAL STENT 4.8;  Surgeon: Johnson White MD;  Location:  [facility];  Service: Urology   • EXTRACORPOREAL SHOCK WAVE LITHOTRIPSY (ESWL)     • HERNIA REPAIR     • LASIK     • NASAL " "SEPTUM SURGERY         Family History   Problem Relation Age of Onset   • Other Other         tachycardia   • No Known Problems Mother    • No Known Problems Father    • Arrhythmia Sister    • No Known Problems Sister        Social History     Socioeconomic History   • Marital status:    Tobacco Use   • Smoking status: Never Smoker   • Smokeless tobacco: Never Used   Vaping Use   • Vaping Use: Never used   Substance and Sexual Activity   • Alcohol use: No   • Drug use: No   • Sexual activity: Yes     Partners: Female     Birth control/protection: None         Current Outpatient Medications:   •  aspirin 81 MG EC tablet, Take 81 mg by mouth Daily., Disp: , Rfl:   •  atorvastatin (LIPITOR) 40 MG tablet, Take 40 mg by mouth every night at bedtime., Disp: , Rfl:   •  cloNIDine (Catapres) 0.1 MG tablet, Take 1 tablet 12 hours prn for SBP>160 or DBP>110, Disp: 30 tablet, Rfl: 3  •  flecainide (TAMBOCOR) 50 MG tablet, TAKE 1 AND 1/2 TABLETS BY MOUTH 2 TIMES A DAY, Disp: 90 tablet, Rfl: 6  •  furosemide (LASIX) 20 MG tablet, Take 1 tablet by mouth Daily., Disp: 5 tablet, Rfl: 0  •  losartan (COZAAR) 50 MG tablet, Take 1 tablet by mouth 2 (Two) Times a Day., Disp: 60 tablet, Rfl: 3  •  amoxicillin (AMOXIL) 875 MG tablet, Take 875 mg by mouth 2 (Two) Times a Day. for 7 days, Disp: , Rfl:   •  carvedilol (COREG) 6.25 MG tablet, Take 1 tablet by mouth 2 (Two) Times a Day., Disp: 60 tablet, Rfl: 11    Allergies:   No Known Allergies    Review of Systems:  ROS     Vital Signs: Blood pressure (!) 138/102, pulse 68, height 185.4 cm (72.99\"), weight 102 kg (225 lb), SpO2 99 %.    Physical Exam    Lab Results   Component Value Date    GLUCOSE 101 (H) 04/08/2022    CALCIUM 9.2 04/08/2022     04/08/2022    K 4.2 04/08/2022    CO2 28.0 04/08/2022     04/08/2022    BUN 14 04/08/2022    CREATININE 0.93 04/08/2022    EGFRIFNONA 67 04/13/2019    BCR 15.1 04/08/2022    ANIONGAP 9.0 04/08/2022     Lab Results   Component " Value Date    WBC 7.73 04/08/2022    HGB 15.7 04/08/2022    HCT 44.7 04/08/2022    MCV 92.5 04/08/2022     04/08/2022     Lab Results   Component Value Date    INR 1.07 04/12/2019    INR 1.02 09/27/2016    INR 1.02 05/16/2016    PROTIME 13.4 04/12/2019    PROTIME 11.1 09/27/2016    PROTIME 11.1 05/16/2016     Lab Results   Component Value Date    TSH 0.824 04/08/2022        Results for orders placed during the hospital encounter of 04/27/22    Adult Transthoracic Echo Complete W/ Cont if Necessary Per Protocol    Interpretation Summary  · Left ventricular ejection fraction appears to be 56 - 60%. Left ventricular systolic function is normal.  · Estimated right ventricular systolic pressure from tricuspid regurgitation is normal (<35 mmHg).  · The right atrial cavity is mildly dilated.  · Left ventricular diastolic function was normal.      I personally viewed and interpreted the patient's EKG/Telemetry/lab data.      ECG 12 Lead    Date/Time: 5/24/2022 10:31 AM  Performed by: Gus Campo MD  Authorized by: Gus Campo MD   Comparison: compared with previous ECG from 4/8/2022  Similar to previous ECG  Comments: Sinus rhythm with first-degree AV block            Mikie VELEZ Medhat  reports that he has never smoked. He has never used smokeless tobacco..  Assessment & Plan    1. Essential hypertension  His blood pressure has been elevated after stopping his amlodipine.  He currently is on losartan 50 mg twice daily and metoprolol 25 mg twice daily.  We discussed different options including switching his medication around was adding a diuretic.  For now, to make it easier we will go ahead and stop his metoprolol and start him on carvedilol 6.25 mg twice daily for additional blood pressure control.  This may need to be titrated upwards further.  He will continue to check his blood pressure at home and report back to his primary cardiologist.    2. Paroxysmal atrial fibrillation (HCC)  He has a history of atrial  fibrillation status post ablation.  Has not had any known recurrence of this.  He is now off of anticoagulation and just on aspirin.  His [id number] score equals 1 currently.    3. Palpitations  He continues to have occasional palpitations.  Previous monitor showed the associated short runs of atrial tachycardia and PVCs.  He is on flecainide 50 mg twice daily has improvement with this.  We discussed we could potentially increase his dosing further, but there is a risk of increasing side effects with this.  At the current time he is not too bothered and we will continue on the current dose.  His ECG was reviewed and he has a completely normal QRS durations was reasonable continue flecainide.       Follow Up:  No follow-ups on file.        Gus Campo MD  05/24/2022

## 2022-05-26 ENCOUNTER — TELEPHONE (OUTPATIENT)
Dept: CARDIOLOGY | Facility: CLINIC | Age: 47
End: 2022-05-26

## 2022-05-26 NOTE — TELEPHONE ENCOUNTER
Patient's wife called to let us know that even though Dr. Campo adjusted his BP medications on 5/24/22, his BP has continued to be elevated. She states that last night he had a headache. His BP was 145/108 so he took the PRN Clonidine. I instructed her to keep a log of his BP and HR twice a day for the next week and call us back with the readings. Since he has been having problems with lower extremity edema, I instructed her to have him weigh daily as well. I let her know that it may take a little longer for the new medication to get in his system, but if his BP continues to remain elevated, to call us back sooner. She agreed and verbalized understanding.

## 2022-05-31 RX ORDER — CLONIDINE HYDROCHLORIDE 0.1 MG/1
TABLET ORAL
Qty: 30 TABLET | Refills: 3 | Status: SHIPPED | OUTPATIENT
Start: 2022-05-31

## 2022-06-02 RX ORDER — CARVEDILOL 12.5 MG/1
12.5 TABLET ORAL 2 TIMES DAILY
Qty: 60 TABLET | Refills: 6 | Status: SHIPPED | OUTPATIENT
Start: 2022-06-02 | End: 2022-06-14 | Stop reason: DRUGHIGH

## 2022-06-14 RX ORDER — CARVEDILOL 25 MG/1
25 TABLET ORAL 2 TIMES DAILY
Qty: 60 TABLET | Refills: 6 | Status: SHIPPED | OUTPATIENT
Start: 2022-06-14 | End: 2022-07-06

## 2022-06-20 DIAGNOSIS — I10 PRIMARY HYPERTENSION: ICD-10-CM

## 2022-06-20 DIAGNOSIS — I48.0 PAROXYSMAL ATRIAL FIBRILLATION: Primary | ICD-10-CM

## 2022-06-20 RX ORDER — HYDROCHLOROTHIAZIDE 25 MG/1
25 TABLET ORAL DAILY
Qty: 30 TABLET | Refills: 6 | Status: SHIPPED | OUTPATIENT
Start: 2022-06-20 | End: 2022-07-06

## 2022-07-06 RX ORDER — AMLODIPINE BESYLATE 10 MG/1
10 TABLET ORAL DAILY
Qty: 30 TABLET | Refills: 5 | Status: SHIPPED | OUTPATIENT
Start: 2022-07-06 | End: 2022-12-21

## 2022-08-25 DIAGNOSIS — I10 ESSENTIAL HYPERTENSION: ICD-10-CM

## 2022-08-25 RX ORDER — LOSARTAN POTASSIUM 50 MG/1
TABLET ORAL
Qty: 60 TABLET | Refills: 3 | Status: SHIPPED | OUTPATIENT
Start: 2022-08-25 | End: 2022-12-27

## 2022-08-25 NOTE — TELEPHONE ENCOUNTER
Last visit on 4/8/22. Sent refills for losartan 50mg twice daily to [facility] in [place].     Keegan GossD

## 2022-11-15 RX ORDER — FLECAINIDE ACETATE 50 MG/1
TABLET ORAL
Qty: 90 TABLET | Refills: 6 | Status: SHIPPED | OUTPATIENT
Start: 2022-11-15

## 2022-12-21 RX ORDER — AMLODIPINE BESYLATE 10 MG/1
TABLET ORAL
Qty: 90 TABLET | Refills: 1 | Status: SHIPPED | OUTPATIENT
Start: 2022-12-21

## 2022-12-27 DIAGNOSIS — I10 ESSENTIAL HYPERTENSION: ICD-10-CM

## 2022-12-27 RX ORDER — LOSARTAN POTASSIUM 50 MG/1
TABLET ORAL
Qty: 60 TABLET | Refills: 3 | Status: SHIPPED | OUTPATIENT
Start: 2022-12-27

## 2023-05-16 DIAGNOSIS — I10 ESSENTIAL HYPERTENSION: ICD-10-CM

## 2023-05-16 RX ORDER — LOSARTAN POTASSIUM 50 MG/1
50 TABLET ORAL 2 TIMES DAILY
Qty: 60 TABLET | Refills: 0 | Status: SHIPPED | OUTPATIENT
Start: 2023-05-16

## 2023-06-11 DIAGNOSIS — I10 ESSENTIAL HYPERTENSION: ICD-10-CM

## 2023-06-12 RX ORDER — LOSARTAN POTASSIUM 50 MG/1
TABLET ORAL
Qty: 60 TABLET | Refills: 3 | Status: SHIPPED | OUTPATIENT
Start: 2023-06-12

## 2023-06-12 RX ORDER — FLECAINIDE ACETATE 50 MG/1
75 TABLET ORAL 2 TIMES DAILY
Qty: 90 TABLET | Refills: 6 | Status: SHIPPED | OUTPATIENT
Start: 2023-06-12

## 2023-07-31 RX ORDER — AMLODIPINE BESYLATE 10 MG/1
TABLET ORAL
Qty: 90 TABLET | Refills: 1 | OUTPATIENT
Start: 2023-07-31

## 2023-08-01 RX ORDER — AMLODIPINE BESYLATE 10 MG/1
TABLET ORAL
Qty: 90 TABLET | Refills: 1 | OUTPATIENT
Start: 2023-08-01

## 2023-08-09 RX ORDER — AMLODIPINE BESYLATE 10 MG/1
10 TABLET ORAL DAILY
Qty: 90 TABLET | Refills: 1 | OUTPATIENT
Start: 2023-08-09

## 2023-08-09 NOTE — TELEPHONE ENCOUNTER
Caller: Rosalee Meléndez    Relationship: Emergency Contact    Best call back number: [phone]    Requested Prescriptions:   Requested Prescriptions     Pending Prescriptions Disp Refills    amLODIPine (NORVASC) 10 MG tablet 90 tablet 1     Sig: Take 1 tablet by mouth Daily.        Pharmacy where request should be sent: [facility]/PHARMACY #[id number] - [address] AT [place] [address] - [phone] [facility] [phone] FX     Last office visit with prescribing clinician: 5/24/2022   Last telemedicine visit with prescribing clinician: Visit date not found   Next office visit with prescribing clinician: Visit date not found     Additional details provided by patient: PATIENT'S WIFE STATES HE IS COMPLETELY OUT AS OF TODAY. ALSO THEY ARE REQUESTING TO BE SEEN BY DR. MONTALVO AND NOT A PA FOR THE NEXT APPOINTMENT.    Does the patient have less than a 3 day supply:  [x] Yes  [] No    Would you like a call back once the refill request has been completed: [] Yes [] No    If the office needs to give you a call back, can they leave a voicemail: [] Yes [] No    Aguilar Bella Rep   08/09/23 10:30 EDT

## 2023-08-11 ENCOUNTER — TELEPHONE (OUTPATIENT)
Dept: CARDIOLOGY | Facility: CLINIC | Age: 48
End: 2023-08-11
Payer: COMMERCIAL

## 2023-08-11 NOTE — TELEPHONE ENCOUNTER
Patient's wife states that he is out of refills. I let her know that he has to be seen in our office once a year in order for us to continue to provide him with refills. I reminded her that he cancelled his f/u appointments on 11/29/22 and 6/23/23. I transferred her to scheduling so she can schedule him an appointment.

## 2023-08-15 RX ORDER — AMLODIPINE BESYLATE 10 MG/1
10 TABLET ORAL DAILY
Qty: 7 TABLET | Refills: 0 | Status: SHIPPED | OUTPATIENT
Start: 2023-08-15

## 2023-08-20 NOTE — PROGRESS NOTES
[facility]   OFFICE NOTE      Mikie Meléndez  1975  PCP: Heather Ibarra MD    SUBJECTIVE:   Mikie Meléndez is a 48 y.o. male seen for a follow up visit regarding the following:     CC: A-fib    HPI:   Pleasant 48-year-old gent returns a follow-up regarding atrial fibrillation, Tambocor therapy.  Since last seen per office he is doing quite well.  He said no palpitation dizziness near syncope or syncope.  Denies any chest pain.  Flecainide is kept in a normal sinus rhythm.  He reports to me blood pressures well controlled.  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    Cardiac PMH: (Old records have been reviewed and summarized below)  Paroxysmal Atrial Fibrillation   History of previous stress testing and Holter monitoring, data insufficient.   Sinus rhythm by EKG.   Holter monitor showing sinus rhythm. Rare PVCs. One short run of atrial tachycardia. No AV block or pauses.  Echocardiogram with a preliminary report, 12/29/2014. EF 55% to 60%. Left atrium at 3.7 cm.  Dec 31 st diagnosis of Afib with RVR. ECV at  on jan 1st with initiation of Xarelto; stopped and changed to Eliquis   Echocardiogram 5/19/2016: EF 40-45%. Mild MR. Mild physiologic TR present. ? Tachy induced CMP  Cardiac catheterization 6/2/2016: EF 65%. Normal left ventricular function. Geographically normal coronary arteries.  Echocardiogram 6/24/2016: EF 50%. No thrombus or mass detected in left atrium or left atrial appendage. Mild left atrial enlargement. Trace intermittent MR.  Event Monitor 5/24/2016-6/23/2016 showing runs of PVCs and PACs (half time PAC and other PVCs). No Afib. At times symptoms of palpitations, SOB correlated with ectopy and at other times NSR with no abnormalities.   Cardioversion in January and June 2016. Started on Flec 50 mg BID, Toprol and Eliquis  S/p PVA 9/28/16,  SD  Event monitor from 3/7-4/5/18 demonstrated mostly NSR with PVCs and a couple short runs of PSVT both of which were associated with skipped beats, racing heart and dizziness, subsequently re-started on Flec.   Dyslipidemia.   Excessive caffeine intake.   Family history of tachycardia    Past Medical History, Past Surgical History, Family history, Social History, and Medications were all reviewed with the patient today and updated as necessary.       Current Outpatient Medications:     amLODIPine (NORVASC) 10 MG tablet, Take 1 tablet by mouth Daily. MUST KEEP APPT- 8/21/23 AT 9:30, Disp: 7 tablet, Rfl: 0    amoxicillin (AMOXIL) 875 MG tablet, Take 875 mg by mouth 2 (Two) Times a Day. for 7 days, Disp: , Rfl:     aspirin 81 MG EC tablet, Take 81 mg by mouth Daily., Disp: , Rfl:     atorvastatin (LIPITOR) 40 MG tablet, Take 40 mg by mouth every night at bedtime., Disp: , Rfl:     cloNIDine (CATAPRES) 0.1 MG tablet, TAKE 1 TABLET 12 HOURS AS NEEDED FOR SBP>160 OR DBP>110, Disp: 30 tablet, Rfl: 3    flecainide (TAMBOCOR) 50 MG tablet, Take 1.5 tablets by mouth 2 (Two) Times a Day., Disp: 90 tablet, Rfl: 6    furosemide (LASIX) 20 MG tablet, Take 1 tablet by mouth Daily., Disp: 5 tablet, Rfl: 0    losartan (COZAAR) 50 MG tablet, TAKE 1 TABLET BY MOUTH TWICE A DAY, Disp: 60 tablet, Rfl: 3      No Known Allergies      PHYSICAL EXAM:    There were no vitals taken for this visit.       Wt Readings from Last 5 Encounters:   05/24/22 102 kg (225 lb)   04/27/22 104 kg (229 lb 4.5 oz)   04/12/22 104 kg (229 lb 4.5 oz)   04/08/22 104 kg (229 lb 4 oz)   10/13/21 98 kg (216 lb 0.8 oz)       BP Readings from Last 5 Encounters:   05/24/22 (!) 138/102   04/08/22 121/83   09/23/21 115/78   03/08/21 116/68   05/26/20 112/84       General appearance - Alert, well appearing, and in no distress   Mental status - Affect appropriate to mood.  Eyes - Sclerae anicteric,  ENMT - Hearing grossly normal bilaterally, Dental hygiene good.  Neck -  Carotids upstroke normal bilaterally, no bruits, no JVD.  Resp - Clear to auscultation, no wheezes, rales or rhonchi, symmetric air entry.  Heart - Normal rate, regular rhythm, normal S1, S2, no murmurs, rubs, clicks or gallops.  GI - Soft, nontender, nondistended, no masses or organomegaly.  Neurological - Grossly intact - normal speech, no focal findings  Musculoskeletal - No joint tenderness, deformity or swelling, no muscular tenderness noted.  Extremities - Peripheral pulses normal, no pedal edema, no clubbing or cyanosis.  Skin - Normal coloration and turgor.  Psych -  oriented to person, place, and time.    Medical problems and test results were reviewed with the patient today.     No results found for this or any previous visit (from the past 672 hour(s)).      EKG: (EKG has been independently visualized by me and summarized below)    ECG 12 Lead    Date/Time: 8/21/2023 1:14 PM  Performed by: Goran Wheeler PA  Authorized by: Goran Wheeler PA   Comparison: compared with previous ECG from 5/24/2023  Similar to previous ECG  Rhythm: sinus rhythm  Rate: normal  Conduction: conduction normal  ST Segments: ST segments normal  T Waves: T waves normal  QRS axis: normal    Clinical impression: normal ECG         ASSESSMENT   1. Palpitations: AT, PVC's. Controlled on Medications.     2. PAF: PVA 2016    3. HTN: Controlled    4. Flecainide use EKG stable.  QRS acceptable.    PLAN  Continue current medical therapy, return for follow-up in 6 months or sooner as needed.        8/20/2023  19:56 EDT    Electronically signed by ROBERT Ceja, 08/21/23, 1:15 PM EDT.

## 2023-08-21 ENCOUNTER — OFFICE VISIT (OUTPATIENT)
Dept: CARDIOLOGY | Facility: CLINIC | Age: 48
End: 2023-08-21
Payer: COMMERCIAL

## 2023-08-21 VITALS
BODY MASS INDEX: 29.08 KG/M2 | HEIGHT: 73 IN | HEART RATE: 83 BPM | DIASTOLIC BLOOD PRESSURE: 64 MMHG | SYSTOLIC BLOOD PRESSURE: 120 MMHG | OXYGEN SATURATION: 96 % | WEIGHT: 219.4 LBS

## 2023-08-21 DIAGNOSIS — I49.3 PVC'S (PREMATURE VENTRICULAR CONTRACTIONS): ICD-10-CM

## 2023-08-21 DIAGNOSIS — I48.0 PAROXYSMAL ATRIAL FIBRILLATION: Primary | ICD-10-CM

## 2023-08-21 PROCEDURE — 93000 ELECTROCARDIOGRAM COMPLETE: CPT | Performed by: PHYSICIAN ASSISTANT

## 2023-08-21 PROCEDURE — 99214 OFFICE O/P EST MOD 30 MIN: CPT | Performed by: PHYSICIAN ASSISTANT

## 2023-08-23 RX ORDER — AMLODIPINE BESYLATE 10 MG/1
10 TABLET ORAL DAILY
Qty: 90 TABLET | Refills: 3 | Status: SHIPPED | OUTPATIENT
Start: 2023-08-23

## 2023-10-04 DIAGNOSIS — I10 ESSENTIAL HYPERTENSION: ICD-10-CM

## 2023-10-04 RX ORDER — LOSARTAN POTASSIUM 50 MG/1
TABLET ORAL
Qty: 60 TABLET | Refills: 3 | Status: SHIPPED | OUTPATIENT
Start: 2023-10-04

## 2023-12-29 RX ORDER — FLECAINIDE ACETATE 50 MG/1
75 TABLET ORAL 2 TIMES DAILY
Qty: 90 TABLET | Refills: 6 | Status: SHIPPED | OUTPATIENT
Start: 2023-12-29

## 2024-01-03 DIAGNOSIS — I10 ESSENTIAL HYPERTENSION: ICD-10-CM

## 2024-01-08 RX ORDER — LOSARTAN POTASSIUM 50 MG/1
TABLET ORAL
Qty: 60 TABLET | Refills: 3 | Status: SHIPPED | OUTPATIENT
Start: 2024-01-08

## 2024-05-01 DIAGNOSIS — I10 ESSENTIAL HYPERTENSION: ICD-10-CM

## 2024-05-01 RX ORDER — LOSARTAN POTASSIUM 50 MG/1
50 TABLET ORAL 2 TIMES DAILY
Qty: 180 TABLET | Refills: 2 | Status: SHIPPED | OUTPATIENT
Start: 2024-05-01

## 2024-07-15 ENCOUNTER — TELEPHONE (OUTPATIENT)
Dept: CARDIOLOGY | Facility: CLINIC | Age: 49
End: 2024-07-15
Payer: COMMERCIAL

## 2024-07-15 NOTE — TELEPHONE ENCOUNTER
Caller: Rosalee Meléndez    Relationship to patient: Emergency Contact    Best call back number:[phone]    Type of visit: F/U APPT    Requested date: NEXT AVAILABLE EITHER EARLY MORNING OR LATE AFTERNOON     If rescheduling, when is the original appointment: 7-16-24     Additional notes:PLEASE CONTACT PATIENT WITH A SUITABLE DATE.

## 2024-08-01 RX ORDER — AMLODIPINE BESYLATE 10 MG/1
10 TABLET ORAL DAILY
Qty: 90 TABLET | Refills: 3 | Status: SHIPPED | OUTPATIENT
Start: 2024-08-01

## 2024-08-16 RX ORDER — FLECAINIDE ACETATE 50 MG/1
75 TABLET ORAL 2 TIMES DAILY
Qty: 270 TABLET | Refills: 0 | Status: SHIPPED | OUTPATIENT
Start: 2024-08-16

## 2024-09-23 RX ORDER — FLECAINIDE ACETATE 50 MG/1
75 TABLET ORAL 2 TIMES DAILY
Qty: 90 TABLET | Refills: 0 | Status: SHIPPED | OUTPATIENT
Start: 2024-09-23

## 2024-10-15 ENCOUNTER — OFFICE VISIT (OUTPATIENT)
Dept: CARDIOLOGY | Facility: CLINIC | Age: 49
End: 2024-10-15
Payer: COMMERCIAL

## 2024-10-15 VITALS
SYSTOLIC BLOOD PRESSURE: 124 MMHG | OXYGEN SATURATION: 97 % | BODY MASS INDEX: 29.04 KG/M2 | HEART RATE: 93 BPM | WEIGHT: 219.1 LBS | DIASTOLIC BLOOD PRESSURE: 64 MMHG | HEIGHT: 73 IN

## 2024-10-15 DIAGNOSIS — I47.10 PSVT (PAROXYSMAL SUPRAVENTRICULAR TACHYCARDIA): ICD-10-CM

## 2024-10-15 DIAGNOSIS — I49.3 PVC'S (PREMATURE VENTRICULAR CONTRACTIONS): Primary | ICD-10-CM

## 2024-10-15 DIAGNOSIS — I10 PRIMARY HYPERTENSION: ICD-10-CM

## 2024-10-15 DIAGNOSIS — I48.0 PAROXYSMAL ATRIAL FIBRILLATION: ICD-10-CM

## 2024-10-15 PROCEDURE — 99214 OFFICE O/P EST MOD 30 MIN: CPT | Performed by: PHYSICIAN ASSISTANT

## 2024-10-15 RX ORDER — CYCLOBENZAPRINE HCL 10 MG
10 TABLET ORAL 3 TIMES DAILY
COMMUNITY
Start: 2024-10-10 | End: 2024-10-15

## 2024-10-15 RX ORDER — BENZONATATE 100 MG/1
100 CAPSULE ORAL
COMMUNITY
Start: 2024-10-10 | End: 2024-10-17

## 2024-10-15 RX ORDER — PREDNISONE 20 MG/1
20 TABLET ORAL
COMMUNITY
Start: 2024-10-10 | End: 2024-10-19

## 2024-10-15 NOTE — PROGRESS NOTES
"[facility] at [facility]   OFFICE NOTE      Mikie Meléndez  1975  PCP: Heather Ibarra MD    SUBJECTIVE:   Mikie Meléndez is a 49 y.o. male seen for a follow up visit regarding the following:     CC: A-fib    HPI:   Pleasant 49-year male returns a follow-up regarding atrial fibrillation, Tambocor therapy.  Since last seen per office he is doing quite well.  He has had a few more Skiest \"skipped\" heartbeat since prednisone for his back pain.  He denies any sustained episodes that he is any atrial fibrillation far as he can tell.  He denies any chest pain dizziness near syncope.  Tolerates medication quite well.  He states his blood pressure is controlled on current medical therapy.  .    Cardiac PMH: (Old records have been reviewed and summarized below)  Paroxysmal Atrial Fibrillation   History of previous stress testing and Holter monitoring, data insufficient.   Sinus rhythm by EKG.   Holter monitor showing sinus rhythm. Rare PVCs. One short run of atrial tachycardia. No AV block or pauses.  Echocardiogram with a preliminary report, 12/29/2014. EF 55% to 60%. Left atrium at 3.7 cm.  Dec 31 st diagnosis of Afib with RVR. ECV at  on jan 1st with initiation of Xarelto; stopped and changed to Eliquis   Echocardiogram 5/19/2016: EF 40-45%. Mild MR. Mild physiologic TR present. ? Tachy induced CMP  Cardiac catheterization 6/2/2016: EF 65%. Normal left ventricular function. Geographically normal coronary arteries.  Echocardiogram 6/24/2016: EF 50%. No thrombus or mass detected in left atrium or left atrial appendage. Mild left atrial enlargement. Trace intermittent MR.  Event Monitor 5/24/2016-6/23/2016 showing runs of PVCs and PACs (half time PAC and other PVCs). No Afib. At times symptoms of palpitations, SOB correlated with ectopy and at other times NSR with no abnormalities.   Cardioversion in January and June 2016. Started on Flec 50 mg BID, Toprol and Eliquis  S/p PVA 9/28/16, " "SD  Event monitor from 3/7-4/5/18 demonstrated mostly NSR with PVCs and a couple short runs of PSVT both of which were associated with skipped beats, racing heart and dizziness, subsequently re-started on Flec.   Dyslipidemia.   Excessive caffeine intake.   Family history of tachycardia    Past Medical History, Past Surgical History, Family history, Social History, and Medications were all reviewed with the patient today and updated as necessary.       Current Outpatient Medications:     amLODIPine (NORVASC) 10 MG tablet, TAKE 1 TABLET BY MOUTH DAILY. MUST KEEP APPT- 8/21/23 AT 9:30, Disp: 90 tablet, Rfl: 3    aspirin 81 MG EC tablet, Take 1 tablet by mouth Daily., Disp: , Rfl:     atorvastatin (LIPITOR) 40 MG tablet, Take 1 tablet by mouth every night at bedtime., Disp: , Rfl:     benzonatate (TESSALON) 100 MG capsule, Take 1 capsule by mouth., Disp: , Rfl:     cyclobenzaprine (FLEXERIL) 10 MG tablet, Take 1 tablet by mouth 3 (Three) Times a Day., Disp: , Rfl:     flecainide (TAMBOCOR) 50 MG tablet, Take 1.5 tablets by mouth 2 (Two) Times a Day. APPOINTMENT NEEDED FOR ADDITIONAL REFILLS., Disp: 90 tablet, Rfl: 0    losartan (COZAAR) 50 MG tablet, Take 1 tablet by mouth 2 (Two) Times a Day., Disp: 180 tablet, Rfl: 2    predniSONE (DELTASONE) 20 MG tablet, Take 1 tablet by mouth., Disp: , Rfl:       No Known Allergies      PHYSICAL EXAM:    /64 (BP Location: Left arm, Patient Position: Sitting, Cuff Size: Adult)   Pulse 93   Ht 185.4 cm (72.99\")   Wt 99.4 kg (219 lb 1.6 oz)   SpO2 97%   BMI 28.91 kg/m²        Wt Readings from Last 5 Encounters:   10/15/24 99.4 kg (219 lb 1.6 oz)   08/21/23 99.5 kg (219 lb 6.4 oz)   05/24/22 102 kg (225 lb)   04/27/22 104 kg (229 lb 4.5 oz)   04/12/22 104 kg (229 lb 4.5 oz)       BP Readings from Last 5 Encounters:   10/15/24 124/64   08/21/23 120/64   05/24/22 (!) 138/102   04/08/22 121/83   09/23/21 115/78       General appearance - Alert, well appearing, and in no " distress   Mental status - Affect appropriate to mood.  Eyes - Sclerae anicteric,  ENMT - Hearing grossly normal bilaterally, Dental hygiene good.  Neck - Carotids upstroke normal bilaterally, no bruits, no JVD.  Resp - Clear to auscultation, no wheezes, rales or rhonchi, symmetric air entry.  Heart - Normal rate, regular rhythm, normal S1, S2, no murmurs, rubs, clicks or gallops.  GI - Soft, nontender, nondistended, no masses or organomegaly.  Neurological - Grossly intact - normal speech, no focal findings  Musculoskeletal - No joint tenderness, deformity or swelling, no muscular tenderness noted.  Extremities - Peripheral pulses normal, no pedal edema, no clubbing or cyanosis.  Skin - Normal coloration and turgor.  Psych -  oriented to person, place, and time.    Medical problems and test results were reviewed with the patient today.     Recent Results (from the past 4 weeks)   HIV-1/O/2 ANTIGEN/ANTIBODY    Collection Time: 09/26/24  8:18 AM    Specimen: Blood, Venous Line   Result Value Ref Range    HIV Screen 4th Gen w/RFX (Reference) Non Reactive Non Reactive   HEPATITIS C ANTIBODY    Collection Time: 09/26/24  8:18 AM    Specimen: Blood, Venous Line   Result Value Ref Range    HCV Qual Negative Negative   VITAMIN D,25-HYDROXY    Collection Time: 09/26/24  8:18 AM    Specimen: Blood, Venous Line   Result Value Ref Range    25 Hydroxy, Vitamin D 44.6 20.0 - 80.0 ng/mL   TSH    Collection Time: 09/26/24  8:18 AM    Specimen: Blood, Venous Line   Result Value Ref Range    TSH Pregnancy 0.82 0.40 - 4.20 uIU/mL   PSA DIAGNOSTIC    Collection Time: 09/26/24  8:18 AM    Specimen: Blood, Venous Line   Result Value Ref Range    PSA 0.61 0.00 - 3.50 ng/mL   HEMOGLOBIN A1C    Collection Time: 09/26/24  8:18 AM    Specimen: Blood, Venous Line   Result Value Ref Range    Hemoglobin A1C 5.4 <5.7 %   CBC (NO DIFF)    Collection Time: 09/26/24  8:18 AM    Specimen: Blood, Venous Line   Result Value Ref Range    WBC 6.70 3.70  - 10.30 10*3/uL    RBC 4.96 4.60 - 6.10 10*6/uL    Hemoglobin 15.8 13.7 - 17.5 g/dL    Hematocrit 45.7 40.0 - 51.0 %    Platelets 226 155 - 369 10*3/uL    MCV 92 79 - 98 fL    MCH 31.9 26.0 - 32.0 pg    MCHC 34.6 30.7 - 35.5 g/dL    RDW 11.9 11.5 - 14.5 %    MPV 12.5 8.8 - 12.5 fL    nRBC 0.0 <=0.0 per 100 WBCs   SARS-CoV-2, Flu A, Flu B, and RSV    Collection Time: 10/10/24  1:04 PM    Specimen: Nasopharynx; Swab   Result Value Ref Range    SARS-CoV-2, YONIS Not Detected Not Detected    Influenza A PCR Not Detected Not Detected    Influenza B PCR Not Detected Not Detected    RSV, PCR Not Detected Not Detected         EKG: (EKG has been independently visualized by me and summarized below)  Procedures   EK    G today normal sinus rhythm heart rate 93 bpm.  QRS acceptable at 88 ms  ASSESSMENT   1. Palpitations: AT, PVC's.  Some increased recently on prednisone.    2. PAF: PVA 2016, no clinical recurrence of A-fib.  Chads Vascor equal to 1, as needed Eliquis therapy.    3. HTN: Controlled on Norvasc and losartan    4. Flecainide use EKG stable.  QRS acceptable.  Continue current medical therapy    PLAN  Overall doing well on flecainide therapy, we discussed if he has breakthrough A-fib considering a redo ablation procedure he would like to consider this.  For now he will stay on medical therapy and focus on risk factors keep blood pressure control diet exercise weight loss.  He will return follow-up or office in 6 months or sooner as needed.      10/15/2024  15:17 EDT    Electronically signed by ROBERT Ceja, 08/21/23, 1:15 PM EDT.

## 2024-12-17 RX ORDER — FLECAINIDE ACETATE 50 MG/1
75 TABLET ORAL 2 TIMES DAILY
Qty: 270 TABLET | Refills: 3 | Status: SHIPPED | OUTPATIENT
Start: 2024-12-17

## 2024-12-17 NOTE — TELEPHONE ENCOUNTER
Caller: MeléndezRosalee    Relationship: Emergency Contact    Best call back number: [phone]    Requested Prescriptions:   Requested Prescriptions     Pending Prescriptions Disp Refills    flecainide (TAMBOCOR) 50 MG tablet 90 tablet 0     Sig: Take 1.5 tablets by mouth 2 (Two) Times a Day. APPOINTMENT NEEDED FOR ADDITIONAL REFILLS.        Pharmacy where request should be sent: [facility] #[id number] - [place], [place] - [address] [place] [address] AT [address] [phone] [facility] [phone] FX     Last office visit with prescribing clinician: 8/21/2023   Last telemedicine visit with prescribing clinician: Visit date not found   Next office visit with prescribing clinician: Visit date not found     Additional details provided by patient:     Does the patient have less than a 3 day supply:  [x] Yes  [] No      Aguilar Oliveira Rep   12/17/24 09:02 EST

## 2024-12-18 ENCOUNTER — TELEPHONE (OUTPATIENT)
Dept: CARDIOLOGY | Facility: CLINIC | Age: 49
End: 2024-12-18
Payer: COMMERCIAL

## 2024-12-18 DIAGNOSIS — I48.0 PAROXYSMAL ATRIAL FIBRILLATION: Primary | ICD-10-CM

## 2024-12-18 NOTE — TELEPHONE ENCOUNTER
Patient and spouse agreeable to monitor. Also, as a precaution, they would like to know if he should start taking his eliquis again.

## 2024-12-18 NOTE — TELEPHONE ENCOUNTER
Called patient back and he reports palpitations and excessive belching. Mentions that this seems more intense after taking his medications. Says this goes away after 2-3 hours, but has the fluttering feeling in his chest and belching episode. Denies SOB/chest pain/dizziness, reports normal BP. HR increase during these episodes tends to be into the mid to upper 90s. Pt wondering if medication could be causing this or if it may be something else.

## 2024-12-18 NOTE — TELEPHONE ENCOUNTER
Patient's spouse, Rosalee, left voicemail stating patient was experiencing some palpations along with excessive belching and was inquiring if we may know why. Attempted to call them back, however, it went to voicemail. Message left with callback number.

## 2024-12-26 DIAGNOSIS — R00.2 PALPITATIONS: Primary | ICD-10-CM

## 2024-12-26 DIAGNOSIS — I48.91 ATRIAL FIBRILLATION, UNSPECIFIED TYPE: ICD-10-CM

## 2025-01-03 ENCOUNTER — TELEPHONE (OUTPATIENT)
Dept: CARDIOLOGY | Facility: CLINIC | Age: 50
End: 2025-01-03
Payer: COMMERCIAL

## 2025-01-03 DIAGNOSIS — I48.0 PAROXYSMAL ATRIAL FIBRILLATION: ICD-10-CM

## 2025-01-03 DIAGNOSIS — R00.2 PALPITATIONS: Primary | ICD-10-CM

## 2025-01-03 NOTE — TELEPHONE ENCOUNTER
Patient's spouse called and states the patient has been experiencing SOA and palpitations. She states he has been unable to sleep in his bed and has to sleep in his recliner due to palpitations being worse when he's laying flat. Patient BP today is 128/74 and HR 74.     Patient will have an EKG performed at [facility] in [place].     Patient's spouse also states the patient has not received holter monitor that was supposed to be mailed over a week ago. Transferred her to holter clinic to follow up.

## 2025-01-24 ENCOUNTER — TELEPHONE (OUTPATIENT)
Dept: CARDIOLOGY | Facility: CLINIC | Age: 50
End: 2025-01-24

## 2025-01-25 LAB
CV ZIO BASELINE AVG BPM: 85 BPM
CV ZIO BASELINE BPM HIGH: 171 BPM
CV ZIO BASELINE BPM LOW: 64 BPM
CV ZIO DEVICE ANALYSIS TIME: NORMAL
CV ZIO ECT SVE COUNT: 72 EPISODES
CV ZIO ECT SVE CPLT COUNT: 4 EPISODES
CV ZIO ECT SVE CPLT FREQ: NORMAL
CV ZIO ECT SVE FREQ: NORMAL
CV ZIO ECT SVE TPLT COUNT: 0 EPISODES
CV ZIO ECT SVE TPLT FREQ: 0
CV ZIO ECT VE COUNT: 158 EPISODES
CV ZIO ECT VE CPLT COUNT: 4 EPISODES
CV ZIO ECT VE CPLT FREQ: NORMAL
CV ZIO ECT VE FREQ: NORMAL
CV ZIO ECT VE TPLT COUNT: 1 EPISODES
CV ZIO ECT VE TPLT FREQ: NORMAL
CV ZIO ECTOPIC SVE COUPLET RAW PERCENT: 0 %
CV ZIO ECTOPIC SVE ISOLATED PERCENT: 0.02 %
CV ZIO ECTOPIC SVE TRIPLET RAW PERCENT: 0 %
CV ZIO ECTOPIC VE COUPLET RAW PERCENT: 0 %
CV ZIO ECTOPIC VE ISOLATED PERCENT: 0.04 %
CV ZIO ECTOPIC VE TRIPLET RAW PERCENT: 0 %
CV ZIO ENROLLMENT END: NORMAL
CV ZIO ENROLLMENT START: NORMAL
CV ZIO PATIENT EVENTS DIARIES: 0
CV ZIO PATIENT EVENTS TRIGGERS: 10
CV ZIO PAUSE COUNT: 0
CV ZIO PRESCRIPTION STATUS: NORMAL
CV ZIO SVT AVG BPM: 127 BPM
CV ZIO SVT BPM HIGH: 162 BPM
CV ZIO SVT BPM LOW: 83 BPM
CV ZIO SVT COUNT: 2
CV ZIO SVT F EPI AVG BPM: 124 BPM
CV ZIO SVT F EPI BEATS: 9 BEATS
CV ZIO SVT F EPI BPM HIGH: 162 BPM
CV ZIO SVT F EPI BPM LOW: 83 BPM
CV ZIO SVT F EPI DUR: 4.5 SEC
CV ZIO SVT F EPI END: NORMAL
CV ZIO SVT F EPI START: NORMAL
CV ZIO SVT L EPI AVG BPM: 131 BPM
CV ZIO SVT L EPI BEATS: 14 BEATS
CV ZIO SVT L EPI BPM HIGH: 156 BPM
CV ZIO SVT L EPI BPM LOW: 113 BPM
CV ZIO SVT L EPI DUR: 6.5 SEC
CV ZIO SVT L EPI END: NORMAL
CV ZIO SVT L EPI START: NORMAL
CV ZIO SVT SYMPT IN PT: NORMAL
CV ZIO TOTAL  ENROLLMENT PERIOD: NORMAL
CV ZIO VT AVG BPM: 161 BPM
CV ZIO VT BPM HIGH: 171 BPM
CV ZIO VT BPM LOW: 146 BPM
CV ZIO VT COUNT: 1
CV ZIO VT F EPI AVG BPM: 161
CV ZIO VT F EPI BEATS: 8 BEATS
CV ZIO VT F EPI BPM HIGH: 171
CV ZIO VT F EPI BPM LOW: 146
CV ZIO VT F EPI DUR: 3.2 SEC
CV ZIO VT F EPI END: NORMAL
CV ZIO VT F EPI START: NORMAL
CV ZIO VT L EPI AVG BPM: 161
CV ZIO VT L EPI BEATS: 8 BEATS
CV ZIO VT L EPI BPM HIGH: 171 BPM
CV ZIO VT L EPI BPM LOW: 146 BPM
CV ZIO VT L EPI DUR: 3.2
CV ZIO VT L EPI END: NORMAL
CV ZIO VT L EPI START: NORMAL

## 2025-01-30 DIAGNOSIS — I10 ESSENTIAL HYPERTENSION: ICD-10-CM

## 2025-01-30 RX ORDER — LOSARTAN POTASSIUM 50 MG/1
50 TABLET ORAL 2 TIMES DAILY
Qty: 180 TABLET | Refills: 2 | Status: SHIPPED | OUTPATIENT
Start: 2025-01-30

## 2025-01-31 ENCOUNTER — TELEPHONE (OUTPATIENT)
Dept: CARDIOLOGY | Facility: CLINIC | Age: 50
End: 2025-01-31
Payer: COMMERCIAL

## 2025-01-31 NOTE — TELEPHONE ENCOUNTER
Gus Campo MD Edelen, Justina, RN  I sent the patient the following navabi message which they did not read. Can you let them know.       I reviewed your monitor.  There is no atrial fibrillation which is good news.  You do not have occasional times where the heart would speed up for a few seconds at a time.  These usually would last around 3 to 7 seconds.  Several of the times you triggered the monitor this was associated with extra heartbeats.  Some of the triggers were also associated with normal rhythm.  Depending on how much your symptoms bother you, we could consider going up on the flecainide to see if that helps calm down some of these extra beats.  Let me know if you have any questions.          Patient notified and voices understanding. He is currently not symptomatic. If he develops symptoms that are bothersome he will contact our office.

## 2025-04-29 ENCOUNTER — TELEPHONE (OUTPATIENT)
Dept: GASTROENTEROLOGY | Facility: CLINIC | Age: 50
End: 2025-04-29

## 2025-04-29 NOTE — TELEPHONE ENCOUNTER
Caller: Rosalee Meléndez    Relationship to patient: Emergency Contact    Best call back number:  [phone]    Chief complaint: PT WILL BE OUT OF TOWN    Type of visit: COLONOSCOPY    Requested date: SOONER IF POSSIBLE     If rescheduling, when is the original appointment: 06/02/2025     Additional notes:PLEASE REACH OUT TO PT'S WIFE TO RESCHEDULE

## 2025-06-09 ENCOUNTER — TELEPHONE (OUTPATIENT)
Dept: CARDIOLOGY | Facility: CLINIC | Age: 50
End: 2025-06-09

## 2025-06-09 RX ORDER — SODIUM, POTASSIUM,MAG SULFATES 17.5-3.13G
1 SOLUTION, RECONSTITUTED, ORAL ORAL TAKE AS DIRECTED
Qty: 354 ML | Refills: 0 | Status: SHIPPED | OUTPATIENT
Start: 2025-06-09

## 2025-06-09 NOTE — TELEPHONE ENCOUNTER
Caller: Rosalee Meléndez    Relationship: Emergency Contact    Best call back number: [phone]    What is the best time to reach you: ANY    Who are you requesting to speak with (clinical staff, provider,  specific staff member): ANY    What was the call regarding: PT WENT ON [facility] AND SAID HIS HEART MONITOR TOLD HIM HE WAS IN A FIB FOR A LONG TIME AND HIS BP HAS BEEN SUPER HIGH (148/107) WHEN PT WOKE UP THIS MORNING IT /106. PT IS STILL HAVING A IRREGULAR HEART RATE.     Is it okay if the provider responds through MyChart: NO

## 2025-06-09 NOTE — TELEPHONE ENCOUNTER
Spoke with patient's spouse. She states they were recently on vacation and patient went into what he believes to be afib. She states the patient was out of rhythm for 3 days consistently with HR ranging  bpm. She states he did experience shortness of breath, dizziness, and fatigue.     She states he is currently in rhythm but will spontaneously go out of rhythm and each episode will last around 20 minutes.     She states patient BP has also been elevated. Most recent BP scott 138/106.     Patient currently takes flecainide 75 mg BID.     Patient is not anticoagulated.     Please advise.    Also, patient would like to reschedule May 6 appointment as they had to cancel.

## 2025-06-10 NOTE — TELEPHONE ENCOUNTER
Appt has been scheduled with Red Wheeler PA-C due to Dr. Campo out the rest of the week. Patient did not want to wait till next week to see Dr. Campo.

## 2025-06-10 NOTE — TELEPHONE ENCOUNTER
Caller: Rosalee Meléndez    Relationship: Emergency Contact    Best call back number: [phone]    What is the best time to reach you: ANY    Who are you requesting to speak with (clinical staff, provider,  specific staff member): MARIO ALBERTO      What was the call regarding: PATIENT'S WIFE CALLED REQUESTING A CALL BACK FROM MARIO ALBERTO IN REGARDS TO THE PATIENT'S A-FIB THAT WAS DISCUSSED ON 6-9-25. PLEASE CONTACT PATIENT'S WIFE BACK AT YOUR EARLIEST CONVENIENCE.    Is it okay if the provider responds through Cardiosonichart: PLEASE CALL

## 2025-06-11 ENCOUNTER — OFFICE VISIT (OUTPATIENT)
Dept: CARDIOLOGY | Facility: CLINIC | Age: 50
End: 2025-06-11
Payer: COMMERCIAL

## 2025-06-11 VITALS
BODY MASS INDEX: 28.18 KG/M2 | SYSTOLIC BLOOD PRESSURE: 112 MMHG | HEIGHT: 73 IN | DIASTOLIC BLOOD PRESSURE: 84 MMHG | HEART RATE: 97 BPM | OXYGEN SATURATION: 97 % | WEIGHT: 212.6 LBS

## 2025-06-11 DIAGNOSIS — I48.0 PAROXYSMAL ATRIAL FIBRILLATION: Primary | ICD-10-CM

## 2025-06-11 DIAGNOSIS — D72.18 EOSINOPHILIC MYOCARDITIS: ICD-10-CM

## 2025-06-11 DIAGNOSIS — I40.1 EOSINOPHILIC MYOCARDITIS: ICD-10-CM

## 2025-06-11 PROCEDURE — 93000 ELECTROCARDIOGRAM COMPLETE: CPT | Performed by: PHYSICIAN ASSISTANT

## 2025-06-11 PROCEDURE — 99214 OFFICE O/P EST MOD 30 MIN: CPT | Performed by: PHYSICIAN ASSISTANT

## 2025-06-11 RX ORDER — METOPROLOL TARTRATE 25 MG/1
25 TABLET, FILM COATED ORAL 2 TIMES DAILY
Qty: 60 TABLET | Refills: 11 | Status: SHIPPED | OUTPATIENT
Start: 2025-06-11

## 2025-06-11 RX ORDER — AMLODIPINE BESYLATE 5 MG/1
5 TABLET ORAL DAILY
Qty: 30 TABLET | Refills: 6 | Status: SHIPPED | OUTPATIENT
Start: 2025-06-11

## 2025-06-11 NOTE — PROGRESS NOTES
[facility] at [facility]   OFFICE NOTE      Mikie Meléndez  1975  PCP: Heather Ibarra MD    SUBJECTIVE:   Mikie Meléndez is a 50 y.o. male seen for a follow up visit regarding the following:     CC: A-fib    HPI:   Pleasant 50-year male returns a follow-up regarding atrial fibrillation, Tambocor therapy.  Since last seen per office he has had some increasing palpitations off-and-on.  Most recently he went to [facility] for vacation while was there he was having episodes where he felt like his heart was out of rhythm for about an hour.  His heart rate varied between 110 115 for a few minutes up to an hour length time.  He did take his Eliquis for few days and this eventually resolved.  Now he thinks it is more rare in nature.  This comes and goes and has over the past.  Previous monitor in January did not reveal A-fib but episodes of brief atrial tachycardia and some PVCs.  He has not had any chest pain dizziness near syncope.  Worked hard to lose weight and stays physically active exercising on a regular basis.      Cardiac PMH: (Old records have been reviewed and summarized below)  Paroxysmal Atrial Fibrillation   History of previous stress testing and Holter monitoring, data insufficient.   Sinus rhythm by EKG.   Holter monitor showing sinus rhythm. Rare PVCs. One short run of atrial tachycardia. No AV block or pauses.  Echocardiogram with a preliminary report, 12/29/2014. EF 55% to 60%. Left atrium at 3.7 cm.  Dec 31 st diagnosis of Afib with RVR. ECV at  on jan 1st with initiation of Xarelto; stopped and changed to Eliquis   Echocardiogram 5/19/2016: EF 40-45%. Mild MR. Mild physiologic TR present. ? Tachy induced CMP  Cardiac catheterization 6/2/2016: EF 65%. Normal left ventricular function. normal coronary arteries.  Echocardiogram 6/24/2016: EF 50%. No thrombus or mass detected in left atrium or left atrial appendage. Mild left atrial enlargement. Trace intermittent  "MR.  Event Monitor 5/24/2016-6/23/2016 showing runs of PVCs and PACs (half time PAC and other PVCs). No Afib. At times symptoms of palpitations, SOB correlated with ectopy and at other times NSR with no abnormalities.   Cardioversion in January and June 2016. Started on Flec 50 mg BID, Toprol and Eliquis  S/p PVA 9/28/16, SD  Event monitor from 3/7-4/5/18 demonstrated mostly NSR with PVCs and a couple short runs of PSVT both of which were associated with skipped beats, racing heart and dizziness, subsequently re-started on Flec.   HOLTER, 48 hours 1/2025, No Afib, Rare Atrial tachycardia, NSVT, Occasional PVC's, PACS 1%. Longest AT 6.5 seconds.   Dyslipidemia.   Excessive caffeine intake.   Family history of tachycardia  Hypertension, amlodipine therapy, losartan    Past Medical History, Past Surgical History, Family history, Social History, and Medications were all reviewed with the patient today and updated as necessary.       Current Outpatient Medications:     amLODIPine (NORVASC) 5 MG tablet, Take 1 tablet by mouth Daily. MUST KEEP APPT- 8/21/23 AT 9:30, Disp: 30 tablet, Rfl: 6    aspirin 81 MG EC tablet, Take 1 tablet by mouth Daily., Disp: , Rfl:     flecainide (TAMBOCOR) 50 MG tablet, Take 1.5 tablets by mouth 2 (Two) Times a Day., Disp: 270 tablet, Rfl: 3    losartan (COZAAR) 50 MG tablet, TAKE 1 TABLET BY MOUTH TWICE A DAY, Disp: 180 tablet, Rfl: 2    metoprolol tartrate (LOPRESSOR) 25 MG tablet, Take 1 tablet by mouth 2 (Two) Times a Day., Disp: 60 tablet, Rfl: 11    sodium-potassium-magnesium sulfates (Suprep Bowel Prep Kit) 17.5-3.13-1.6 GM/177ML solution oral solution, Take 1 bottle by mouth Take As Directed. (Patient not taking: Reported on 6/11/2025), Disp: 354 mL, Rfl: 0      No Known Allergies      PHYSICAL EXAM:    /84 (BP Location: Right arm, Patient Position: Sitting)   Pulse 97   Ht 185.4 cm (73\")   Wt 96.4 kg (212 lb 9.6 oz)   SpO2 97%   BMI 28.05 kg/m²        Wt Readings from Last " 5 Encounters:   06/11/25 96.4 kg (212 lb 9.6 oz)   10/15/24 99.4 kg (219 lb 1.6 oz)   08/21/23 99.5 kg (219 lb 6.4 oz)   05/24/22 102 kg (225 lb)   04/27/22 104 kg (229 lb 4.5 oz)       BP Readings from Last 5 Encounters:   06/11/25 112/84   10/15/24 124/64   08/21/23 120/64   05/24/22 (!) 138/102   04/08/22 121/83       General appearance - Alert, well appearing, and in no distress   Mental status - Affect appropriate to mood.  Eyes - Sclerae anicteric,  ENMT - Hearing grossly normal bilaterally, Dental hygiene good.  Neck - Carotids upstroke normal bilaterally, no bruits, no JVD.  Resp - Clear to auscultation, no wheezes, rales or rhonchi, symmetric air entry.  Heart - Normal rate, regular rhythm, normal S1, S2, no murmurs, rubs, clicks or gallops.  GI - Soft, nontender, nondistended, no masses or organomegaly.  Neurological - Grossly intact - normal speech, no focal findings  Musculoskeletal - No joint tenderness, deformity or swelling, no muscular tenderness noted.  Extremities - Peripheral pulses normal, no pedal edema, no clubbing or cyanosis.  Skin - Normal coloration and turgor.  Psych -  oriented to person, place, and time.    Medical problems and test results were reviewed with the patient today.     No results found for this or any previous visit (from the past 4 weeks).        EKG: (EKG has been independently visualized by me and summarized below)    ECG 12 Lead    Date/Time: 6/11/2025 9:20 AM  Performed by: Goran Wheeler PA    Authorized by: Goran Wheeler PA  Comparison: compared with previous ECG from 10/14/2025  Similar to previous ECG  Rhythm: sinus rhythm  Rate: normal  Conduction: conduction normal  ST Segments: ST segments normal  QRS axis: normal    Clinical impression: normal ECG             ASSESSMENT   1. Palpitations: AT, PVC's.  Intermittent episodes recent breakthrough events while on vacation.  Now symptoms resolved.      2. PAF: PVA 2016, no clinical recurrence of A-fib.   Chads Vascor equal to 1, as needed Eliquis therapy.  Most recent monitor in January did not reveal A-fib but actually atrial tachycardia.    3. HTN: Controlled on Norvasc and losartan    4. Flecainide use EKG stable.  QRS acceptable.  Continue current medical therapy    PLAN  Add Lopressor 25 mg twice daily with flecainide therapy.  Reduce amlodipine to 5 mg daily.  His blood pressure well-controlled he is lost a lot of weight continue exercise and a basis.  We again discussed options of ablation procedure like defer for now return follow-up or office in 6 months.      6/11/2025  09:29 EDT  Electronically signed by ROBERT Ceja, 06/11/25, 9:30 AM EDT.

## 2025-06-12 ENCOUNTER — PREP FOR SURGERY (OUTPATIENT)
Dept: OTHER | Facility: HOSPITAL | Age: 50
End: 2025-06-12
Payer: COMMERCIAL

## 2025-06-12 DIAGNOSIS — Z12.11 SCREENING FOR COLON CANCER: Primary | ICD-10-CM

## 2025-06-18 ENCOUNTER — ANESTHESIA EVENT (OUTPATIENT)
Dept: GASTROENTEROLOGY | Facility: HOSPITAL | Age: 50
End: 2025-06-18
Payer: COMMERCIAL

## 2025-06-18 ENCOUNTER — TELEPHONE (OUTPATIENT)
Dept: GASTROENTEROLOGY | Facility: CLINIC | Age: 50
End: 2025-06-18
Payer: COMMERCIAL

## 2025-06-19 ENCOUNTER — HOSPITAL ENCOUNTER (OUTPATIENT)
Facility: HOSPITAL | Age: 50
Setting detail: HOSPITAL OUTPATIENT SURGERY
Discharge: HOME | End: 2025-06-19
Attending: INTERNAL MEDICINE | Admitting: INTERNAL MEDICINE
Payer: COMMERCIAL

## 2025-06-19 ENCOUNTER — ANESTHESIA (OUTPATIENT)
Dept: GASTROENTEROLOGY | Facility: HOSPITAL | Age: 50
End: 2025-06-19
Payer: COMMERCIAL

## 2025-06-19 VITALS
HEART RATE: 63 BPM | DIASTOLIC BLOOD PRESSURE: 84 MMHG | RESPIRATION RATE: 18 BRPM | OXYGEN SATURATION: 95 % | TEMPERATURE: 97.3 F | SYSTOLIC BLOOD PRESSURE: 116 MMHG

## 2025-06-19 DIAGNOSIS — Z12.11 SCREENING FOR COLON CANCER: ICD-10-CM

## 2025-06-19 PROCEDURE — 25010000002 PROPOFOL 10 MG/ML EMULSION

## 2025-06-19 PROCEDURE — 45385 COLONOSCOPY W/LESION REMOVAL: CPT | Performed by: INTERNAL MEDICINE

## 2025-06-19 PROCEDURE — 88305 TISSUE EXAM BY PATHOLOGIST: CPT | Performed by: INTERNAL MEDICINE

## 2025-06-19 PROCEDURE — 25010000002 LIDOCAINE PF 1% 1 % SOLUTION

## 2025-06-19 PROCEDURE — S0260 H&P FOR SURGERY: HCPCS | Performed by: INTERNAL MEDICINE

## 2025-06-19 PROCEDURE — 25810000003 LACTATED RINGERS PER 1000 ML

## 2025-06-19 PROCEDURE — 25010000002 FAMOTIDINE 10 MG/ML SOLUTION: Performed by: ANESTHESIOLOGY

## 2025-06-19 RX ORDER — MIDAZOLAM HYDROCHLORIDE 1 MG/ML
1 INJECTION, SOLUTION INTRAMUSCULAR; INTRAVENOUS
Status: DISCONTINUED | OUTPATIENT
Start: 2025-06-19 | End: 2025-06-19 | Stop reason: HOSPADM

## 2025-06-19 RX ORDER — L.ACID,CASEI/B.ANIMAL/S.THERMO 16B CELL
1 CAPSULE ORAL DAILY
COMMUNITY

## 2025-06-19 RX ORDER — LIDOCAINE HYDROCHLORIDE 10 MG/ML
0.5 INJECTION, SOLUTION EPIDURAL; INFILTRATION; INTRACAUDAL; PERINEURAL ONCE AS NEEDED
Status: DISCONTINUED | OUTPATIENT
Start: 2025-06-19 | End: 2025-06-19 | Stop reason: HOSPADM

## 2025-06-19 RX ORDER — FAMOTIDINE 10 MG/ML
20 INJECTION, SOLUTION INTRAVENOUS ONCE
Status: COMPLETED | OUTPATIENT
Start: 2025-06-19 | End: 2025-06-19

## 2025-06-19 RX ORDER — SODIUM CHLORIDE 0.9 % (FLUSH) 0.9 %
10 SYRINGE (ML) INJECTION EVERY 12 HOURS SCHEDULED
Status: DISCONTINUED | OUTPATIENT
Start: 2025-06-19 | End: 2025-06-19 | Stop reason: HOSPADM

## 2025-06-19 RX ORDER — SODIUM CHLORIDE, SODIUM LACTATE, POTASSIUM CHLORIDE, CALCIUM CHLORIDE 600; 310; 30; 20 MG/100ML; MG/100ML; MG/100ML; MG/100ML
INJECTION, SOLUTION INTRAVENOUS CONTINUOUS PRN
Status: DISCONTINUED | OUTPATIENT
Start: 2025-06-19 | End: 2025-06-19 | Stop reason: SURG

## 2025-06-19 RX ORDER — LANOLIN ALCOHOL/MO/W.PET/CERES
1000 CREAM (GRAM) TOPICAL EVERY OTHER DAY
COMMUNITY

## 2025-06-19 RX ORDER — LIDOCAINE HYDROCHLORIDE 10 MG/ML
INJECTION, SOLUTION EPIDURAL; INFILTRATION; INTRACAUDAL; PERINEURAL AS NEEDED
Status: DISCONTINUED | OUTPATIENT
Start: 2025-06-19 | End: 2025-06-19 | Stop reason: SURG

## 2025-06-19 RX ORDER — PROPOFOL 10 MG/ML
VIAL (ML) INTRAVENOUS AS NEEDED
Status: DISCONTINUED | OUTPATIENT
Start: 2025-06-19 | End: 2025-06-19 | Stop reason: SURG

## 2025-06-19 RX ORDER — SODIUM CHLORIDE 0.9 % (FLUSH) 0.9 %
10 SYRINGE (ML) INJECTION AS NEEDED
Status: DISCONTINUED | OUTPATIENT
Start: 2025-06-19 | End: 2025-06-19 | Stop reason: HOSPADM

## 2025-06-19 RX ORDER — IPRATROPIUM BROMIDE AND ALBUTEROL SULFATE 2.5; .5 MG/3ML; MG/3ML
3 SOLUTION RESPIRATORY (INHALATION) ONCE AS NEEDED
Status: DISCONTINUED | OUTPATIENT
Start: 2025-06-19 | End: 2025-06-19 | Stop reason: HOSPADM

## 2025-06-19 RX ORDER — FAMOTIDINE 20 MG/1
20 TABLET, FILM COATED ORAL ONCE
Status: DISCONTINUED | OUTPATIENT
Start: 2025-06-19 | End: 2025-06-19 | Stop reason: HOSPADM

## 2025-06-19 RX ORDER — ONDANSETRON 2 MG/ML
4 INJECTION INTRAMUSCULAR; INTRAVENOUS ONCE AS NEEDED
Status: DISCONTINUED | OUTPATIENT
Start: 2025-06-19 | End: 2025-06-19 | Stop reason: HOSPADM

## 2025-06-19 RX ORDER — SODIUM CHLORIDE, SODIUM LACTATE, POTASSIUM CHLORIDE, CALCIUM CHLORIDE 600; 310; 30; 20 MG/100ML; MG/100ML; MG/100ML; MG/100ML
9 INJECTION, SOLUTION INTRAVENOUS CONTINUOUS
Status: DISCONTINUED | OUTPATIENT
Start: 2025-06-20 | End: 2025-06-19 | Stop reason: HOSPADM

## 2025-06-19 RX ADMIN — PROPOFOL 50 MG: 10 INJECTION, EMULSION INTRAVENOUS at 08:02

## 2025-06-19 RX ADMIN — SODIUM CHLORIDE, POTASSIUM CHLORIDE, SODIUM LACTATE AND CALCIUM CHLORIDE: 600; 310; 30; 20 INJECTION, SOLUTION INTRAVENOUS at 07:59

## 2025-06-19 RX ADMIN — PROPOFOL 200 MCG/KG/MIN: 10 INJECTION, EMULSION INTRAVENOUS at 08:03

## 2025-06-19 RX ADMIN — FAMOTIDINE 20 MG: 10 INJECTION, SOLUTION INTRAVENOUS at 07:39

## 2025-06-19 RX ADMIN — LIDOCAINE HYDROCHLORIDE 50 MG: 10 INJECTION, SOLUTION EPIDURAL; INFILTRATION; INTRACAUDAL; PERINEURAL at 08:02

## 2025-06-19 NOTE — ANESTHESIA PREPROCEDURE EVALUATION
Anesthesia Evaluation     Patient summary reviewed and Nursing notes reviewed                Airway   Mallampati: II  TM distance: >3 FB  Neck ROM: full  No difficulty expected  Dental - normal exam     Pulmonary - negative pulmonary ROS and normal exam   Cardiovascular - normal exam    (+) hypertension, dysrhythmias (s/p PVA) Paroxysmal Atrial Fib, hyperlipidemia    ROS comment:   Echo 4/22: normal EF, no significant valve disease     LHC 6/16: angiographically normal coronaries     Neuro/Psych- negative ROS  GI/Hepatic/Renal/Endo    (+) renal disease- CRI    Musculoskeletal (-) negative ROS    Abdominal  - normal exam    Bowel sounds: normal.   Substance History - negative use     OB/GYN negative ob/gyn ROS         Other                      Anesthesia Plan    ASA 3     general     (propofol)  intravenous induction     Anesthetic plan, risks, benefits, and alternatives have been provided, discussed and informed consent has been obtained with: patient.    Plan discussed with CRNA.    CODE STATUS:

## 2025-06-19 NOTE — H&P
"   [facility]: Gastroenterology  HISTORY AND PHYSICAL    Patient Name: Mikie Melénedz  : 1975  MRN: [id number]  Primary Care Physician:  Roger Driver MD  Date of admission: 2025    Subjective   Subjective     Chief Complaint: positive cologuard    HPI:    Mikie Meléndez is a 50 y.o. male here with his wife (RN at [employer]) with positive cologuard  He has some occasional constipation,.  NO blood in stool NO pain  Patient active;  works with [employer] and self employed    Review of Systems   All systems were reviewed and negative except for: occasional constipation    Personal History     Past Medical History:   Diagnosis Date    Excessive caffeine intake     Hypertension     Kidney calculi     PAC (premature atrial contraction)     PAF (paroxysmal atrial fibrillation)     CHADS-VASc = 1    PVC (premature ventricular contraction)        Past Surgical History:   Procedure Laterality Date    APPENDECTOMY      BACK SURGERY      CARDIAC CATHETERIZATION  2016    \"NORMAL\" PER DR. KIMANI PHIPPS    CARDIAC ELECTROPHYSIOLOGY PROCEDURE N/A 2016    Procedure: PVA. Stop Flecainide 5 days prior to the procedure. Stop Metoprolol 3 days prior to the procedure.;  Surgeon: Kai Mitchell DO;  Location: [facility] EP INVASIVE LOCATION;  Service:     CARDIOVERSION  2016    CYSTOSCOPY W/ URETERAL STENT PLACEMENT Left 2019    Procedure: LEFT UTEROSCOPY, LEFT URETERAL BALLOON DIALATION, LEFT URETERAL STONE BASKET EXTRACTION, PLACEMENT OF LEFT URETERAL STENT 4.8;  Surgeon: Johnson White MD;  Location: [facility] OR;  Service: Urology    ENDOSCOPY  2019    EXTRACORPOREAL SHOCK WAVE LITHOTRIPSY (ESWL)      HERNIA REPAIR      LASIK      NASAL SEPTUM SURGERY         Family History: family history includes Arrhythmia in his sister; No Known Problems in his father, mother, and sister; Other in an other family member. Otherwise pertinent FHx was reviewed and not pertinent to current issue.    Social " History:  reports that he has never smoked. He has never used smokeless tobacco. He reports that he does not drink alcohol and does not use drugs.    Home Medications:  Risaquad-2, amLODIPine, aspirin, flecainide, losartan, metoprolol tartrate, sodium-potassium-magnesium sulfates, and vitamin B-12      Allergies:  No Known Allergies    Objective   Objective     Vitals:   Temp:  [96.5 °F (35.8 °C)] 96.5 °F (35.8 °C)  Heart Rate:  [66] 66  Resp:  [18] 18  BP: (135)/(96) 135/96   Wt Readings from Last 3 Encounters:   06/11/25 96.4 kg (212 lb 9.6 oz)   10/15/24 99.4 kg (219 lb 1.6 oz)   08/21/23 99.5 kg (219 lb 6.4 oz)    body mass index is unknown because there is no height or weight on file.  Physical Exam    Physical Exam  Constitutional:       Appearance: Normal appearance.   HENT:      Head:      Comments: glasses  Pulmonary:      Effort: Pulmonary effort is normal.      Breath sounds: Normal breath sounds.   Abdominal:      General: Abdomen is flat. Bowel sounds are normal.      Palpations: Abdomen is soft.   Skin:     General: Skin is warm and dry.   Neurological:      General: No focal deficit present.      Mental Status: He is alert and oriented to person, place, and time.   Psychiatric:         Mood and Affect: Mood normal.         Behavior: Behavior normal.           Result Review    Result Review:  I have personally reviewed the results from the time of this admission to 6/19/2025 07:58 EDT and agree with these findings:  [x]  Laboratory  []  Microbiology  []  Radiology  []  EKG/Telemetry   []  Cardiology/Vascular   []  Pathology  []  Old records  []  Other:  Most notable findings include: no anemia    Assessment & Plan   Assessment / Plan     Brief Patient Summary:  Mikie Meléndez is a 50 y.o. male who has positive cologuard    Active Hospital Problems:  Active Hospital Problems    Diagnosis     **Screening for colon cancer      Plan:       VTE Prophylaxis:  No VTE prophylaxis order currently  exists.        CODE STATUS:       Admission Status:  I believe this patient meets outpatient status.    Maite Wise MD

## 2025-06-19 NOTE — ANESTHESIA POSTPROCEDURE EVALUATION
Patient: Mikie Meléndez    Procedure Summary       Date: 06/19/25 Room / Location:  [facility] /  [facility]    Anesthesia Start: 0759 Anesthesia Stop: 0830    Procedure: COLONOSCOPY Diagnosis:       Screening for colon cancer      (Screening for colon cancer [Z12.11])    Surgeons: Maite Wise MD Provider: Juliano Fox MD    Anesthesia Type: general ASA Status: 3            Anesthesia Type: general    Vitals  Vitals Value Taken Time   /71 06/19/25 08:32   Temp 97.3 °F (36.3 °C) 06/19/25 08:32   Pulse 65 06/19/25 08:32   Resp 14 06/19/25 08:32   SpO2 96 % 06/19/25 08:32           Post Anesthesia Care and Evaluation    Patient location during evaluation: PACU  Patient participation: complete - patient participated  Level of consciousness: sleepy but conscious  Pain score: 0  Pain management: adequate    Airway patency: patent  Anesthetic complications: No anesthetic complications  PONV Status: none  Cardiovascular status: hemodynamically stable and acceptable  Respiratory status: nonlabored ventilation, acceptable and nasal cannula  Hydration status: acceptable

## 2025-06-20 ENCOUNTER — RESULTS FOLLOW-UP (OUTPATIENT)
Dept: GASTROENTEROLOGY | Facility: HOSPITAL | Age: 50
End: 2025-06-20
Payer: COMMERCIAL

## 2025-06-20 LAB
CYTO UR: NORMAL
LAB AP CASE REPORT: NORMAL
LAB AP CLINICAL INFORMATION: NORMAL
PATH REPORT.FINAL DX SPEC: NORMAL
PATH REPORT.GROSS SPEC: NORMAL

## 2025-06-20 NOTE — LETTER
"Mikie Meléndez  [address]  [place] [address]    June 23, 2025     Dear Mr. Meléndez:    Below are the results from your recent visit:    Resulted Orders   Tissue Pathology Exam   Result Value Ref Range    Case Report       Surgical Pathology Report                         Case: [id number]                                  Authorizing Provider:  Maite Wise MD           Collected:           06/19/2025 08:17 AM          Ordering Location:     [facility]   Received:            06/19/2025 08:37 AM                                 ENDO SUITES                                                                  Pathologist:           Mars Garay MD                                                            Specimen:    Large Intestine, Sigmoid Colon, sigmoid colon polyp for patho                              Clinical Information       Screening for colon cancer      Final Diagnosis       SIGMOID COLON, POLYPECTOMY:  Tubular adenoma.  Negative for high grade dysplasia or malignancy.  GJK      Gross Description       1. Large Intestine, Sigmoid Colon.  Received in formalin labeled \"sigmoid colon polyp for patho\" is a 0.2 x 0.2 x 0.1 cm single tan tissue fragment, submitted entirely in a single cassette.  LDP        Microscopic Description       The slides are reviewed and demonstrate histopathologic features supporting the above rendered diagnosis.           , Your pathology showed a low risk precancerous lesion that was removed.  I recommend repeat colonoscopy in 7 years and NOT 5 years.             Sincerely,        Maite Wise MD      "

## 2025-07-18 ENCOUNTER — APPOINTMENT (OUTPATIENT)
Dept: GENERAL RADIOLOGY | Facility: HOSPITAL | Age: 50
End: 2025-07-18
Payer: COMMERCIAL

## 2025-07-18 ENCOUNTER — HOSPITAL ENCOUNTER (EMERGENCY)
Facility: HOSPITAL | Age: 50
Discharge: HOME | End: 2025-07-18
Attending: EMERGENCY MEDICINE
Payer: COMMERCIAL

## 2025-07-18 VITALS
BODY MASS INDEX: 28.49 KG/M2 | OXYGEN SATURATION: 95 % | TEMPERATURE: 98.1 F | SYSTOLIC BLOOD PRESSURE: 120 MMHG | HEIGHT: 73 IN | DIASTOLIC BLOOD PRESSURE: 86 MMHG | HEART RATE: 63 BPM | WEIGHT: 215 LBS | RESPIRATION RATE: 18 BRPM

## 2025-07-18 DIAGNOSIS — R00.2 PALPITATIONS: Primary | ICD-10-CM

## 2025-07-18 DIAGNOSIS — Z86.79 HISTORY OF ATRIAL FIBRILLATION: ICD-10-CM

## 2025-07-18 LAB
ALBUMIN SERPL-MCNC: 4.1 G/DL (ref 3.5–5.2)
ALBUMIN/GLOB SERPL: 1.5 G/DL
ALP SERPL-CCNC: 74 U/L (ref 39–117)
ALT SERPL W P-5'-P-CCNC: 28 U/L (ref 1–41)
ANION GAP SERPL CALCULATED.3IONS-SCNC: 12 MMOL/L (ref 5–15)
AST SERPL-CCNC: 24 U/L (ref 1–40)
BASOPHILS # BLD AUTO: 0.05 10*3/MM3 (ref 0–0.2)
BASOPHILS NFR BLD AUTO: 0.7 % (ref 0–1.5)
BILIRUB SERPL-MCNC: 0.7 MG/DL (ref 0–1.2)
BUN SERPL-MCNC: 16.1 MG/DL (ref 6–20)
BUN/CREAT SERPL: 19.6 (ref 7–25)
CALCIUM SPEC-SCNC: 9 MG/DL (ref 8.6–10.5)
CHLORIDE SERPL-SCNC: 103 MMOL/L (ref 98–107)
CO2 SERPL-SCNC: 25 MMOL/L (ref 22–29)
CREAT SERPL-MCNC: 0.82 MG/DL (ref 0.76–1.27)
DEPRECATED RDW RBC AUTO: 39.8 FL (ref 37–54)
EGFRCR SERPLBLD CKD-EPI 2021: 106.4 ML/MIN/1.73
EOSINOPHIL # BLD AUTO: 0.21 10*3/MM3 (ref 0–0.4)
EOSINOPHIL NFR BLD AUTO: 2.9 % (ref 0.3–6.2)
ERYTHROCYTE [DISTWIDTH] IN BLOOD BY AUTOMATED COUNT: 11.8 % (ref 12.3–15.4)
GEN 5 1HR TROPONIN T REFLEX: 7 NG/L
GLOBULIN UR ELPH-MCNC: 2.8 GM/DL
GLUCOSE SERPL-MCNC: 109 MG/DL (ref 65–99)
HCT VFR BLD AUTO: 45.5 % (ref 37.5–51)
HGB BLD-MCNC: 16 G/DL (ref 13–17.7)
HOLD SPECIMEN: NORMAL
IMM GRANULOCYTES # BLD AUTO: 0.01 10*3/MM3 (ref 0–0.05)
IMM GRANULOCYTES NFR BLD AUTO: 0.1 % (ref 0–0.5)
LIPASE SERPL-CCNC: 27 U/L (ref 13–60)
LYMPHOCYTES # BLD AUTO: 3.28 10*3/MM3 (ref 0.7–3.1)
LYMPHOCYTES NFR BLD AUTO: 44.9 % (ref 19.6–45.3)
MAGNESIUM SERPL-MCNC: 2.1 MG/DL (ref 1.6–2.6)
MCH RBC QN AUTO: 32.3 PG (ref 26.6–33)
MCHC RBC AUTO-ENTMCNC: 35.2 G/DL (ref 31.5–35.7)
MCV RBC AUTO: 91.7 FL (ref 79–97)
MONOCYTES # BLD AUTO: 0.55 10*3/MM3 (ref 0.1–0.9)
MONOCYTES NFR BLD AUTO: 7.5 % (ref 5–12)
NEUTROPHILS NFR BLD AUTO: 3.21 10*3/MM3 (ref 1.7–7)
NEUTROPHILS NFR BLD AUTO: 43.9 % (ref 42.7–76)
NRBC BLD AUTO-RTO: 0 /100 WBC (ref 0–0.2)
NT-PROBNP SERPL-MCNC: 172.9 PG/ML (ref 0–900)
PLATELET # BLD AUTO: 206 10*3/MM3 (ref 140–450)
PMV BLD AUTO: 11.3 FL (ref 6–12)
POTASSIUM SERPL-SCNC: 4.1 MMOL/L (ref 3.5–5.2)
PROT SERPL-MCNC: 6.9 G/DL (ref 6–8.5)
QT INTERVAL: 404 MS
QT INTERVAL: 410 MS
QTC INTERVAL: 423 MS
QTC INTERVAL: 442 MS
RBC # BLD AUTO: 4.96 10*6/MM3 (ref 4.14–5.8)
SODIUM SERPL-SCNC: 140 MMOL/L (ref 136–145)
TROPONIN T NUMERIC DELTA: NORMAL
TROPONIN T SERPL HS-MCNC: <6 NG/L
TSH SERPL DL<=0.05 MIU/L-ACNC: 0.84 UIU/ML (ref 0.27–4.2)
WBC NRBC COR # BLD AUTO: 7.31 10*3/MM3 (ref 3.4–10.8)
WHOLE BLOOD HOLD COAG: NORMAL
WHOLE BLOOD HOLD SPECIMEN: NORMAL

## 2025-07-18 PROCEDURE — 99284 EMERGENCY DEPT VISIT MOD MDM: CPT

## 2025-07-18 PROCEDURE — 83880 ASSAY OF NATRIURETIC PEPTIDE: CPT | Performed by: EMERGENCY MEDICINE

## 2025-07-18 PROCEDURE — 83690 ASSAY OF LIPASE: CPT | Performed by: EMERGENCY MEDICINE

## 2025-07-18 PROCEDURE — 71045 X-RAY EXAM CHEST 1 VIEW: CPT

## 2025-07-18 PROCEDURE — 36415 COLL VENOUS BLD VENIPUNCTURE: CPT

## 2025-07-18 PROCEDURE — 80053 COMPREHEN METABOLIC PANEL: CPT | Performed by: EMERGENCY MEDICINE

## 2025-07-18 PROCEDURE — 84484 ASSAY OF TROPONIN QUANT: CPT | Performed by: EMERGENCY MEDICINE

## 2025-07-18 PROCEDURE — 84443 ASSAY THYROID STIM HORMONE: CPT | Performed by: EMERGENCY MEDICINE

## 2025-07-18 PROCEDURE — 93005 ELECTROCARDIOGRAM TRACING: CPT | Performed by: EMERGENCY MEDICINE

## 2025-07-18 PROCEDURE — 85025 COMPLETE CBC W/AUTO DIFF WBC: CPT | Performed by: EMERGENCY MEDICINE

## 2025-07-18 PROCEDURE — 83735 ASSAY OF MAGNESIUM: CPT | Performed by: EMERGENCY MEDICINE

## 2025-07-18 RX ORDER — SODIUM CHLORIDE 0.9 % (FLUSH) 0.9 %
10 SYRINGE (ML) INJECTION AS NEEDED
Status: DISCONTINUED | OUTPATIENT
Start: 2025-07-18 | End: 2025-07-18 | Stop reason: HOSPADM

## 2025-07-18 RX ORDER — ASPIRIN 81 MG/1
324 TABLET, CHEWABLE ORAL ONCE
Status: COMPLETED | OUTPATIENT
Start: 2025-07-18 | End: 2025-07-18

## 2025-07-18 RX ADMIN — ASPIRIN 243 MG: 81 TABLET, CHEWABLE ORAL at 19:01

## 2025-07-19 NOTE — ED PROVIDER NOTES
"Subjective   History of Present Illness  50-year-old male presents for evaluation of chest pain and palpitations.  Of note, prior to evaluating the patient I did a thorough review of his records.  I reviewed his most recent cath report from 2016 at which time he was noted to have normal-appearing coronary arteries.  He presents today complaining of palpitations.  Of note, the patient has a history of atrial fibrillation and is status post ablation.  He is followed by Dr. Campo with electrophysiology.  Over the past 6 days or so he has been experiencing intermittent episodes of palpitations.  He is unsure as to what might be triggering the episodes.  No recent changes to his medication or diet.  The episodes seem to be increasing in frequency, prompting his visit to the emergency department.  He denies any accompanying chest pain but does tell me that he has been \"burping\" more than normal over the past few days as well.      Review of Systems   Cardiovascular:  Positive for palpitations.   All other systems reviewed and are negative.      Past Medical History:   Diagnosis Date    Excessive caffeine intake     Hypertension     Kidney calculi     PAC (premature atrial contraction)     PAF (paroxysmal atrial fibrillation)     CHADS-VASc = 1    PVC (premature ventricular contraction)        No Known Allergies    Past Surgical History:   Procedure Laterality Date    APPENDECTOMY      BACK SURGERY      CARDIAC CATHETERIZATION  06/2016    \"NORMAL\" PER DR. KIMANI PHIPPS    CARDIAC ELECTROPHYSIOLOGY PROCEDURE N/A 09/28/2016    Procedure: PVA. Stop Flecainide 5 days prior to the procedure. Stop Metoprolol 3 days prior to the procedure.;  Surgeon: Kai Mitchell DO;  Location:  [facility] EP INVASIVE LOCATION;  Service:     CARDIOVERSION  06/24/2016    COLONOSCOPY N/A 6/19/2025    Procedure: COLONOSCOPY;  Surgeon: Maite Wise MD;  Location:  [facility] ENDOSCOPY;  Service: Gastroenterology;  Laterality: N/A;    CYSTOSCOPY W/ URETERAL " STENT PLACEMENT Left 04/12/2019    Procedure: LEFT UTEROSCOPY, LEFT URETERAL BALLOON DIALATION, LEFT URETERAL STONE BASKET EXTRACTION, PLACEMENT OF LEFT URETERAL STENT 4.8;  Surgeon: Johnson White MD;  Location: [facility];  Service: Urology    ENDOSCOPY  2019    EXTRACORPOREAL SHOCK WAVE LITHOTRIPSY (ESWL)      HERNIA REPAIR      LASIK      NASAL SEPTUM SURGERY         Family History   Problem Relation Age of Onset    Other Other         tachycardia    No Known Problems Mother     No Known Problems Father     Arrhythmia Sister     No Known Problems Sister        Social History     Socioeconomic History    Marital status:    Tobacco Use    Smoking status: Never    Smokeless tobacco: Never   Vaping Use    Vaping status: Never Used   Substance and Sexual Activity    Alcohol use: No    Drug use: No    Sexual activity: Yes     Partners: Female     Birth control/protection: None           Objective   Physical Exam  Vitals and nursing note reviewed.   Constitutional:       General: He is not in acute distress.     Appearance: He is well-developed. He is not diaphoretic.      Comments: Well-appearing male in no acute distress   HENT:      Head: Normocephalic and atraumatic.   Neck:      Vascular: No JVD.   Cardiovascular:      Rate and Rhythm: Normal rate and regular rhythm.      Heart sounds: Normal heart sounds. No murmur heard.     No friction rub. No gallop.   Pulmonary:      Effort: Pulmonary effort is normal. No respiratory distress.      Breath sounds: Normal breath sounds. No wheezing or rales.   Abdominal:      General: Bowel sounds are normal. There is no distension.      Palpations: Abdomen is soft. There is no mass.      Tenderness: There is no abdominal tenderness. There is no guarding.   Musculoskeletal:         General: Normal range of motion.      Cervical back: Normal range of motion.      Right lower leg: No edema.      Left lower leg: No edema.   Skin:     General: Skin is warm and dry.       Coloration: Skin is not pale.      Findings: No erythema or rash.   Neurological:      Mental Status: He is alert and oriented to person, place, and time.   Psychiatric:         Mood and Affect: Mood normal.         Thought Content: Thought content normal.         Judgment: Judgment normal.         Procedures           ED Course  ED Course as of 07/18/25 2325 Fri Jul 18, 2025 1755 50-year-old male presents for evaluation of chest pain and palpitations.  Of note, prior to evaluating the patient I did a thorough review of his records.  I reviewed his most recent cath report from 2016 at which time he was noted to have normal-appearing coronary arteries. [DD]   1803 He presents today complaining of palpitations.  He notes that over the past 6 days or so he has been experiencing intermittent episodes of palpitations.  He has a prior history of atrial fibrillation and is status post ablation.  He is followed by Dr. Campo with electrophysiology.  He is unsure as to what might be triggering his episodes.  They seem to be increasing in frequency, prompting his visit to the emergency department.  On arrival, the patient is nontoxic-appearing.  Benign exam.  Vital signs are reassuring.  Initial EKG interpreted independently by me revealed sinus rhythm with occasional PVCs and a heart rate of 70 with normal UT and QT intervals and no EKG evidence of Brugada syndrome or hypertrophic cardiomyopathy noted.  Differential diagnosis is quite broad.  We will obtain labs and imaging, and we will reassess following initial interventions. [DD]   1827 I personally and independently viewed the patient's x-ray images myself, and I am in agreement with the radiologist's reading for final interpretation, particularly there is no pulmonary edema noted. [DD]   1922 Labs interpreted independently by me are bland/unrevealing. [DD]   1941 Upon reevaluation, the patient looks and feels well.  [DD]   1941 Repeat troponin/EKG  negative/unchanged.  Doubt ACS, PE, dissection, or emergent cardiothoracic process at this time based on exam, history, clinical presentation, gestalt, and objective findings in the ED.  The patient will follow up with the heart and valve clinic within the next 72 hours for further outpatient work-up and evaluation.  Agreeable with plan and given appropriate strict return precautions.     [DD]      ED Course User Index  [DD] Mikie Schwarz MD                                             Recent Results (from the past 24 hours)   ECG 12 Lead Chest Pain    Collection Time: 07/18/25  6:01 PM   Result Value Ref Range    QT Interval 410 ms    QTC Interval 442 ms   High Sensitivity Troponin T    Collection Time: 07/18/25  6:06 PM    Specimen: Blood   Result Value Ref Range    HS Troponin T <6 <22 ng/L   Comprehensive Metabolic Panel    Collection Time: 07/18/25  6:06 PM    Specimen: Blood   Result Value Ref Range    Glucose 109 (H) 65 - 99 mg/dL    BUN 16.1 6.0 - 20.0 mg/dL    Creatinine 0.82 0.76 - 1.27 mg/dL    Sodium 140 136 - 145 mmol/L    Potassium 4.1 3.5 - 5.2 mmol/L    Chloride 103 98 - 107 mmol/L    CO2 25.0 22.0 - 29.0 mmol/L    Calcium 9.0 8.6 - 10.5 mg/dL    Total Protein 6.9 6.0 - 8.5 g/dL    Albumin 4.1 3.5 - 5.2 g/dL    ALT (SGPT) 28 1 - 41 U/L    AST (SGOT) 24 1 - 40 U/L    Alkaline Phosphatase 74 39 - 117 U/L    Total Bilirubin 0.7 0.0 - 1.2 mg/dL    Globulin 2.8 gm/dL    A/G Ratio 1.5 g/dL    BUN/Creatinine Ratio 19.6 7.0 - 25.0    Anion Gap 12.0 5.0 - 15.0 mmol/L    eGFR 106.4 >60.0 mL/min/1.73   Lipase    Collection Time: 07/18/25  6:06 PM    Specimen: Blood   Result Value Ref Range    Lipase 27 13 - 60 U/L   BNP    Collection Time: 07/18/25  6:06 PM    Specimen: Blood   Result Value Ref Range    proBNP 172.9 0.0 - 900.0 pg/mL   Green Top (Gel)    Collection Time: 07/18/25  6:06 PM   Result Value Ref Range    Extra Tube Hold for add-ons.    Lavender Top    Collection Time: 07/18/25  6:06 PM    Result Value Ref Range    Extra Tube hold for add-on    Gold Top - SST    Collection Time: 07/18/25  6:06 PM   Result Value Ref Range    Extra Tube Hold for add-ons.    Gray Top    Collection Time: 07/18/25  6:06 PM   Result Value Ref Range    Extra Tube Hold for add-ons.    Light Blue Top    Collection Time: 07/18/25  6:06 PM   Result Value Ref Range    Extra Tube Hold for add-ons.    CBC Auto Differential    Collection Time: 07/18/25  6:06 PM    Specimen: Blood   Result Value Ref Range    WBC 7.31 3.40 - 10.80 10*3/mm3    RBC 4.96 4.14 - 5.80 10*6/mm3    Hemoglobin 16.0 13.0 - 17.7 g/dL    Hematocrit 45.5 37.5 - 51.0 %    MCV 91.7 79.0 - 97.0 fL    MCH 32.3 26.6 - 33.0 pg    MCHC 35.2 31.5 - 35.7 g/dL    RDW 11.8 (L) 12.3 - 15.4 %    RDW-SD 39.8 37.0 - 54.0 fl    MPV 11.3 6.0 - 12.0 fL    Platelets 206 140 - 450 10*3/mm3    Neutrophil % 43.9 42.7 - 76.0 %    Lymphocyte % 44.9 19.6 - 45.3 %    Monocyte % 7.5 5.0 - 12.0 %    Eosinophil % 2.9 0.3 - 6.2 %    Basophil % 0.7 0.0 - 1.5 %    Immature Grans % 0.1 0.0 - 0.5 %    Neutrophils, Absolute 3.21 1.70 - 7.00 10*3/mm3    Lymphocytes, Absolute 3.28 (H) 0.70 - 3.10 10*3/mm3    Monocytes, Absolute 0.55 0.10 - 0.90 10*3/mm3    Eosinophils, Absolute 0.21 0.00 - 0.40 10*3/mm3    Basophils, Absolute 0.05 0.00 - 0.20 10*3/mm3    Immature Grans, Absolute 0.01 0.00 - 0.05 10*3/mm3    nRBC 0.0 0.0 - 0.2 /100 WBC   Magnesium    Collection Time: 07/18/25  6:06 PM    Specimen: Blood   Result Value Ref Range    Magnesium 2.1 1.6 - 2.6 mg/dL   TSH Rfx On Abnormal To Free T4    Collection Time: 07/18/25  6:06 PM    Specimen: Blood   Result Value Ref Range    TSH 0.839 0.270 - 4.200 uIU/mL   High Sensitivity Troponin T 1Hr    Collection Time: 07/18/25  7:08 PM    Specimen: Blood   Result Value Ref Range    HS Troponin T 7 <22 ng/L    Troponin T Numeric Delta     ECG 12 Lead Chest Pain    Collection Time: 07/18/25  7:17 PM   Result Value Ref Range    QT Interval 404 ms    QTC  Interval 423 ms     Note: In addition to lab results from this visit, the labs listed above may include labs taken at another facility or during a different encounter within the last 24 hours. Please correlate lab times with ED admission and discharge times for further clarification of the services performed during this visit.    XR Chest 1 View   Final Result   Impression:   No acute cardiopulmonary abnormality.            Electronically Signed: Viktor Luna MD     7/18/2025 6:13 PM EDT     Workstation ID: [id number]        Vitals:    07/18/25 1900 07/18/25 1903 07/18/25 1930 07/18/25 2000   BP: 120/86      BP Location:       Patient Position:       Pulse: 65 65 66 63   Resp:       Temp:       TempSrc:       SpO2: 98%  97% 95%   Weight:       Height:         Medications   aspirin chewable tablet 324 mg (243 mg Oral Given 7/18/25 1901)     ECG/EMG Results (last 24 hours)       Procedure Component Value Units Date/Time    Telemetry Scan [[id number]] Resulted: 07/18/25 1903     Updated: 07/18/25 1919          ECG 12 Lead Chest Pain   Final Result   Test Reason : Chest Pain   Blood Pressure :   */*   mmHG   Vent. Rate :  66 BPM     Atrial Rate :  66 BPM      P-R Int : 206 ms          QRS Dur :  90 ms       QT Int : 404 ms       P-R-T Axes :  38 -21 -13 degrees     QTcB Int : 423 ms      Normal sinus rhythm   Normal ECG   When compared with ECG of 18-Jul-2025 18:01, (Unconfirmed)   premature ventricular complexes are no longer present   Confirmed by MD Schwarz Michael ([id number]) on 7/18/2025 11:19:36 PM      Referred By: EDMD           Confirmed By: Jamal Schwarz MD      Telemetry Scan   Final Result      ECG 12 Lead Chest Pain   Final Result   Test Reason : Chest Pain   Blood Pressure :   */*   mmHG   Vent. Rate :  70 BPM     Atrial Rate :  70 BPM      P-R Int : 196 ms          QRS Dur : 108 ms       QT Int : 410 ms       P-R-T Axes :  33 -21  -4 degrees     QTcB Int : 442 ms      Sinus rhythm with occasional premature  ventricular complexes   Otherwise normal ECG   When compared with ECG of 08-Apr-2022 14:03,   premature ventricular complexes are now present   Confirmed by MD Schwarz Michael ([id number]) on 7/18/2025 11:19:17 PM      Referred By: EDMD           Confirmed By: Jamal Schwarz MD                    Medical Decision Making  Problems Addressed:  History of atrial fibrillation: complicated acute illness or injury  Palpitations: complicated acute illness or injury    Amount and/or Complexity of Data Reviewed  Labs: ordered.  Radiology: ordered.  ECG/medicine tests: ordered.    Risk  OTC drugs.        Final diagnoses:   Palpitations   History of atrial fibrillation       ED Disposition  ED Disposition       ED Disposition   Discharge    Condition   Stable    Comment   --               [facility] CARDIOLOGY  [address]  [address]  [place] [address] [phone]  In 3 days           Medication List      No changes were made to your prescriptions during this visit.            Mikie Schwarz MD  07/18/25 0338

## 2025-07-22 ENCOUNTER — OFFICE VISIT (OUTPATIENT)
Dept: CARDIOLOGY | Facility: HOSPITAL | Age: 50
End: 2025-07-22
Payer: COMMERCIAL

## 2025-07-22 ENCOUNTER — HOSPITAL ENCOUNTER (OUTPATIENT)
Dept: CARDIOLOGY | Facility: HOSPITAL | Age: 50
Discharge: HOME | End: 2025-07-22
Payer: COMMERCIAL

## 2025-07-22 VITALS
RESPIRATION RATE: 18 BRPM | BODY MASS INDEX: 28.12 KG/M2 | HEIGHT: 73 IN | HEART RATE: 69 BPM | WEIGHT: 212.2 LBS | OXYGEN SATURATION: 97 % | SYSTOLIC BLOOD PRESSURE: 118 MMHG | DIASTOLIC BLOOD PRESSURE: 86 MMHG

## 2025-07-22 DIAGNOSIS — I49.3 PVC'S (PREMATURE VENTRICULAR CONTRACTIONS): Primary | ICD-10-CM

## 2025-07-22 DIAGNOSIS — I48.0 PAROXYSMAL ATRIAL FIBRILLATION: ICD-10-CM

## 2025-07-22 DIAGNOSIS — R00.2 PALPITATIONS: ICD-10-CM

## 2025-07-22 DIAGNOSIS — I10 PRIMARY HYPERTENSION: ICD-10-CM

## 2025-07-22 DIAGNOSIS — I49.3 PVC'S (PREMATURE VENTRICULAR CONTRACTIONS): ICD-10-CM

## 2025-07-22 PROCEDURE — 93246 EXT ECG>7D<15D RECORDING: CPT

## 2025-07-22 PROCEDURE — 99214 OFFICE O/P EST MOD 30 MIN: CPT | Performed by: NURSE PRACTITIONER

## 2025-07-22 RX ORDER — ASPIRIN 81 MG/1
81 TABLET ORAL DAILY
COMMUNITY

## 2025-07-22 RX ORDER — SUCRALFATE 1 G/1
1 TABLET ORAL 4 TIMES DAILY
COMMUNITY
Start: 2025-07-16

## 2025-07-22 NOTE — PROGRESS NOTES
[facility] Heart Monitor Documentation    Mikie Meléndez  1975  [id number]  07/22/25      [] ZIO XT Patch  Model [id number] Prescribed for  Days    Serial Number: (N + 9 Digits) N   Apply-By Date on Box:   USPS Tracking Number:   USPS Tracking        [] [employer] MINI PLUS Mobile Cardiac Telemetry  Model BGMINIPLUS Prescribed for  Days    Serial Number: (BGM + 7 Digits) BGM  Shipped-By Date on Box:   UPS Tracking Number: [id number]  UPS Tracking      [] [employer] MINI Holter Monitor  Model BGMINIEL Prescribed for 14 Days    Serial Number: (7 Digits) [id number]  Shipped-By Date on Box: 07/08/2025  UPS Tracking Number: [id number]  UPS Tracking      This is a home hook up.    Neeta Lam, OPAL, 10:49 EDT, 07/22/25                  [facility]

## 2025-07-22 NOTE — PATIENT INSTRUCTIONS
- Heart monitor for 2 weeks     - Office will schedule testing  - Office will call or send message with testing results    - Cardiology department will call for appointment   The exact cause your pain is unclear.  May be related to not eating regularly.  So please regular meals.  To put you on Prilosec to take 1 daily on an empty stomach.  Avoid excessive caffeine.  If your symptoms persist the next step would be either an endoscopy to look into your stomach or a HIDA scan to look at your gallbladder and see how it functions.

## 2025-07-22 NOTE — PROGRESS NOTES
Chief Complaint  Palpitations    Subjective      History of Present Illness {  Problem List  Visit  Diagnosis   Encounters  Notes  Medications  Labs  Result Review Imaging  Media :23}     Mikie Meléndez, 50 y.o. male presents to [facility]ia [facility] for Palpitations.    Cardiac PMH:  1. Paroxysmal Atrial Fibrillation   History of previous stress testing and Holter monitoring, data insufficient.   Sinus rhythm by EKG.   Holter monitor showing sinus rhythm. Rare PVCs. One short run of atrial tachycardia. No AV block or pauses.  Echocardiogram with a preliminary report, 12/29/2014. EF 55% to 60%. Left atrium at 3.7 cm.  Dec 31 st diagnosis of Afib with RVR. ECV at  on jan 1st with initiation of Xarelto; stopped and changed to Eliquis   Echocardiogram 5/19/2016: EF 40-45%. Mild MR. Mild physiologic TR present. ? Tachy induced CMP  Cardiac catheterization 6/2/2016: EF 65%. Normal left ventricular function. normal coronary arteries.  Echocardiogram 6/24/2016: EF 50%. No thrombus or mass detected in left atrium or left atrial appendage. Mild left atrial enlargement. Trace intermittent MR.  Event Monitor 5/24/2016-6/23/2016 showing runs of PVCs and PACs (half time PAC and other PVCs). No Afib. At times symptoms of palpitations, SOB correlated with ectopy and at other times NSR with no abnormalities.   Cardioversion in January and June 2016. Started on Flec 50 mg BID, Toprol and Eliquis  S/p PVA 9/28/16, SD  Event monitor from 3/7-4/5/18 demonstrated mostly NSR with PVCs and a couple short runs of PSVT both of which were associated with skipped beats, racing heart and dizziness, subsequently re-started on Flec.   HOLTER, 48 hours 1/2025, No Afib, Rare Atrial tachycardia, NSVT, Occasional PVC's, PACS 1%. Longest AT 6.5 seconds.   Dyslipidemia.   Excessive caffeine intake.   Family history of tachycardia  Hypertension, amlodipine therapy, losartan    HPI:  Patient  "was recently evaluated at [facility] emergency department for complaints of palpitations.  Emergency department work-up revealed normal troponin/electrolytes/BNP/TSH, CXR with no acute cardiopulmonary findings, and ECGs with normal sinus rhythm and stable QRS on flecainide. Prior cardiac monitor January 2025 with analysis time approximately 3 days.  No atrial fibrillation on cardiac monitor.  Short duration NSVT of 8 beats.  PAC/PVC burden less than 1%.  Average heart rate 85 bpm.Patient was instructed to follow-up at [facility] heart and valve center for further evaluation. Primary cardiology Will ROBERT Wheeler.     Patient presents today with increased palpitations for the past week, and also an episode approximately 2 months ago while in [place]. No recent illness or medication change to correlate with this change in symptoms. Reports symptoms improved today. Describes palpitations as a skipped heartbeat; not consistent with previous atrial fibrillation symptoms. He is quite active with walking and symptoms improve with activity; no exertional chest pain.  He did experience an episode of shortness of breath with worsened palpitations prior to ED evaluation.  Increased belching with palpitations; initiated on omeprazole and carafate by PCP last week. 1 caffeinated beverage daily, no alcohol. Prior diagnosis of MITCHELL and previous use of oral appliance. SBP generally 130-140s and heart rates 60-70s on home monitoring.       Objective     Vital Signs:   Vitals:    07/22/25 0938 07/22/25 0939   BP: 116/84 118/86   BP Location: Left arm Left arm   Patient Position: Sitting Sitting   Cuff Size: Adult Adult   Pulse: 67 69   Resp: 18    SpO2: 97% 97%   Weight: 96.3 kg (212 lb 3.2 oz)    Height: 185.4 cm (73\")      Body mass index is 28 kg/m².  Physical Exam  Vitals and nursing note reviewed.   Constitutional:       Appearance: Normal appearance.   HENT:      Head: Normocephalic.   Eyes:      Extraocular " Movements: Extraocular movements intact.   Neck:      Vascular: No carotid bruit.   Cardiovascular:      Rate and Rhythm: Normal rate and regular rhythm.      Pulses: Normal pulses.      Heart sounds: Normal heart sounds, S1 normal and S2 normal. No murmur heard.  Pulmonary:      Effort: Pulmonary effort is normal. No respiratory distress.      Breath sounds: Normal breath sounds.   Musculoskeletal:      Cervical back: Neck supple.      Right lower leg: No edema.      Left lower leg: No edema.   Skin:     General: Skin is warm and dry.   Neurological:      General: No focal deficit present.      Mental Status: He is alert.   Psychiatric:         Mood and Affect: Mood normal.         Behavior: Behavior normal.         Thought Content: Thought content normal.        Data Reviewed:{ Labs  Result Review  Imaging  Med Tab  Media :23}     High Sensitivity Troponin T 1Hr (07/18/2025 19:08)  TSH Rfx On Abnormal To Free T4 (07/18/2025 18:06)  Magnesium (07/18/2025 18:06)  BNP (07/18/2025 18:06)  Comprehensive Metabolic Panel (07/18/2025 18:06)  CBC & Differential (07/18/2025 18:06)  High Sensitivity Troponin T (07/18/2025 18:06)  XR Chest 1 View (07/18/2025 18:10)   ECG 12 Lead Chest Pain (07/18/2025 19:17)  ECG 12 Lead Chest Pain (07/18/2025 18:01)      Lab Results   Component Value Date    GLUCOSE 109 (H) 07/18/2025    CALCIUM 9.0 07/18/2025     07/18/2025    K 4.1 07/18/2025    CO2 25.0 07/18/2025     07/18/2025    BUN 16.1 07/18/2025    CREATININE 0.82 07/18/2025    EGFR 106.4 07/18/2025    BCR 19.6 07/18/2025    ANIONGAP 12.0 07/18/2025     Lab Results   Component Value Date    TSH 0.839 07/18/2025     Lab Results   Component Value Date    WBC 7.31 07/18/2025    HGB 16.0 07/18/2025    HCT 45.5 07/18/2025    MCV 91.7 07/18/2025     07/18/2025         Assessment & Plan   Assessment and Plan {CC Problem List  Visit Diagnosis  ROS  Review (Popup)  Health Maintenance  Quality  BestPractice   Medications  SmartSets  SnapShot Encounters  Media :23}     1. PVC's (premature ventricular contractions)  -Recently evaluated at [facility] emergency department for complaints of palpitations.  Reports symptoms are somewhat improved today.  -Emergency department work-up revealed normal troponin/electrolytes/BNP/TSH, CXR with no acute cardiopulmonary findings, and ECGs with normal sinus rhythm and stable QRS on flecainide.   -Prior cardiac monitor January 2025 with analysis time approximately 3 days.  No atrial fibrillation on cardiac monitor.  Short duration NSVT of 8 beats.  PAC/PVC burden less than 1%.  Average heart rate 85 bpm.Patient was instructed to follow-up at [facility] heart and valve center for further evaluation.  - Recent palpitations likely symptomatic PVCs given symptom description  - Holter Monitor - 14 Days; Future  - Adult Transthoracic Echo Complete W/ Cont if Necessary Per Protocol; Future  - Discussed potential medication titration, he would prefer to complete cardiac monitor with metoprolol at current dose to evaluate rhythm.  Discussed he could trial additional dose of metoprolol as needed for worsened symptoms in interim.  -Continue metoprolol tartrate 25 Mg/flecainide 75 Mg every 12 hours as prescribed  - Will message scheduling to help facilitate follow-up with Red OLIVER for review of cardiac monitor results and reevaluation.    2. Paroxysmal atrial fibrillation  -Prior pulmonary vein ablation September 2016 with no known recurrence since ablation  -Denies recurrent atrial fibrillation symptoms.  Reports recent palpitations not consistent with prior AF  -Holter monitor as above  -Adult Transthoracic Echo Complete W/ Cont if Necessary Per Protocol; Future  -Continue routine follow-up with EP as above    3. Primary hypertension  -Currently well-controlled  -Continue losartan 50 Mg twice daily, amlodipine 10 Mg daily as prescribed  -Continue home BP  monitoring      Follow Up {Instructions Charge Capture  Follow-up Communications :23}     Return if symptoms worsen or fail to improve.      Patient was given instructions and counseling regarding his condition or for health maintenance advice. Please see specific information pulled into the AVS if appropriate. Patient was instructed to call the [facility] with any questions, concerns, or worsening symptoms.    Dictated Utilizing Dragon Dictation   Please note that portions of this note were completed with a voice recognition program. Part of this note may be an electronic transcription/translation of spoken language to printed text using the Dragon Dictation System.

## 2025-08-01 ENCOUNTER — HOSPITAL ENCOUNTER (OUTPATIENT)
Dept: CARDIOLOGY | Facility: HOSPITAL | Age: 50
Discharge: HOME | End: 2025-08-01
Admitting: NURSE PRACTITIONER
Payer: COMMERCIAL

## 2025-08-01 VITALS — HEIGHT: 73 IN | WEIGHT: 211.64 LBS | BODY MASS INDEX: 28.05 KG/M2

## 2025-08-01 DIAGNOSIS — R00.2 PALPITATIONS: ICD-10-CM

## 2025-08-01 DIAGNOSIS — I48.0 PAROXYSMAL ATRIAL FIBRILLATION: ICD-10-CM

## 2025-08-01 DIAGNOSIS — I49.3 PVC'S (PREMATURE VENTRICULAR CONTRACTIONS): ICD-10-CM

## 2025-08-01 LAB
AORTIC DIMENSIONLESS INDEX: 0.8 (DI)
ASCENDING AORTA: 3.5 CM
AV MEAN PRESS GRAD SYS DOP V1V2: 3 MMHG
AV VMAX SYS DOP: 108 CM/SEC
BH CV ECHO MEAS - AO MAX PG: 4.7 MMHG
BH CV ECHO MEAS - AO ROOT DIAM: 3.6 CM
BH CV ECHO MEAS - AO V2 VTI: 22.3 CM
BH CV ECHO MEAS - AVA(I,D): 2.8 CM2
BH CV ECHO MEAS - EDV(CUBED): 175.6 ML
BH CV ECHO MEAS - EDV(MOD-SP2): 99.6 ML
BH CV ECHO MEAS - EDV(MOD-SP4): 103 ML
BH CV ECHO MEAS - EF(MOD-SP2): 60.4 %
BH CV ECHO MEAS - EF(MOD-SP4): 56.8 %
BH CV ECHO MEAS - ESV(CUBED): 59.3 ML
BH CV ECHO MEAS - ESV(MOD-SP2): 39.4 ML
BH CV ECHO MEAS - ESV(MOD-SP4): 44.5 ML
BH CV ECHO MEAS - FS: 30.4 %
BH CV ECHO MEAS - IVS/LVPW: 1 CM
BH CV ECHO MEAS - IVSD: 1.1 CM
BH CV ECHO MEAS - LA DIMENSION: 4.3 CM
BH CV ECHO MEAS - LAT PEAK E' VEL: 11.9 CM/SEC
BH CV ECHO MEAS - LV DIASTOLIC VOL/BSA (35-75): 46.4 CM2
BH CV ECHO MEAS - LV MASS(C)D: 249.3 GRAMS
BH CV ECHO MEAS - LV MAX PG: 2.7 MMHG
BH CV ECHO MEAS - LV MEAN PG: 1.67 MMHG
BH CV ECHO MEAS - LV SYSTOLIC VOL/BSA (12-30): 20.1 CM2
BH CV ECHO MEAS - LV V1 MAX: 82.8 CM/SEC
BH CV ECHO MEAS - LV V1 VTI: 17.8 CM
BH CV ECHO MEAS - LVIDD: 5.6 CM
BH CV ECHO MEAS - LVIDS: 3.9 CM
BH CV ECHO MEAS - LVOT AREA: 3.5 CM2
BH CV ECHO MEAS - LVOT DIAM: 2.1 CM
BH CV ECHO MEAS - LVPWD: 1.1 CM
BH CV ECHO MEAS - MED PEAK E' VEL: 5.7 CM/SEC
BH CV ECHO MEAS - MV A MAX VEL: 58.7 CM/SEC
BH CV ECHO MEAS - MV DEC SLOPE: 411 CM/SEC2
BH CV ECHO MEAS - MV DEC TIME: 0.25 SEC
BH CV ECHO MEAS - MV E MAX VEL: 83.3 CM/SEC
BH CV ECHO MEAS - MV E/A: 1.42
BH CV ECHO MEAS - MV MAX PG: 3.6 MMHG
BH CV ECHO MEAS - MV MEAN PG: 1 MMHG
BH CV ECHO MEAS - MV P1/2T: 68.6 MSEC
BH CV ECHO MEAS - MV V2 VTI: 22.8 CM
BH CV ECHO MEAS - MVA(P1/2T): 3.2 CM2
BH CV ECHO MEAS - MVA(VTI): 2.7 CM2
BH CV ECHO MEAS - PA ACC TIME: 0.15 SEC
BH CV ECHO MEAS - SV(LVOT): 61.8 ML
BH CV ECHO MEAS - SV(MOD-SP2): 60.2 ML
BH CV ECHO MEAS - SV(MOD-SP4): 58.5 ML
BH CV ECHO MEAS - SVI(LVOT): 27.8 ML/M2
BH CV ECHO MEAS - SVI(MOD-SP2): 27.1 ML/M2
BH CV ECHO MEAS - SVI(MOD-SP4): 26.4 ML/M2
BH CV ECHO MEAS - TAPSE (>1.6): 2.35 CM
BH CV ECHO MEAS - TR MAX PG: 11 MMHG
BH CV ECHO MEAS - TR MAX VEL: 166 CM/SEC
BH CV ECHO MEASUREMENTS AVERAGE E/E' RATIO: 9.47
BH CV VAS BP RIGHT ARM: NORMAL MMHG
BH CV XLRA - RV BASE: 2.7 CM
BH CV XLRA - RV MID: 3.5 CM
BH CV XLRA - TDI S': 13.1 CM/SEC
IVRT: 102 MS
LEFT ATRIUM VOLUME INDEX: 28.9 ML/M2
LV EF BIPLANE MOD: 58.9 %

## 2025-08-01 PROCEDURE — 93306 TTE W/DOPPLER COMPLETE: CPT

## (undated) DEVICE — GLW STD FLX STR 8CM .035IN 150CM

## (undated) DEVICE — ENDOGATOR HYBRID TUBING KIT FOR USE WITH ENDOGATOR IRRIGATION PUMP, OLYMPUS PUMP, GI4000 ESU, AND TORRENT IRRIGATION PUMP.: Brand: ENDOGATOR KIT

## (undated) DEVICE — SOLIDIFIER LIQ PREMISORB 1500CC

## (undated) DEVICE — KT ORCA ORCAPOD DISP STRL

## (undated) DEVICE — HYBRID CO2 TUBING/CAP SET FOR OLYMPUS® SCOPES & CO2 SOURCE: Brand: ERBE

## (undated) DEVICE — TUBING, SUCTION, 1/4" X 10', STRAIGHT: Brand: MEDLINE

## (undated) DEVICE — INTRO ACCSR BLNT TP

## (undated) DEVICE — PK CYSTO-TUR BASIC 10

## (undated) DEVICE — AIR/WATER CLEANING VALVES: Brand: AIR/WATER CLEANING VALVES

## (undated) DEVICE — LUBE JELLY FOIL PACK 1.4 OZ: Brand: MEDLINE INDUSTRIES, INC.

## (undated) DEVICE — GLV SURG SENSICARE MICRO PF LF 8.5 STRL

## (undated) DEVICE — GLV SURG SENSICARE MICRO PF LF 8 STRL

## (undated) DEVICE — FIRST STEP BEDSIDE ADD WATER KIT - RESEALABLE STAND-UP POUCH, ENDOSCOPIC CLEANING PAD - 1 POUCH: Brand: FIRST STEP BEDSIDE ADD WATER KIT - RESEALABLE STAND-UP POUCH, ENDOSCOPIC CLEANIN

## (undated) DEVICE — SAFELINER SUCTION CANISTER 1000CC: Brand: DEROYAL

## (undated) DEVICE — SNAR POLYP CAPTIVATOR RND STFF 2.4 240CM 10MM 1P/U

## (undated) DEVICE — NITINOL STONE RETRIEVAL BASKET: Brand: ZERO TIP

## (undated) DEVICE — GLV SURG SENSICARE MICRO PF LF 6.5 STRL

## (undated) DEVICE — GLV SURG SIGNATURE TOUCH PF LTX 7 STRL

## (undated) DEVICE — DRSNG SURESITE WNDW 2.38X2.75

## (undated) DEVICE — SOL IRR H2O BO 1000ML STRL

## (undated) DEVICE — NITINOL WIRE WITH HYDROPHILIC TIP: Brand: SENSOR

## (undated) DEVICE — INFLATION DEVICE: Brand: ENCORE 26

## (undated) DEVICE — BALLOON DILATATION CATHETER: Brand: UROMAX ULTRA

## (undated) DEVICE — SYR LUERLOK 50ML

## (undated) DEVICE — CONTN GRAD MEAS TRIANG 32OZ BLK